# Patient Record
Sex: MALE | Race: WHITE | NOT HISPANIC OR LATINO | Employment: OTHER | URBAN - NONMETROPOLITAN AREA
[De-identification: names, ages, dates, MRNs, and addresses within clinical notes are randomized per-mention and may not be internally consistent; named-entity substitution may affect disease eponyms.]

---

## 2017-11-09 ENCOUNTER — OFFICE VISIT (OUTPATIENT)
Dept: FAMILY MEDICINE | Facility: OTHER | Age: 62
End: 2017-11-09
Attending: FAMILY MEDICINE
Payer: COMMERCIAL

## 2017-11-09 VITALS
TEMPERATURE: 98 F | OXYGEN SATURATION: 98 % | WEIGHT: 196 LBS | HEIGHT: 66 IN | DIASTOLIC BLOOD PRESSURE: 70 MMHG | SYSTOLIC BLOOD PRESSURE: 128 MMHG | HEART RATE: 75 BPM | BODY MASS INDEX: 31.5 KG/M2

## 2017-11-09 DIAGNOSIS — I10 ESSENTIAL HYPERTENSION: ICD-10-CM

## 2017-11-09 DIAGNOSIS — E11.9 CONTROLLED TYPE 2 DIABETES MELLITUS WITHOUT COMPLICATION, WITHOUT LONG-TERM CURRENT USE OF INSULIN (H): Primary | ICD-10-CM

## 2017-11-09 DIAGNOSIS — E78.00 PURE HYPERCHOLESTEROLEMIA: ICD-10-CM

## 2017-11-09 PROBLEM — I05.9 MITRAL VALVE DISORDER: Status: ACTIVE | Noted: 2017-11-09

## 2017-11-09 LAB
ALBUMIN SERPL-MCNC: 3.9 G/DL (ref 3.4–5)
ALP SERPL-CCNC: 55 U/L (ref 40–150)
ALT SERPL W P-5'-P-CCNC: 34 U/L (ref 0–70)
ANION GAP SERPL CALCULATED.3IONS-SCNC: 8 MMOL/L (ref 3–14)
AST SERPL W P-5'-P-CCNC: 22 U/L (ref 0–45)
BASOPHILS # BLD AUTO: 0 10E9/L (ref 0–0.2)
BASOPHILS NFR BLD AUTO: 0.4 %
BILIRUB SERPL-MCNC: 0.8 MG/DL (ref 0.2–1.3)
BUN SERPL-MCNC: 14 MG/DL (ref 7–30)
CALCIUM SERPL-MCNC: 9.2 MG/DL (ref 8.5–10.1)
CHLORIDE SERPL-SCNC: 101 MMOL/L (ref 94–109)
CHOLEST SERPL-MCNC: 179 MG/DL
CO2 SERPL-SCNC: 28 MMOL/L (ref 20–32)
CREAT SERPL-MCNC: 0.96 MG/DL (ref 0.66–1.25)
CREAT UR-MCNC: 157 MG/DL
DIFFERENTIAL METHOD BLD: NORMAL
EOSINOPHIL # BLD AUTO: 0.1 10E9/L (ref 0–0.7)
EOSINOPHIL NFR BLD AUTO: 0.9 %
ERYTHROCYTE [DISTWIDTH] IN BLOOD BY AUTOMATED COUNT: 12.3 % (ref 10–15)
EST. AVERAGE GLUCOSE BLD GHB EST-MCNC: 157 MG/DL
GFR SERPL CREATININE-BSD FRML MDRD: 79 ML/MIN/1.7M2
GLUCOSE SERPL-MCNC: 138 MG/DL (ref 70–99)
HBA1C MFR BLD: 7.1 % (ref 4.3–6)
HCT VFR BLD AUTO: 41.3 % (ref 40–53)
HDLC SERPL-MCNC: 53 MG/DL
HGB BLD-MCNC: 14.5 G/DL (ref 13.3–17.7)
IMM GRANULOCYTES # BLD: 0 10E9/L (ref 0–0.4)
IMM GRANULOCYTES NFR BLD: 0.2 %
LDLC SERPL CALC-MCNC: 85 MG/DL
LYMPHOCYTES # BLD AUTO: 1.4 10E9/L (ref 0.8–5.3)
LYMPHOCYTES NFR BLD AUTO: 16.7 %
MCH RBC QN AUTO: 32.1 PG (ref 26.5–33)
MCHC RBC AUTO-ENTMCNC: 35.1 G/DL (ref 31.5–36.5)
MCV RBC AUTO: 91 FL (ref 78–100)
MICROALBUMIN UR-MCNC: 15 MG/L
MICROALBUMIN/CREAT UR: 9.75 MG/G CR (ref 0–17)
MONOCYTES # BLD AUTO: 0.7 10E9/L (ref 0–1.3)
MONOCYTES NFR BLD AUTO: 8.9 %
NEUTROPHILS # BLD AUTO: 5.9 10E9/L (ref 1.6–8.3)
NEUTROPHILS NFR BLD AUTO: 72.9 %
NONHDLC SERPL-MCNC: 126 MG/DL
NRBC # BLD AUTO: 0 10*3/UL
NRBC BLD AUTO-RTO: 0 /100
PLATELET # BLD AUTO: 153 10E9/L (ref 150–450)
POTASSIUM SERPL-SCNC: 4.2 MMOL/L (ref 3.4–5.3)
PROT SERPL-MCNC: 7.3 G/DL (ref 6.8–8.8)
RBC # BLD AUTO: 4.52 10E12/L (ref 4.4–5.9)
SODIUM SERPL-SCNC: 137 MMOL/L (ref 133–144)
TRIGL SERPL-MCNC: 203 MG/DL
WBC # BLD AUTO: 8.1 10E9/L (ref 4–11)

## 2017-11-09 PROCEDURE — 99203 OFFICE O/P NEW LOW 30 MIN: CPT | Performed by: FAMILY MEDICINE

## 2017-11-09 PROCEDURE — 83036 HEMOGLOBIN GLYCOSYLATED A1C: CPT | Performed by: FAMILY MEDICINE

## 2017-11-09 PROCEDURE — 80053 COMPREHEN METABOLIC PANEL: CPT | Performed by: FAMILY MEDICINE

## 2017-11-09 PROCEDURE — 40000788 ZZHCL STATISTIC ESTIMATED AVERAGE GLUCOSE: Performed by: FAMILY MEDICINE

## 2017-11-09 PROCEDURE — 36415 COLL VENOUS BLD VENIPUNCTURE: CPT | Performed by: FAMILY MEDICINE

## 2017-11-09 PROCEDURE — 82043 UR ALBUMIN QUANTITATIVE: CPT | Performed by: FAMILY MEDICINE

## 2017-11-09 PROCEDURE — 85025 COMPLETE CBC W/AUTO DIFF WBC: CPT | Performed by: FAMILY MEDICINE

## 2017-11-09 PROCEDURE — 80061 LIPID PANEL: CPT | Performed by: FAMILY MEDICINE

## 2017-11-09 RX ORDER — SILDENAFIL 100 MG/1
100 TABLET, FILM COATED ORAL
COMMUNITY
Start: 2016-10-12 | End: 2021-06-01

## 2017-11-09 RX ORDER — LOVASTATIN 10 MG
20 TABLET ORAL AT BEDTIME
Qty: 90 TABLET | Refills: 3 | Status: SHIPPED | OUTPATIENT
Start: 2017-11-09 | End: 2019-09-12

## 2017-11-09 RX ORDER — LISINOPRIL 10 MG/1
10 TABLET ORAL DAILY
Qty: 90 TABLET | Refills: 3 | Status: SHIPPED | OUTPATIENT
Start: 2017-11-09 | End: 2018-12-02

## 2017-11-09 RX ORDER — GLUCOSAMINE HCL/CHONDROITIN SU 500-400 MG
CAPSULE ORAL
COMMUNITY
Start: 2013-05-06 | End: 2018-07-03

## 2017-11-09 ASSESSMENT — ANXIETY QUESTIONNAIRES
4. TROUBLE RELAXING: NOT AT ALL
7. FEELING AFRAID AS IF SOMETHING AWFUL MIGHT HAPPEN: NOT AT ALL
IF YOU CHECKED OFF ANY PROBLEMS ON THIS QUESTIONNAIRE, HOW DIFFICULT HAVE THESE PROBLEMS MADE IT FOR YOU TO DO YOUR WORK, TAKE CARE OF THINGS AT HOME, OR GET ALONG WITH OTHER PEOPLE: NOT DIFFICULT AT ALL
1. FEELING NERVOUS, ANXIOUS, OR ON EDGE: NOT AT ALL
GAD7 TOTAL SCORE: 0
6. BECOMING EASILY ANNOYED OR IRRITABLE: NOT AT ALL
2. NOT BEING ABLE TO STOP OR CONTROL WORRYING: NOT AT ALL
3. WORRYING TOO MUCH ABOUT DIFFERENT THINGS: NOT AT ALL
5. BEING SO RESTLESS THAT IT IS HARD TO SIT STILL: NOT AT ALL

## 2017-11-09 ASSESSMENT — PAIN SCALES - GENERAL: PAINLEVEL: NO PAIN (0)

## 2017-11-09 ASSESSMENT — PATIENT HEALTH QUESTIONNAIRE - PHQ9: SUM OF ALL RESPONSES TO PHQ QUESTIONS 1-9: 2

## 2017-11-09 NOTE — PROGRESS NOTES
[facility]    Dirk Terrell, 62 year old, male presents with   Chief Complaint   Patient presents with     Establish Care     Was over in [place] before-Here today to establish care.  Get a colonoscopy in 2016 had a polyp.  Was told to repeat in 5 years.  February of this year had an A1c of 7.2.  Needs his meds refilled today.  He declines a flu shot.  Denies any chest pain or shortness of breath.       PAST MEDICAL HISTORY:  Past Medical History:   Diagnosis Date     Controlled type 2 diabetes mellitus without complication, without long-term current use of insulin (H) 11/9/2017     Erectile dysfunction, unspecified erectile dysfunction type 10/12/2016     Essential hypertension 11/9/2017     Pure hypercholesterolemia 11/9/2017       PAST SURGICAL HISTORY:  Past Surgical History:   Procedure Laterality Date     COLONOSCOPY N/A 2016    one polyp was told to repeat in 5 years       MEDICATIONS:  Prior to Admission medications    Medication Sig Start Date End Date Taking? Authorizing Provider   SERTRALINE HCL PO Take 100 mg by mouth daily   Yes Reported, Patient   Glucose Blood (BLOOD GLUCOSE TEST STRIPS) STRP Check sugar 1-2 times daily 5/6/13  Yes Reported, Patient   blood glucose monitoring (SHAI MICROLET) lancets Check sugar 1-2 times daily 5/6/13  Yes Reported, Patient   sildenafil (VIAGRA) 100 MG tablet Take 100 mg by mouth 10/12/16  Yes Reported, Patient   lovastatin (MEVACOR) 10 MG tablet Take 2 tablets (20 mg) by mouth At Bedtime 11/9/17  Yes LANDY Garrett MD   lisinopril (PRINIVIL/ZESTRIL) 10 MG tablet Take 1 tablet (10 mg) by mouth daily 11/9/17  Yes LANDY Garrett MD   metFORMIN (GLUCOPHAGE) 1000 MG tablet 1000 mg twice a day 11/9/17  Yes LANDY Garrett MD       ALLERGIES:   No Known Allergies    ROS:  Constitutional, neuro, ENT, endocrine, pulmonary, cardiac, gastrointestinal, genitourinary, musculoskeletal, integument and psychiatric systems are negative, except as otherwise  "noted.    C: NEGATIVE for fever, chills, change in weight  I: NEGATIVE for worrisome rashes, moles or lesions  E: NEGATIVE for vision changes or irritation  E/M: NEGATIVE for ear, mouth and throat problems  R: NEGATIVE for significant cough or SOB  B: NEGATIVE for masses, tenderness or discharge  CV: NEGATIVE for chest pain, palpitations or peripheral edema  GI: NEGATIVE for nausea, abdominal pain, heartburn, or change in bowel habits  : NEGATIVE for frequency, dysuria, or hematuria  M: NEGATIVE for significant arthralgias or myalgia  N: NEGATIVE for weakness, dizziness or paresthesias  E: NEGATIVE for temperature intolerance, skin/hair changes  H: NEGATIVE for bleeding problems  P: NEGATIVE for changes in mood or affect    EXAM:/70 (BP Location: Left arm, Patient Position: Chair, Cuff Size: Adult Regular)  Pulse 75  Temp 98  F (36.7  C) (Tympanic)  Ht 5' 6\" (1.676 m)  Wt 196 lb (88.9 kg)  SpO2 98%  BMI 31.64 kg/m2 Body mass index is 31.64 kg/(m^2).   GENERAL APPEARANCE: healthy, alert and no distress  EYES: Eyes grossly normal to inspection, PERRL and conjunctivae and sclerae normal  HENT:and nose and mouth without ulcers or lesions  NECK: no adenopathy, no asymmetry, masses, or scars and thyroid normal to palpation  RESP: lungs clear to auscultation - no rales, rhonchi or wheezes  CV: regular rates and rhythm, normal S1 S2, no S3 or S4 and no murmur, click or rub  ABDOMEN: soft, nontender, without hepatosplenomegaly or masses and bowel sounds normal  MS: extremities normal- no gross deformities noted  SKIN: no suspicious lesions or rashes  DIABETIC FOOT EXAM: normal DP and PT pulses, no trophic changes or ulcerative lesions and normal sensory exam  PSYCH: mentation appears normal and affect normal/bright  Lab/ X-ray  Results for orders placed or performed in visit on 11/09/17 (from the past 24 hour(s))   CBC with platelets differential   Result Value Ref Range    WBC 8.1 4.0 - 11.0 10e9/L    RBC " Count 4.52 4.4 - 5.9 10e12/L    Hemoglobin 14.5 13.3 - 17.7 g/dL    Hematocrit 41.3 40.0 - 53.0 %    MCV 91 78 - 100 fl    MCH 32.1 26.5 - 33.0 pg    MCHC 35.1 31.5 - 36.5 g/dL    RDW 12.3 10.0 - 15.0 %    Platelet Count 153 150 - 450 10e9/L    Diff Method Automated Method     % Neutrophils 72.9 %    % Lymphocytes 16.7 %    % Monocytes 8.9 %    % Eosinophils 0.9 %    % Basophils 0.4 %    % Immature Granulocytes 0.2 %    Nucleated RBCs 0 0 /100    Absolute Neutrophil 5.9 1.6 - 8.3 10e9/L    Absolute Lymphocytes 1.4 0.8 - 5.3 10e9/L    Absolute Monocytes 0.7 0.0 - 1.3 10e9/L    Absolute Eosinophils 0.1 0.0 - 0.7 10e9/L    Absolute Basophils 0.0 0.0 - 0.2 10e9/L    Abs Immature Granulocytes 0.0 0 - 0.4 10e9/L    Absolute Nucleated RBC 0.0        ASSESSMENT/PLAN:    ICD-10-CM    1. Controlled type 2 diabetes mellitus without complication, without long-term current use of insulin (H) E11.9 CBC with platelets differential. Will call him with labs.  Is on a statin.  Is on an ACE inhibitor.  Is taking aspirin.  Does not smoke.  We'll work with diet and exercise.  Will call him with results. He also doesn't feel he needs a sertraline so will drop it down from 100 mg daily to 50 mg daily for a week and then 25 mg daily for a week and then discontinue.     Hemoglobin A1c     Albumin Random Urine Quantitative with Creat Ratio     Comprehensive metabolic panel     metFORMIN (GLUCOPHAGE) 1000 MG tablet   2. Pure hypercholesterolemia E78.00 Lipid Profile     lovastatin (MEVACOR) 10 MG tablet   3. Essential hypertension I10 lisinopril (PRINIVIL/ZESTRIL) 10 MG tablet         JOSEPH Garrett MD  November 9, 2017

## 2017-11-09 NOTE — NURSING NOTE
"Chief Complaint   Patient presents with     Establish Care     Was over in [place] before-Here today to establish care.         Initial /70 (BP Location: Left arm, Patient Position: Chair, Cuff Size: Adult Regular)  Pulse 75  Temp 98  F (36.7  C) (Tympanic)  Ht 5' 6\" (1.676 m)  Wt 196 lb (88.9 kg)  SpO2 98%  BMI 31.64 kg/m2 Estimated body mass index is 31.64 kg/(m^2) as calculated from the following:    Height as of this encounter: 5' 6\" (1.676 m).    Weight as of this encounter: 196 lb (88.9 kg).  Medication Reconciliation: complete   GALEN DWYER      "

## 2017-11-09 NOTE — MR AVS SNAPSHOT
"              After Visit Summary   11/9/2017    Dirk Terrell    MRN: [id number]           Patient Information     Date Of Birth          1955        Visit Information        Provider Department      11/9/2017 10:45 AM LANDY Garrett MD [facility]        Today's Diagnoses     Controlled type 2 diabetes mellitus without complication, without long-term current use of insulin (H)    -  1    Pure hypercholesterolemia        Essential hypertension          Care Instructions    We will call with the labs.wean off the sertraline.            Follow-ups after your visit        Who to contact     If you have questions or need follow up information about today's clinic visit or your schedule please contact [facility] directly at [phone].  Normal or non-critical lab and imaging results will be communicated to you by MyChart, letter or phone within 4 business days after the clinic has received the results. If you do not hear from us within 7 days, please contact the clinic through MyChart or phone. If you have a critical or abnormal lab result, we will notify you by phone as soon as possible.  Submit refill requests through [employer] or call your pharmacy and they will forward the refill request to us. Please allow 3 business days for your refill to be completed.          Additional Information About Your Visit        MyChart Information     [employer] lets you send messages to your doctor, view your test results, renew your prescriptions, schedule appointments and more. To sign up, go to [email or web address] . Click on \"Log in\" on the left side of the screen, which will take you to the Welcome page. Then click on \"Sign up Now\" on the right side of the page.     You will be asked to enter the access code listed below, as well as some personal information. Please follow the directions to create your username and password.     Your access code is: [email or web address]  Expires: 2/7/2018 11:08 AM   " "  Your access code will  in 90 days. If you need help or a new code, please call your [facility] clinic or [phone].        Care EveryWhere ID     This is your Care EveryWhere ID. This could be used by other organizations to access your [facility] medical records  [id number]        Your Vitals Were     Pulse Temperature Height Pulse Oximetry BMI (Body Mass Index)       75 98  F (36.7  C) (Tympanic) 5' 6\" (1.676 m) 98% 31.64 kg/m2        Blood Pressure from Last 3 Encounters:   17 128/70    Weight from Last 3 Encounters:   17 196 lb (88.9 kg)              We Performed the Following     Albumin Random Urine Quantitative with Creat Ratio     CBC with platelets differential     Comprehensive metabolic panel     Hemoglobin A1c     Lipid Profile          Today's Medication Changes          These changes are accurate as of: 17 11:08 AM.  If you have any questions, ask your nurse or doctor.               These medicines have changed or have updated prescriptions.        Dose/Directions    lisinopril 10 MG tablet   Commonly known as:  PRINIVIL/ZESTRIL   This may have changed:    - medication strength  - when to take this   Used for:  Essential hypertension        Dose:  10 mg   Take 1 tablet (10 mg) by mouth daily   Quantity:  90 tablet   Refills:  3       lovastatin 10 MG tablet   Commonly known as:  MEVACOR   This may have changed:  when to take this   Used for:  Pure hypercholesterolemia        Dose:  20 mg   Take 2 tablets (20 mg) by mouth At Bedtime   Quantity:  90 tablet   Refills:  3       metFORMIN 1000 MG tablet   Commonly known as:  GLUCOPHAGE   This may have changed:    - medication strength  - how much to take  - how to take this  - when to take this  - additional instructions   Used for:  Controlled type 2 diabetes mellitus without complication, without long-term current use of insulin (H)        1000 mg twice a day   Quantity:  180 tablet   Refills:  3            Where to get your " medicines      These medications were sent to [id number]  [place], [place] - [address]  [address], [place] [address]     Phone:  [phone]     lisinopril 10 MG tablet    lovastatin 10 MG tablet    metFORMIN 1000 MG tablet                Primary Care Provider Office Phone # Fax #    Cristi Garcia [phone]       [place] [address] [address] [facility] [address]        Equal Access to Services     [facility] : Hadii aad ku hadasho Soomaali, waaxda luqadaha, qaybta kaalmada adeegyada, waxay idiin hayaan adeeg kharash lamary . So [facility] [phone].    ATENCIÓN: Si habla español, tiene a haskins disposición servicios gratuitos de asistencia lingüística. [facility] [phone].    We comply with applicable federal civil rights laws and [place] laws. We do not discriminate on the basis of race, color, national origin, age, disability, sex, sexual orientation, or gender identity.            Thank you!     Thank you for choosing [facility]  for your care. Our goal is always to provide you with excellent care. Hearing back from our patients is one way we can continue to improve our services. Please take a few minutes to complete the written survey that you may receive in the mail after your visit with us. Thank you!             Your Updated Medication List - Protect others around you: Learn how to safely use, store and throw away your medicines at www.disposemymeds.org.          This list is accurate as of: 11/9/17 11:08 AM.  Always use your most recent med list.                   Brand Name Dispense Instructions for use Diagnosis    blood glucose monitoring lancets      Check sugar 1-2 times daily        BLOOD GLUCOSE TEST STRIPS Strp      Check sugar 1-2 times daily        lisinopril 10 MG tablet    PRINIVIL/ZESTRIL    90 tablet    Take 1 tablet (10 mg) by mouth daily    Essential hypertension       lovastatin 10 MG tablet    MEVACOR    90 tablet    Take 2 tablets  (20 mg) by mouth At Bedtime    Pure hypercholesterolemia       metFORMIN 1000 MG tablet    GLUCOPHAGE    180 tablet    1000 mg twice a day    Controlled type 2 diabetes mellitus without complication, without long-term current use of insulin (H)       SERTRALINE HCL PO      Take 100 mg by mouth daily        sildenafil 100 MG tablet    VIAGRA     Take 100 mg by mouth

## 2017-11-10 ASSESSMENT — ANXIETY QUESTIONNAIRES: GAD7 TOTAL SCORE: 0

## 2018-06-26 NOTE — PROGRESS NOTES
SUBJECTIVE:   Dirk Terrell is a 62 year old male who presents to clinic today for the following health issues:      Diabetes Follow-up    Patient is checking blood sugars: once daily.  Results are as follows:         Just does it randomly throughout the day. Has been running good. Does not check after eating    Diabetic concerns: None     Symptoms of hypoglycemia (low blood sugar): none     Paresthesias (numbness or burning in feet) or sores: No     Date of last diabetic eye exam: believes he has had a diabetic eye exam but its been over 2 years    Diabetes Management Resources    Hyperlipidemia Follow-Up      Rate your low fat/cholesterol diet?: not monitoring fat    Taking statin?  Yes, no muscle aches from statin    Other lipid medications/supplements?:  none    Hypertension Follow-up      Outpatient blood pressures are not being checked.    Low Salt Diet: low salt    BP Readings from Last 2 Encounters:   11/09/17 128/70     Hemoglobin A1C (%)   Date Value   11/09/2017 7.1 (H)     LDL Cholesterol Calculated (mg/dL)   Date Value   11/09/2017 85       Amount of exercise or physical activity: 4-5 days/week for an average of 45-60 minutes    Problems taking medications regularly: No    Medication side effects: none    Diet: regular (no restrictions)        Sleep Problem       Duration: many years    Description (location/character/radiation):  none    Intensity:  severe    Accompanying signs and symptoms: wakes up many times throughout the night. Stops breathing many times.     History (similar episodes/previous evaluation): has had for years, no sleep studdy ever done    Precipitating or alleviating factors: None    Therapies tried and outcome: None     Musculoskeletal problem/pain      Duration: about a month    Description  Location: right wrist    Intensity:  moderate    Accompanying signs and symptoms: only hurts when he moves it.     History  Previous similar problem: no   Previous evaluation:   none    Precipitating or alleviating factors:  Trauma or overuse: no   Aggravating factors include: hurts to move it.    Therapies tried and outcome: ice and limits the use     Depression and Anxiety Follow-Up    Status since last visit: Worsened not taking zoloft any more. Would like to go back on it.    Other associated symptoms:irritable    Complicating factors:     Significant life event: No     Current substance abuse: alcohol use has gone up    PHQ-9 11/9/2017   Total Score 2   Q9: Suicide Ideation Not at all     MARTY-7 SCORE 11/9/2017   Total Score 0       PHQ-9  English  PHQ-9   Any Language  MARTY-7  Suicide Assessment Five-step Evaluation and Treatment (SAFE-T)    Amount of exercise or physical activity: 4-5 days/week for an average of 45-60 minutes    Problems taking medications regularly: No    Medication side effects: none    Diet: regular (no restrictions)       [facility]    Dirk Terrell, 62 year old, male presents with   Chief Complaint   Patient presents with     Hypertension     Diabetes     Lipids     Sleep Problem has daytime sleepiness.     Musculoskeletal Problem here to his right extensor tendon of his right thumb while fishing has some discomfort when it stretch but at this point is not interested in seeing Orth O.     Depression is felt anxiety when off the Zoloft would like to get back on it.       PAST MEDICAL HISTORY:  Past Medical History:   Diagnosis Date     Controlled type 2 diabetes mellitus without complication, without long-term current use of insulin (H) 11/9/2017     Erectile dysfunction, unspecified erectile dysfunction type 10/12/2016     Essential hypertension 11/9/2017     Pure hypercholesterolemia 11/9/2017       PAST SURGICAL HISTORY:  Past Surgical History:   Procedure Laterality Date     COLONOSCOPY N/A 2016    one polyp was told to repeat in 5 years       MEDICATIONS:  Prior to Admission medications    Medication Sig Start Date End Date Taking?  Authorizing Provider   blood glucose monitoring ([employer] MICROLET) lancets Use to test blood sugar one time daily or as directed. 7/3/18  Yes LANDY Garrett MD   Glucose Blood (BLOOD GLUCOSE TEST STRIPS) STRP Test once daily 7/3/18  Yes LANDY Garrett MD   lisinopril (PRINIVIL/ZESTRIL) 10 MG tablet Take 1 tablet (10 mg) by mouth daily 11/9/17  Yes LANDY Garrett MD   lovastatin (MEVACOR) 10 MG tablet Take 2 tablets (20 mg) by mouth At Bedtime 11/9/17  Yes LANDY Garrett MD   metFORMIN (GLUCOPHAGE) 1000 MG tablet 1000 mg twice a day 11/9/17  Yes LANDY Garrett MD   sertraline (ZOLOFT) 50 MG tablet Take 2 tablets (100 mg) by mouth daily 7/3/18  Yes LANDY Garrett MD   sildenafil (VIAGRA) 100 MG tablet Take 100 mg by mouth 10/12/16  Yes Reported, Patient       ALLERGIES:   No Known Allergies    ROS:  Constitutional, neuro, ENT, endocrine, pulmonary, cardiac, gastrointestinal, genitourinary, musculoskeletal, integument and psychiatric systems are negative, except as otherwise noted.      EXAM:  /80  Pulse 70  Temp 97.6  F (36.4  C) (Tympanic)  Wt 202 lb (91.6 kg)  SpO2 98%  BMI 32.6 kg/m2 Body mass index is 32.6 kg/(m^2).   GENERAL APPEARANCE: healthy, alert and no distress  EYES: Eyes grossly normal to inspection, PERRL and conjunctivae and sclerae normal  HENT:  nose and mouth without ulcers or lesions  NECK: no adenopathy, no asymmetry, masses, or scars and thyroid normal to palpation  RESP: lungs clear to auscultation - no rales, rhonchi or wheezes  CV: regular rates and rhythm, normal S1 S2, no S3 or S4 and no murmur, click or rub  ABDOMEN: soft, nontender, without hepatosplenomegaly or masses and bowel sounds normal  FEET: Good pulses no edema no sores normal monofilament  NEURO: Normal strength and tone, mentation intact and speech normal  PSYCH: mentation appears normal and affect normal  Lab/ X-ray  No results found for this or any previous visit (from the past 24 hour(s)).  MARTY-7 SCORE 11/9/2017  7/3/2018   Total Score 0 12     PHQ-9 SCORE 11/9/2017 7/3/2018   Total Score 2 15       ASSESSMENT/PLAN:    ICD-10-CM    1. Controlled type 2 diabetes mellitus without complication, without long-term current use of insulin (H) E11.9 Basic metabolic panel     Hemoglobin A1c     TSH with free T4 reflex     blood glucose monitoring (SHAI MICROLET) lancets     Glucose Blood (BLOOD GLUCOSE TEST STRIPS) STRP   2. Essential hypertension I10 Basic metabolic panel   3. Pure hypercholesterolemia E78.00 AST     Lipid Profile (Chol, Trig, HDL, LDL calc)   4. Need for hepatitis C screening test Z11.59 Hepatitis C antibody   5. Anxiety F41.9 sertraline (ZOLOFT) 50 MG tablet   6. Daytime sleepiness R40.0 SLEEP EVALUATION & MANAGEMENT REFERRAL - [facility] [place] [phone] (Age 5 and up)   Diabetes check a BMP A1c TSH refilled his lancets and test strips.  For hypertension check a BMP hypercholesterolemia check AST lipid.  Check hepatitis C screening.  For the anxiety he went off the Zoloft and did not like how he felt will restart it.  He also has had daytime sleepiness and snoring will set up a sleep study.  I will see him in 3 months sooner if there are problems.      JOSEPH Garrett MD  July 3, 2018

## 2018-07-03 ENCOUNTER — OFFICE VISIT (OUTPATIENT)
Dept: FAMILY MEDICINE | Facility: OTHER | Age: 63
End: 2018-07-03
Attending: FAMILY MEDICINE
Payer: COMMERCIAL

## 2018-07-03 VITALS
SYSTOLIC BLOOD PRESSURE: 132 MMHG | DIASTOLIC BLOOD PRESSURE: 80 MMHG | BODY MASS INDEX: 32.6 KG/M2 | TEMPERATURE: 97.6 F | WEIGHT: 202 LBS | OXYGEN SATURATION: 98 % | HEART RATE: 70 BPM

## 2018-07-03 DIAGNOSIS — R40.0 DAYTIME SLEEPINESS: ICD-10-CM

## 2018-07-03 DIAGNOSIS — F41.9 ANXIETY: ICD-10-CM

## 2018-07-03 DIAGNOSIS — E78.00 PURE HYPERCHOLESTEROLEMIA: ICD-10-CM

## 2018-07-03 DIAGNOSIS — Z11.59 NEED FOR HEPATITIS C SCREENING TEST: ICD-10-CM

## 2018-07-03 DIAGNOSIS — E11.9 CONTROLLED TYPE 2 DIABETES MELLITUS WITHOUT COMPLICATION, WITHOUT LONG-TERM CURRENT USE OF INSULIN (H): Primary | ICD-10-CM

## 2018-07-03 DIAGNOSIS — I10 ESSENTIAL HYPERTENSION: ICD-10-CM

## 2018-07-03 LAB
ANION GAP SERPL CALCULATED.3IONS-SCNC: 10 MMOL/L (ref 3–14)
AST SERPL W P-5'-P-CCNC: 33 U/L (ref 0–45)
BUN SERPL-MCNC: 20 MG/DL (ref 7–30)
CALCIUM SERPL-MCNC: 9.4 MG/DL (ref 8.5–10.1)
CHLORIDE SERPL-SCNC: 101 MMOL/L (ref 94–109)
CHOLEST SERPL-MCNC: 196 MG/DL
CO2 SERPL-SCNC: 28 MMOL/L (ref 20–32)
CREAT SERPL-MCNC: 0.86 MG/DL (ref 0.66–1.25)
EST. AVERAGE GLUCOSE BLD GHB EST-MCNC: 160 MG/DL
GFR SERPL CREATININE-BSD FRML MDRD: >90 ML/MIN/1.7M2
GLUCOSE SERPL-MCNC: 178 MG/DL (ref 70–99)
HBA1C MFR BLD: 7.2 % (ref 0–5.6)
HDLC SERPL-MCNC: 38 MG/DL
LDLC SERPL CALC-MCNC: ABNORMAL MG/DL
NONHDLC SERPL-MCNC: 158 MG/DL
POTASSIUM SERPL-SCNC: 4.5 MMOL/L (ref 3.4–5.3)
SODIUM SERPL-SCNC: 139 MMOL/L (ref 133–144)
TRIGL SERPL-MCNC: 479 MG/DL
TSH SERPL DL<=0.005 MIU/L-ACNC: 1.88 MU/L (ref 0.4–4)

## 2018-07-03 PROCEDURE — 83036 HEMOGLOBIN GLYCOSYLATED A1C: CPT | Mod: ZL | Performed by: FAMILY MEDICINE

## 2018-07-03 PROCEDURE — 80048 BASIC METABOLIC PNL TOTAL CA: CPT | Mod: ZL | Performed by: FAMILY MEDICINE

## 2018-07-03 PROCEDURE — 86803 HEPATITIS C AB TEST: CPT | Mod: ZL | Performed by: FAMILY MEDICINE

## 2018-07-03 PROCEDURE — G0463 HOSPITAL OUTPT CLINIC VISIT: HCPCS

## 2018-07-03 PROCEDURE — 80061 LIPID PANEL: CPT | Mod: ZL | Performed by: FAMILY MEDICINE

## 2018-07-03 PROCEDURE — 84443 ASSAY THYROID STIM HORMONE: CPT | Mod: ZL | Performed by: FAMILY MEDICINE

## 2018-07-03 PROCEDURE — 99214 OFFICE O/P EST MOD 30 MIN: CPT | Performed by: FAMILY MEDICINE

## 2018-07-03 PROCEDURE — 40000788 ZZHCL STATISTIC ESTIMATED AVERAGE GLUCOSE: Mod: ZL | Performed by: FAMILY MEDICINE

## 2018-07-03 PROCEDURE — 36415 COLL VENOUS BLD VENIPUNCTURE: CPT | Mod: ZL | Performed by: FAMILY MEDICINE

## 2018-07-03 PROCEDURE — 99000 SPECIMEN HANDLING OFFICE-LAB: CPT | Performed by: FAMILY MEDICINE

## 2018-07-03 PROCEDURE — 84450 TRANSFERASE (AST) (SGOT): CPT | Mod: ZL | Performed by: FAMILY MEDICINE

## 2018-07-03 RX ORDER — GLUCOSAMINE HCL/CHONDROITIN SU 500-400 MG
CAPSULE ORAL
Qty: 100 EACH | Refills: 3 | Status: SHIPPED | OUTPATIENT
Start: 2018-07-03 | End: 2022-06-07

## 2018-07-03 ASSESSMENT — ANXIETY QUESTIONNAIRES
1. FEELING NERVOUS, ANXIOUS, OR ON EDGE: MORE THAN HALF THE DAYS
3. WORRYING TOO MUCH ABOUT DIFFERENT THINGS: MORE THAN HALF THE DAYS
2. NOT BEING ABLE TO STOP OR CONTROL WORRYING: SEVERAL DAYS
GAD7 TOTAL SCORE: 12
6. BECOMING EASILY ANNOYED OR IRRITABLE: NEARLY EVERY DAY
7. FEELING AFRAID AS IF SOMETHING AWFUL MIGHT HAPPEN: SEVERAL DAYS
4. TROUBLE RELAXING: MORE THAN HALF THE DAYS
5. BEING SO RESTLESS THAT IT IS HARD TO SIT STILL: SEVERAL DAYS

## 2018-07-03 ASSESSMENT — PAIN SCALES - GENERAL: PAINLEVEL: MODERATE PAIN (5)

## 2018-07-03 NOTE — MR AVS SNAPSHOT
After Visit Summary   7/3/2018    Dirk Terrell    MRN: [id number]           Patient Information     Date Of Birth          1955        Visit Information        Provider Department      7/3/2018 9:15 AM LANDY Garrett MD [facility]        Today's Diagnoses     Controlled type 2 diabetes mellitus without complication, without long-term current use of insulin (H)    -  1    Essential hypertension        Pure hypercholesterolemia        Need for hepatitis C screening test        Anxiety        Daytime sleepiness           Follow-ups after your visit        Additional Services     SLEEP [facility]AL[facility]NAG[facility] RE[facility]ERR[facility] - [facility] [phone] (Age 5 and up)       Please be aware that coverage of these services is subject to the terms and limitations of your health insurance plan.  Call member services at your health plan with any benefit or coverage questions.      Please bring the following to your appointment:    >>   List of current medications   >>   This referral request   >>   Any documents/labs given to you for this referral                      Follow-up notes from your care team     Return in about 3 months (around 10/3/2018).      Your next 10 appointments already scheduled     Sep 27, 2018  9:15 AM CDT   (Arrive by 9:00 AM)   SHORT with LANDY Garrett MD   [facility] ([facility] )    [address]  [place] [address]   [phone]              Future tests that were ordered for you today     Open Future Orders        Priority Expected Expires Ordered    SLEEP EVALUATION & MANAGEMENT REFERRAL - [facility] [phone] (Age 5 and up) Routine 7/3/2018 7/3/2019 7/3/2018            Who to contact     If you have questions or need follow up information about today's clinic visit or your schedule please contact [facility] directly at [phone].  Normal or non-critical  lab and imaging results will be communicated to you by [employer], letter or phone within 4 business days after the clinic has received the results. If you do not hear from us within 7 days, please contact the clinic through [employer] or phone. If you have a critical or abnormal lab result, we will notify you by phone as soon as possible.  Submit refill requests through [employer] or call your pharmacy and they will forward the refill request to us. Please allow 3 business days for your refill to be completed.          Additional Information About Your Visit        [employer] Information     [employer] gives you secure access to your electronic health record. If you see a primary care provider, you can also send messages to your care team and make appointments. If you have questions, please call your primary care clinic.  If you do not have a primary care provider, please call [phone] and they will assist you.        Care EveryWhere ID     This is your Care EveryWhere ID. This could be used by other organizations to access your [facility] medical records  [id number]        Your Vitals Were     Pulse Temperature Pulse Oximetry BMI (Body Mass Index)          70 97.6  F (36.4  C) (Tympanic) 98% 32.6 kg/m2         Blood Pressure from Last 3 Encounters:   07/03/18 132/80   11/09/17 128/70    Weight from Last 3 Encounters:   07/03/18 202 lb (91.6 kg)   11/09/17 196 lb (88.9 kg)              We Performed the Following     AST     Basic metabolic panel     Hemoglobin A1c     Hepatitis C antibody     Lipid Profile (Chol, Trig, HDL, LDL calc)     TSH with free T4 reflex          Today's Medication Changes          These changes are accurate as of 7/3/18  9:34 AM.  If you have any questions, ask your nurse or doctor.               These medicines have changed or have updated prescriptions.        Dose/Directions    blood glucose monitoring lancets   This may have changed:  See the new instructions.   Used for:  Controlled type 2  diabetes mellitus without complication, without long-term current use of insulin (H)   Changed by:  LANDY Garrett MD        Use to test blood sugar one time daily or as directed.   Quantity:  100 each   Refills:  3       BLOOD GLUCOSE TEST STRIPS Strp   This may have changed:  See the new instructions.   Used for:  Controlled type 2 diabetes mellitus without complication, without long-term current use of insulin (H)   Changed by:  LANDY Garrett MD        Test once daily   Quantity:  100 each   Refills:  3       sertraline 50 MG tablet   Commonly known as:  ZOLOFT   This may have changed:  medication strength   Used for:  Anxiety   Changed by:  LANDY Garrett MD        Dose:  100 mg   Take 2 tablets (100 mg) by mouth daily   Quantity:  60 tablet   Refills:  5            Where to get your medicines      These medications [id number] [place] [facility] [address]   [address], [facility] [address]     Phone:  [phone]     blood glucose monitoring lancets    BLOOD GLUCOSE TEST STRIPS Strp    sertraline 50 MG tablet                Primary Care Provider Office Phone # Fax #    rCisti Garcia [phone]       [address] [address]        Equal Access to Services     [place] [facility] AH: Hadii aad ku hadasho Soomaali, waaxda luqadaha, qaybta kaalmada adeegyada, waxay eduardoin haylorenan adetracey harris. So [facility] [phone].    ATENCIÓN: Si habla español, tiene a haskins disposición servicios gratuitos de asistencia lingüística. Llame al [phone].    We comply with applicable federal civil rights laws and [place] laws. We do not discriminate on the basis of race, color, national origin, age, disability, sex, sexual orientation, or gender identity.            Thank you!     Thank you for choosing [facility]  for your care. Our goal is always to provide you with excellent care. Hearing back from our patients is one way we can  continue to improve our services. Please take a few minutes to complete the written survey that you may receive in the mail after your visit with us. Thank you!             Your Updated Medication List - Protect others around you: Learn how to safely use, store and throw away your medicines at www.disposemymeds.org.          This list is accurate as of 7/3/18  9:34 AM.  Always use your most recent med list.                   Brand Name Dispense Instructions for use Diagnosis    blood glucose monitoring lancets     100 each    Use to test blood sugar one time daily or as directed.    Controlled type 2 diabetes mellitus without complication, without long-term current use of insulin (H)       BLOOD GLUCOSE TEST STRIPS Strp     100 each    Test once daily    Controlled type 2 diabetes mellitus without complication, without long-term current use of insulin (H)       lisinopril 10 MG tablet    PRINIVIL/ZESTRIL    90 tablet    Take 1 tablet (10 mg) by mouth daily    Essential hypertension       lovastatin 10 MG tablet    MEVACOR    90 tablet    Take 2 tablets (20 mg) by mouth At Bedtime    Pure hypercholesterolemia       metFORMIN 1000 MG tablet    GLUCOPHAGE    180 tablet    1000 mg twice a day    Controlled type 2 diabetes mellitus without complication, without long-term current use of insulin (H)       sertraline 50 MG tablet    ZOLOFT    60 tablet    Take 2 tablets (100 mg) by mouth daily    Anxiety       sildenafil 100 MG tablet    VIAGRA     Take 100 mg by mouth

## 2018-07-03 NOTE — NURSING NOTE
"Chief Complaint   Patient presents with     Hypertension     Diabetes     Lipids     Sleep Problem     Musculoskeletal Problem     Depression       Initial /80  Pulse 70  Temp 97.6  F (36.4  C) (Tympanic)  Wt 202 lb (91.6 kg)  SpO2 98%  BMI 32.6 kg/m2 Estimated body mass index is 32.6 kg/(m^2) as calculated from the following:    Height as of 11/9/17: 5' 6\" (1.676 m).    Weight as of this encounter: 202 lb (91.6 kg).  Medication Reconciliation: complete    Jessica Forbes MA    "

## 2018-07-04 ASSESSMENT — ANXIETY QUESTIONNAIRES: GAD7 TOTAL SCORE: 12

## 2018-07-04 ASSESSMENT — PATIENT HEALTH QUESTIONNAIRE - PHQ9: SUM OF ALL RESPONSES TO PHQ QUESTIONS 1-9: 15

## 2018-07-05 ENCOUNTER — TELEPHONE (OUTPATIENT)
Dept: FAMILY MEDICINE | Facility: OTHER | Age: 63
End: 2018-07-05

## 2018-07-05 DIAGNOSIS — E78.5 ELEVATED LIPIDS: Primary | ICD-10-CM

## 2018-07-05 DIAGNOSIS — R73.09 ELEVATED HEMOGLOBIN A1C: ICD-10-CM

## 2018-07-05 DIAGNOSIS — R73.09 ELEVATED GLUCOSE: ICD-10-CM

## 2018-07-05 LAB — HCV AB SERPL QL IA: NONREACTIVE

## 2018-07-11 ENCOUNTER — DOCUMENTATION ONLY (OUTPATIENT)
Dept: SLEEP MEDICINE | Facility: HOSPITAL | Age: 63
End: 2018-07-11

## 2018-07-11 DIAGNOSIS — E11.8 TYPE 2 DIABETES MELLITUS WITH COMPLICATION, UNSPECIFIED LONG TERM INSULIN USE STATUS: ICD-10-CM

## 2018-07-11 DIAGNOSIS — I10 ESSENTIAL HYPERTENSION: ICD-10-CM

## 2018-07-11 DIAGNOSIS — R06.81 APNEA: ICD-10-CM

## 2018-07-11 DIAGNOSIS — R06.83 SNORING: Primary | ICD-10-CM

## 2018-07-11 NOTE — PROGRESS NOTES
"SLEEP HISTORY QUESTIONNAIRE    Please describe the main reason for your sleep appointment? Witnessed apnea, snore    How long has this been a problem? Years    Have you been diagnosed with a sleep problem in the past? {No\"}    If so, what?     What treatment was recommended?     Have you had a sleep study in the past? {No}    If yes, where and when?     Alcohol and substance:     On two or more nights per week, do you drink alcohol to help you fall asleep?{YES}    On two or more nights per week, do you take melatonin to help you fall asleep? {NO}    On two or more nights per week, do you take over the counter medicine to fall asleep?  {No\"}    Caffeine History:     Do you take drinks with caffeine (coffee, tea, soda, energy drinks)? {YES}    Do you have 3 or more caffeine drinks in a day? {YES}    Do you have caffeine drinks within 6 hours of bedtime? {No\"}    Do you smoke or use tobacco? {No}    Do you exercise? {YES\"}    EPWORTH Sleepiness scale: The following questions ask how likely you are to fall asleep or doze off, when you should be awake. Do not include times when you are just feeling tired. Think about how you've felt in recent days. If you have not done some of these things recently, try to recall what happened.     Use the following numbers to show your answer:   0 = no change of dozing   1= slight chance of dozing   2 = moderate chance of dozing   3 = high chance of dozing     Situation  Chance of dozing   Sitting and reading  1   Watching TV 2   Sitting in a public place (for example, at a theater or in a meeting) 0   Riding in a car for an hour without a break (as a passenger) 1   Lying down to rest in the afternoon 0   Sitting and talking to someone 0   Sitting quietly after a lunch without alcohol 1   In a car, stopping for a few minutes in traffic 0     Sleep Hygiene:   Using a 24 Hour Clock    What time do you usually get into bed on workdays?NA    Weekend/non work days? 10    What time do you get " "out of bed on workdays? NA     Weekend/non work days?6    Do you work the evening or night shift or do your shifts rotate? {NO}    How long does it usually take to fall to sleep? 15 min    How many times do you wake during the night? 5 +    How much time do you feel that you are awake during the entire night? 1 hour    How long does it take for you to fall back to sleep after you wake up? 10 min    Why do you think you wake up? Un sure    What do you do when you wake up? Lay there till sleep    How much sleep do you think you get on work nights? NA    How much sleep do you think you get on weekends/non work days? 6 hours    How much sleep do you think you need to feel your best? 7 hours    How many days during a week do you take a nap on average? 1    What is the average length of your naps? 30 min    Do you feel better after taking a nap? {Not usually}    If you could chose the best sleep schedule for you, what time would you go to bed? 10  What time would you get up? 6    Do you read in bed? {NO}    Do you eat in bed? {NO\"}    Do you watch TV in bed? {YES not often}    Do you do work in bed? {No}    Do you use a computer or phone in bed? {NO\"}    Sleep Disruptions?   Leg movements:  Do you ever have restless, crawling, aching or other unusual feelings in your legs? {NO}    Do you ever wake yourself by kicking your legs during the night? NO}    Are the sheets and blankets messed up or tossed about when you get up? {Occationally\"}    Night-time behaviors:   Do you have nightmares or night terrors? {NO}   How often?     Have you had times when you were sleep walking? {NO\"}    Have you been seen doing anything unusual while you sleep at nights? {NO\"}  What?   How often?    Have you ever hurt yourself or someone else while you were sleeping? {NO}  Please describe:     Do you clench or grind your teeth during the night? YES    Sleep Apnea (pauses in breathing during sleep):  Do you wake with a headache in the morning? " "{NO\"}  How often?     Does your bed partner, family or friends ever say that you snore? {YES\"}  How many nights per week do you snore? all  Can snoring be heard outside the bedroom? YES    Do you ever wake yourself up from snoring, gasping or choking? {YES\"}    Have you ever been told that you stop breathing or have pauses in your breathing? {YES\"}    Do you wake in the morning with a dry throat or mouth? {YES    Do you have trouble breathing through your nose? {YES at times    Do you have problems with heartburn, reflux or a hiatal hernia? {NO}    Which positions do you usually sleep in? (stomach, back, sides, all) side    Do you use oxygen or any other medical equipment when you sleep? {NO}    Do members of your family (related by blood) snore? {YES}    Have any members of your family been diagnosed with with sleep apnea? {YES}    Do other members of your family have restless leg? {NO\"}    Do other members of your family have sleep walking? {No\"}    Have you ever had an accident, or near accident due to sleepiness while driving? {NO\"}    Does your sleepiness affect your work on the job or at school? {NA\"}    Do you ever fall asleep by accident while doing a task? {No\"}    Have you had sudden muscle weakness when laughing, angry or surprised? {NO}    Have you ever been unable to move your body when falling asleep or waking up? {NO \"}    Do you ever have trouble  your dreams from real life events? NO}  Please describe:     Social History:   Marital status: single    Who lives in your home with you? Dog    Mother (alive or dead)?  If has , from what? COPD  Father (alive or dead)?   If has , from what? Heart    Siblings: {Yes 2}  Have any ? {No\"}  If so, from what?     Currently working?  NO}  If yes, work:   Former jobs: [employer]    HRQL:     Would you say your general health is (1 best to 5 worst): 2    Physical Health (includes illness and injury) During the past 30 days, on how " "many days was your physical health not good? 0 of 30 days     Mental Health (includes stress, depression, and problems with emotion):  During the past 30 days, on how many days was your mental health not good? 0 of 30 days.     Insomnia Outcome:   During the past 30 days, on how many days did poor physical or mental health keep you from doing usual activities? This might be self care, work or play? 0 of 30 days.     During the past month, estimate how many nights per week that you obtained enough sleep? 30 of 30 days.     During the past [place], estimate how many nights per week you obtained good quality sleep? 15 of 30 days    During the past month, how many days per week were you happy with how you felt? 25 of 30 days     During the past month, how many days per week were you troubled by tiredness or fatigue? 10 of 30 days    Is your sleep disturbed by:   Bed partner: NO}  Children: NO\"}  Noise: {NO\"}  Pets: {NO}  Other:     Surgical History: {PAST SURGICAL HISTORY:192627178}    Medical Conditions: {PAST MEDICAL HISTORY:348137705}    Medications: { :[id number]}    Are you currently having any of the following symptoms?   General:   Obvious weight gain or loss {NO\"}  Fever, chills or sweats {YES at night  Drug allergies: NO    Eyes:   Changes in vision {No\"}  Blind spots {No}  Double vision {NO\"}  Other     Ear, Nose and Throat:   Ear pain {NO\"}  Sore throat {No}  Sinus pain {NO\"}  Post-nasal drip {NO}  Runny nose {NO}  Bloody nose {NO\"}    Heart:   Rapid or irregular heart beat {NO\"}  Chest pain or pressure {NO}  Out of breath when lying down {NO}  Swelling in feet or legs NO\"}  High blood pressure {YES\"}  Heart disease {NO}    Nervous system   Headaches {NO\"}  Weakness in arms or legs {NO\"}  Numbness in arms of legs {NO}  Other:     Skin  Rashes {NO\"}  New moles or skin changes {NO}  Other     Lungs  Shortness of breath at rest {NO  Shortness of breath with activity {NO\"}  Dry cough {NO  Coughing up mucous or phlegm " "{NO}  Coughing up blood {No}  Wheezing when breathing NO}    Lymph System  Swollen lymph nodes {NO\"}  New lumps or bumps {NO}  Changes in breasts or discharge {NO\"}    Digestive System   Nausea or vomiting {NO\"}  Loose or watery stools {NO}  Hard, dry stools (constipation) {NO}  Fat or grease in stools NO\"}  Blood in stools {NO}  Stools are black or bloody {NO\"}  Abdominal (belly) pain NO}    Urinary Tract   Pain when you urinate (pee) {No}  Blood in your urine [place]\"}  Urinate (pee) more than normal {No}  Irregular periods NA}    Muscles and bones   Muscle pain {NO\"}  Joint or bone pain {NO\"}  Swollen joints {No}  Other     Glands  Increased thirst or urination {NO\"}  Diabetes  YES\"}  Morning glucose: 140  Afternoon glucose: 120    Mental Health  Depression {YES }  Anxiety {NO  Other mental health issues:        "

## 2018-07-11 NOTE — PROGRESS NOTES
LETY MIKE       Name: Dirk Terrell MRN# [id number]   Age: 62 year old YOB: 1955     Stop Bang questionnaire completed with a score of >3 to allow for HST     Have you been told you snore loudly (louder than talking or loud enough to be heard through doors)? {YES    Do you often feel tired, fatigued, or sleepy during the daytime? {YES/    Has anyone observed you stop breathing during your sleep? {YES/}    Do you have or are you being treated for high blood pressure? {YES//    Is your BMI greater than 35? {NO    Is your neck size circumference 16 inches or greater? {YES    Are you over 50 years old? {YES    Stop Bang Score (# of yes): 6

## 2018-07-11 NOTE — Clinical Note
Chart is ready for review questionnaire is complete  Patient snores very loud and witnessed apneas High BP and Diabetes

## 2018-07-12 ENCOUNTER — DOCUMENTATION ONLY (OUTPATIENT)
Dept: SLEEP MEDICINE | Facility: HOSPITAL | Age: 63
End: 2018-07-12

## 2018-07-12 DIAGNOSIS — Q05.4 SPINA BIFIDA WITH HYDROCEPHALUS, UNSPECIFIED SPINAL REGION (H): Primary | ICD-10-CM

## 2018-07-12 DIAGNOSIS — R06.83 SNORING: ICD-10-CM

## 2018-07-12 DIAGNOSIS — R06.81 APNEA: ICD-10-CM

## 2018-07-12 DIAGNOSIS — E11.9 CONTROLLED TYPE 2 DIABETES MELLITUS WITHOUT COMPLICATION, WITHOUT LONG-TERM CURRENT USE OF INSULIN (H): ICD-10-CM

## 2018-07-12 DIAGNOSIS — I10 ESSENTIAL HYPERTENSION: ICD-10-CM

## 2018-07-12 NOTE — PATIENT INSTRUCTIONS
In order to help your stay at the Sleep Center to be as comfortable as possible and to obtain the best sleep study possible, the Sleep Center Staff has established the following guidelines:    1.  Please attempt to be here 15 minutes prior to your scheduled appointment time.  If you anticipate being late or you cannot make your overnight appointment, please call us at [phone] or call [phone] and ask for the Sleep Center.  PLEASE MAKE EVERY ATTEMPT TO MAKE YOUR APPOINTMENT.  A SLEEP TECHNICIAN AND A SLEEP ROOM HAS BEEN RESERVED JUST FOR YOU.    2. When you arrive at [facility], please park in the upper lot, by [address].  Enter the hospital at the Emergency Room Entrance.  Follow the signs to the Emergency Room Admitting Desk and tell them you are here for a sleep study in the Sleep Disorder Center.    3. Do not stop any medications, unless specifically requested.  Be sure to bring all medications that you need with you.    4. Do not use any hair creams or gels, moisturizers, rinses or sprays the day of your study.  FOR MALES:  if you are usually clean shaven, please shave before you come in; FOR FEMALES:  do not wear make-up or be prepared to remove it.  This will improve the quality of the study.    5. Please DO NOT USE CAFFEINE OR ALCOHOL after 2:00 PM the day of your test unless advised otherwise.    6. Do not take any naps the day of your test.    7. Attachment of monitoring equipment will take approximately one hour, including an explanation of the test.    8. Bring comfortable night clothes to sleep in, two-piece, cotton pajamas or shorts are best.    9. If you have a pillow that you prefer using, please bring it with you; but do not forget to take it home with you in the morning.    10. Most of our studies are complete by approximately 7:00 AM.  In most instances we may wake you during your normal wake time or the time you request to be awakened.    If you have any special needs or  questions related to this information or your test, please feel free to call the Sleep Disorder Center at [phone] or [phone] and ask for the Sleep Disorder Center.  Please make every effort to keep your appointment.  A sleep technician and a sleep room have been reserved just for you.  In the event that you are unable to make your appointment, please call as soon as possible to notify us of your cancellation.

## 2018-07-12 NOTE — PROGRESS NOTES
Chart review for sleep testing.    63 yo M with history of depression, DM II, HTN, spina bifida with hydrocephalus, obesity, hypercholesterolemia with high clinical suspicion for sleep disordered breathing.    Multiple year history of reported snoring, observed apnea, frequent night-time awakenings.    STOP-BANG score of 6.  ESS of 5.    Yes to using alcohol 2+ night per week to fall asleep.  Insomnia concerns of no difficulty falling asleep, will have 5+ brief awakenings per night.  Denies parasomnias.    Yes for snoring, observed apnea, family history of RUBIN.    History of hydrocephalus:  Unclear to me, unsure if shunt in place.  Unsure if history of shunt malfunctions.    A/P:  63 yo M with history of depression, DM II, HTN, spina bifida with hydrocephalus, obesity, hypercholesterolemia with high clinical suspicion for sleep disordered breathing.    1.)  High likelihood of sleep disordered breathing  2.)  Less concern for central sleep apnea related to hydrocephalus.   - STOP-BANG score of 6.   - Would appear to be candidate for either home sleep testing or in-lab PSG.  Based on mobility or if need for night-time assistance, he likely would be a good candidate for HST.  I will place order for HST.  I also don't see strong evidence for concern for persistent increased ICP from hydrocephalus that would increase risk for central apnea.

## 2018-07-12 NOTE — Clinical Note
Hi     I had origanally sent you a note to say Dirk chose to do a HST    His insurance denied that they require an In Lab So I will be changing that

## 2018-07-16 ENCOUNTER — THERAPY VISIT (OUTPATIENT)
Dept: SLEEP MEDICINE | Facility: HOSPITAL | Age: 63
End: 2018-07-16
Attending: FAMILY MEDICINE
Payer: COMMERCIAL

## 2018-07-16 DIAGNOSIS — E11.9 CONTROLLED TYPE 2 DIABETES MELLITUS WITHOUT COMPLICATION, WITHOUT LONG-TERM CURRENT USE OF INSULIN (H): ICD-10-CM

## 2018-07-16 DIAGNOSIS — I10 ESSENTIAL HYPERTENSION: ICD-10-CM

## 2018-07-16 DIAGNOSIS — R06.83 SNORING: ICD-10-CM

## 2018-07-16 DIAGNOSIS — R40.0 DAYTIME SLEEPINESS: ICD-10-CM

## 2018-07-16 DIAGNOSIS — R06.81 APNEA: ICD-10-CM

## 2018-07-16 DIAGNOSIS — Q05.4 SPINA BIFIDA WITH HYDROCEPHALUS, UNSPECIFIED SPINAL REGION (H): ICD-10-CM

## 2018-07-16 PROCEDURE — 95811 POLYSOM 6/>YRS CPAP 4/> PARM: CPT

## 2018-07-16 PROCEDURE — 95811 POLYSOM 6/>YRS CPAP 4/> PARM: CPT | Mod: 26 | Performed by: FAMILY MEDICINE

## 2018-07-16 NOTE — MR AVS SNAPSHOT
After Visit Summary   7/16/2018    Dirk Terrell    MRN: [id number]           Patient Information     Date Of Birth          1955        Visit Information        Provider Department      7/16/2018 8:30 PM [facility]        Care Instructions    In order to help your stay at the Sleep Center to be as comfortable as possible and to obtain the best sleep study possible, the Sleep Center Staff has established the following guidelines:    1.  Please attempt to be here 15 minutes prior to your scheduled appointment time.  If you anticipate being late or you cannot make your overnight appointment, please call us at [phone] or call [phone] and ask for the Sleep Center.  PLEASE MAKE EVERY ATTEMPT TO MAKE YOUR APPOINTMENT.  A SLEEP TECHNICIAN AND A SLEEP ROOM HAS BEEN RESERVED JUST FOR YOU.    2. When you arrive at [facility], please park in the upper lot, by [address].  Enter the hospital at the Emergency Room Entrance.  Follow the signs to the Emergency Room Admitting Desk and tell them you are here for a sleep study in the Sleep Disorder Center.    3. Do not stop any medications, unless specifically requested.  Be sure to bring all medications that you need with you.    4. Do not use any hair creams or gels, moisturizers, rinses or sprays the day of your study.  FOR MALES:  if you are usually clean shaven, please shave before you come in; FOR FEMALES:  do not wear make-up or be prepared to remove it.  This will improve the quality of the study.    5. Please DO NOT USE CAFFEINE OR ALCOHOL after 2:00 PM the day of your test unless advised otherwise.    6. Do not take any naps the day of your test.    7. Attachment of monitoring equipment will take approximately one hour, including an explanation of the test.    8. Bring comfortable night clothes to sleep in, two-piece, cotton pajamas or shorts are best.    9. If you have a pillow that you prefer using, please  bring it with you; but do not forget to take it home with you in the morning.    10. Most of our studies are complete by approximately 7:00 AM.  In most instances we may wake you during your normal wake time or the time you request to be awakened.    If you have any special needs or questions related to this information or your test, please feel free to call the Sleep Disorder Center at [phone] or [phone] and ask for the Sleep Disorder Center.  Please make every effort to keep your appointment.  A sleep technician and a sleep room have been reserved just for you.  In the event that you are unable to make your appointment, please call as soon as possible to notify us of your cancellation.            Follow-ups after your visit        Your next 10 appointments already scheduled     Jul 16, 2018  8:30 PM CDT   PSG Split with [facility]   [facility] ([facility] )    [address] [place] [address] [id number] [phone]            Sep 27, 2018  9:15 AM CDT   (Arrive by 9:00 AM)   SHORT with LANDY Garrett MD   [facility] ([facility] )    [address] [place] [address] [id number] [phone]              Future tests that were ordered for you today     Open Future Orders        Priority Expected Expires Ordered    HST-Home Sleep Apnea Test Routine  1/11/2019 7/12/2018            Who to contact     If you have questions or need follow up information about today's clinic visit or your schedule please contact [facility] directly at [phone].  Normal or non-critical lab and imaging results will be communicated to you by MyChart, letter or phone within 4 business days after the clinic has received the results. If you do not hear from us within 7 days, please contact the clinic through MyChart or phone. If you have a critical or abnormal lab result, we will notify you by phone as soon as possible.  Submit refill requests through [employer] or call your pharmacy and  they will forward the refill request to us. Please allow 3 business days for your refill to be completed.          Additional Information About Your Visit        [employer] Information     [employer] gives you secure access to your electronic health record. If you see a primary care provider, you can also send messages to your care team and make appointments. If you have questions, please call your primary care clinic.  If you do not have a primary care provider, please call [phone] and they will assist you.        Care EveryWhere ID     This is your Care EveryWhere ID. This could be used by other organizations to access your [place] medical records  [id number]         Blood Pressure from Last 3 Encounters:   07/03/18 132/80   11/09/17 128/70    Weight from Last 3 Encounters:   07/03/18 202 lb (91.6 kg)   11/09/17 196 lb (88.9 kg)              Today, you had the following     No orders found for display       Primary Care Provider Office Phone # Fax #    Cristi Garcia [phone]       [place] [address]        Equal Access to Services     [facility]: Hadii aad ku hadasho Soomaali, waaxda luqadaha, qaybta kaalmada adeegyada, waxkhoa awad . So [facility] [phone].    ATENCIÓN: Si habla español, tiene a haskins disposición servicios gratuitos de asistencia lingüística. [facility] [phone].    We comply with applicable federal civil rights laws and [place] laws. We do not discriminate on the basis of race, color, national origin, age, disability, sex, sexual orientation, or gender identity.            Thank you!     Thank you for choosing [facility]  for your care. Our goal is always to provide you with excellent care. Hearing back from our patients is one way we can continue to improve our services. Please take a few minutes to complete the written survey that you may receive in the mail after your visit with us. Thank you!              Your Updated Medication List - Protect others around you: Learn how to safely use, store and throw away your medicines at www.disposemymeds.org.          This list is accurate as of 7/12/18  4:15 PM.  Always use your most recent med list.                   Brand Name Dispense Instructions for use Diagnosis    blood glucose monitoring lancets     100 each    Use to test blood sugar one time daily or as directed.    Controlled type 2 diabetes mellitus without complication, without long-term current use of insulin (H)       BLOOD GLUCOSE TEST STRIPS Strp     100 each    Test once daily    Controlled type 2 diabetes mellitus without complication, without long-term current use of insulin (H)       lisinopril 10 MG tablet    PRINIVIL/ZESTRIL    90 tablet    Take 1 tablet (10 mg) by mouth daily    Essential hypertension       lovastatin 10 MG tablet    MEVACOR    90 tablet    Take 2 tablets (20 mg) by mouth At Bedtime    Pure hypercholesterolemia       metFORMIN 1000 MG tablet    GLUCOPHAGE    180 tablet    1000 mg twice a day    Controlled type 2 diabetes mellitus without complication, without long-term current use of insulin (H)       sertraline 50 MG tablet    ZOLOFT    60 tablet    Take 2 tablets (100 mg) by mouth daily    Anxiety       sildenafil 100 MG tablet    VIAGRA     Take 100 mg by mouth

## 2018-07-17 NOTE — NURSING NOTE
Completed a split night PSG per provider order.    Preliminary AHI 30.  A final therapeutic PAP pressure was achieved.    Supine REM was seen on therapeutic pressure.    Patient reports feeling refreshed in AM.

## 2018-07-20 ENCOUNTER — TRANSFERRED RECORDS (OUTPATIENT)
Dept: HEALTH INFORMATION MANAGEMENT | Facility: CLINIC | Age: 63
End: 2018-07-20

## 2018-07-25 NOTE — PROCEDURES
Patient summary:  63 yo M with history of depression, DM II, HTN, spina bifida with hydrocephalus, obesity, hypercholesterolemia with high clinical suspicion for sleep disordered breathing.  Multiple year history of reported snoring, observed apnea, frequent night-time awakenings.  Low clinical concern for current hydrocephalus or increased intra-cranial pressure.  STOP-BANG score of 6.  ESS of 5.    Interp for PSG dated 7/16/2018.    AHI 36.1, events appearing primarily obstructive in nature.  Mean Spo2 91%, pernell SpO2 82%, 23.6% of recording was <= 89%.  EKG appeared NSR.  No PLM's observed.  CPAP titrated to zenith of 11 cm H2O with both 9 cm H2O and 11 cm H2O appearing effective, with supine REM observed on 11 cm H2O.    A/P:  - Severe RUBIN with sleep-associated hypoxemia    Consider start of treatment with 11 cm H2O, or auto-titrate 5-11 cm H2O.  Recommend weight management.

## 2018-08-15 PROBLEM — E66.9 OBESITY: Chronic | Status: ACTIVE | Noted: 2017-11-09

## 2018-08-15 PROBLEM — M51.379 DEGENERATION OF LUMBAR OR LUMBOSACRAL INTERVERTEBRAL DISC: Status: ACTIVE | Noted: 2017-11-09

## 2018-08-15 PROBLEM — Q05.4 SPINA BIFIDA WITH HYDROCEPHALUS (H): Status: ACTIVE | Noted: 2017-11-09

## 2018-08-15 PROBLEM — F41.1 GAD (GENERALIZED ANXIETY DISORDER): Status: ACTIVE | Noted: 2018-08-15

## 2018-08-15 PROBLEM — G47.33 OSA (OBSTRUCTIVE SLEEP APNEA): Chronic | Status: ACTIVE | Noted: 2018-08-15

## 2018-08-15 PROBLEM — I10 ESSENTIAL HYPERTENSION: Chronic | Status: ACTIVE | Noted: 2017-11-09

## 2018-08-15 PROBLEM — M10.9 GOUT: Status: ACTIVE | Noted: 2017-11-09

## 2018-08-15 PROBLEM — E11.9 CONTROLLED TYPE 2 DIABETES MELLITUS WITHOUT COMPLICATION, WITHOUT LONG-TERM CURRENT USE OF INSULIN (H): Chronic | Status: ACTIVE | Noted: 2017-11-09

## 2018-08-15 PROBLEM — E66.9 OBESITY: Status: ACTIVE | Noted: 2017-11-09

## 2018-08-15 PROBLEM — E78.00 PURE HYPERCHOLESTEROLEMIA: Chronic | Status: ACTIVE | Noted: 2017-11-09

## 2018-08-21 ENCOUNTER — OFFICE VISIT (OUTPATIENT)
Dept: SLEEP MEDICINE | Facility: HOSPITAL | Age: 63
End: 2018-08-21
Attending: INTERNAL MEDICINE
Payer: COMMERCIAL

## 2018-08-21 VITALS
HEIGHT: 66 IN | SYSTOLIC BLOOD PRESSURE: 130 MMHG | BODY MASS INDEX: 30.53 KG/M2 | DIASTOLIC BLOOD PRESSURE: 78 MMHG | HEART RATE: 70 BPM | OXYGEN SATURATION: 97 % | WEIGHT: 190 LBS | RESPIRATION RATE: 16 BRPM

## 2018-08-21 DIAGNOSIS — G47.33 OSA (OBSTRUCTIVE SLEEP APNEA): Primary | Chronic | ICD-10-CM

## 2018-08-21 DIAGNOSIS — R06.81 APNEA: ICD-10-CM

## 2018-08-21 DIAGNOSIS — E66.09 OBESITY DUE TO EXCESS CALORIES WITH SERIOUS COMORBIDITY, UNSPECIFIED CLASSIFICATION: Chronic | ICD-10-CM

## 2018-08-21 DIAGNOSIS — R06.83 SNORING: ICD-10-CM

## 2018-08-21 DIAGNOSIS — I10 ESSENTIAL HYPERTENSION: ICD-10-CM

## 2018-08-21 DIAGNOSIS — E11.8 TYPE 2 DIABETES MELLITUS WITH COMPLICATION, UNSPECIFIED LONG TERM INSULIN USE STATUS: ICD-10-CM

## 2018-08-21 PROBLEM — M10.9 GOUT: Status: RESOLVED | Noted: 2017-11-09 | Resolved: 2018-08-21

## 2018-08-21 PROBLEM — Q07.00 ARNOLD-CHIARI MALFORMATION (H): Chronic | Status: ACTIVE | Noted: 2017-11-09

## 2018-08-21 PROBLEM — M51.379 DEGENERATION OF LUMBAR OR LUMBOSACRAL INTERVERTEBRAL DISC: Chronic | Status: ACTIVE | Noted: 2017-11-09

## 2018-08-21 PROCEDURE — 99212 OFFICE O/P EST SF 10 MIN: CPT | Performed by: INTERNAL MEDICINE

## 2018-08-21 PROCEDURE — G0463 HOSPITAL OUTPT CLINIC VISIT: HCPCS

## 2018-08-21 NOTE — PROGRESS NOTES
Sleep Consultation:    Date on this visit: 8/21/2018    Dirk Terrell is sent by LANDY Garrett for a sleep consultation regarding snoring, apneas, diabetes mellitus, hypertension.    Primary Physician: LANDY Garrett     Chief Complaint   Patient presents with     Study Results     He presented with multiple year history of reported snoring, observed apnea, frequent night-time awakenings, occasional sleep maintenance difficulties, occasional night sweats. Comorbid hypertension, diabetes mellitus     Dirk goes to bed at 10-11 PM during the week. He gets up at 6:30 AM without an alarm. He falls asleep in 5-10 minutes.  Dirk denies difficulty falling asleep.  He wakes up 5-10 times a night. He has difficulty falling back to sleep 1-2 times a week. Dirk wakes up to uncertain reasons.  On weekends, schedule is similar.  Patient gets an average of 7 hours of sleep per night.     Patient does not use electronics in bed and watch TV in bed.     Patient does not have a regular bed partner.Patient sleeps on his back << side. He denies no morning headaches and restless legs.     Dirk denies reflux at night.      Dirk naps rarely. He takes no inadvertant naps.  He denies dozing while driving. He uses 2-3 cups/day of coffee. Last caffeine intake is usually before noon.    Interp for PSG dated 7/16/2018 (190#).     AHI 36.1, events appearing primarily obstructive in nature.  Mean Spo2 91%, pernell SpO2 82%, 23.6% of recording was <= 89%.  EKG appeared NSR.  No PLM's observed.  CPAP titrated to zenith of 11 cm H2O with both 9 cm H2O and 11 cm H2O appearing effective, with supine REM observed on 11 cm H2O.       Allergies:    No Known Allergies    Medications:    Current Outpatient Prescriptions   Medication Sig Dispense Refill     blood glucose monitoring (SHAI MICROLET) lancets Use to test blood sugar one time daily or as directed. 100 each 3     Glucose Blood (BLOOD GLUCOSE TEST STRIPS) STRP Test once daily 100 each 3      lisinopril (PRINIVIL/ZESTRIL) 10 MG tablet Take 1 tablet (10 mg) by mouth daily 90 tablet 3     lovastatin (MEVACOR) 10 MG tablet Take 2 tablets (20 mg) by mouth At Bedtime 90 tablet 3     metFORMIN (GLUCOPHAGE) 1000 MG tablet 1000 mg twice a day 180 tablet 3     sertraline (ZOLOFT) 50 MG tablet Take 2 tablets (100 mg) by mouth daily 60 tablet 5     sildenafil (VIAGRA) 100 MG tablet Take 100 mg by mouth         Problem List:  Patient Active Problem List    Diagnosis Date Noted     RUBIN (obstructive sleep apnea)- severe (AHI 36) 08/15/2018     Priority: Medium     PSG 7/16/2018. AHI 36.1, events appearing primarily obstructive in nature.  Mean Spo2 91%, pernell SpO2 82%, 23.6% of recording was <= 89%.  No PLM's observed.  CPAP titrated to zenith of 11 cm H2O with both 9 cm H2O and 11 cm H2O appearing effective, with supine REM observed on 11 cm H2O       Arnold-Chiari malformation (H) 11/09/2017     Priority: Medium     Overview:   with cervical and thoracic syrinx       Controlled type 2 diabetes mellitus without complication, without long-term current use of insulin (H) 11/09/2017     Priority: Medium     Essential hypertension 11/09/2017     Priority: Medium     Pure hypercholesterolemia 11/09/2017     Priority: Medium     Depression, major 11/06/2013     Priority: Medium     Degeneration of lumbar or lumbosacral intervertebral disc 11/09/2017     Priority: Low     Obesity 11/09/2017     Priority: Low     BMI - 34       Erectile dysfunction, unspecified erectile dysfunction type 10/12/2016     Priority: Low     Microalbuminuria 03/27/2014     Priority: Low        Past Medical/Surgical History:  Past Medical History:   Diagnosis Date     Gout 2014    history of gout      Past Surgical History:   Procedure Laterality Date     COLONOSCOPY N/A 2016    one polyp was told to repeat in 5 years     CYSTOSCOPY,+URETEROSCOPY  2002    stent     ORTHOPEDIC SURGERY  2013    Left rotator cuff surgery     ORTHOPEDIC SURGERY  2011  "   left rotator cuff surgery     TONSILLECTOMY  1963       Social History:  Social History     Social History     Marital status: Single     Spouse name: N/A     Number of children: N/A     Years of education: N/A     Occupational History     Retired      [employer]     Social History Main Topics     Smoking status: Former Smoker     Packs/day: 1.50     Years: 15.00     Quit date: 1988     Smokeless tobacco: Never Used     Alcohol use Yes      Comment: 12/week     Drug use: No     Sexual activity: Not on file     Other Topics Concern     Not on file     Social History Narrative       Family History:  No family history on file.    Review of Systems:  A complete review of systems reviewed by me is negative with the exeption of what has been mentioned in the history of present illness.  General:   Obvious weight gain or loss n  Fever, chills or sweats YES at night  Drug allergies: NO     Eyes:   Changes in vision n  Blind spots n  Double vision n  Other      Ear, Nose and Throat:   Ear pain n  Sore throat n  Sinus pain n  Post-nasal drip n  Runny nose n  Bloody nose n     Heart:   Rapid or irregular heart beat n  Chest pain or pressure n  Out of breath when lying down n  Swelling in feet or legs NO\"}  High blood pressure y  Heart disease n     Nervous system   Headaches n  Weakness in arms or legs n  Numbness in arms of legs n  Other:      Skin  Rashes n  New moles or skin changes n  Other      Lungs  Shortness of breath at rest NO  Shortness of breath with activity n  Dry cough NO  Coughing up mucous or phlegm n  Coughing up blood n  Wheezing when breathing NO     Lymph System  Swollen lymph nodes n  New lumps or bumps n  Changes in breasts or discharge n     Digestive System   Nausea or vomiting n  Loose or watery stools n  Hard, dry stools (constipation) n  Fat or grease in stools NO  Blood in stools n  Stools are black or bloody n  Abdominal (belly) pain NO     Urinary Tract   Pain " "when you urinate (pee) n  Blood in your urine No  Urinate (pee) more than normal n  Irregular periods NA     Muscles and bones   Muscle pain n  Joint or bone pain n  Swollen joints n  Other      Glands  Increased thirst or urination n  Diabetes  YES\"}  Morning glucose: 140  Afternoon glucose: 120     Mental Health  Depression 7  Anxiety NO       Physical Examination:  Vitals: /78  Pulse 70  Resp 16  Ht 5' 6\" (1.676 m)  Wt 190 lb (86.2 kg)  SpO2 97%  BMI 30.67 kg/m2  BMI= Body mass index is 30.67 kg/(m^2).    Neck Cir (cm): 45 cm    Magnolia Total Score 8/21/2018   Total score - [place] 1       GENERAL APPEARANCE: healthy, alert and no distress  EYES: Eyes grossly normal to inspection and conjunctivae and sclerae normal  HENT: ear canals and TM's normal, nose and mouth without ulcers or lesions and oropharynx crowded  NECK: no adenopathy, no asymmetry, masses, or scars and thyroid normal to palpation  RESP: lungs clear to auscultation - no rales, rhonchi or wheezes  CV: regular rates and rhythm, normal S1 S2, no S3 or S4 and no murmur, click or rub  ABDOMEN: soft, nontender, without hepatosplenomegaly or masses  MS: extremities normal- no gross deformities noted  NEURO: Normal strength and tone, mentation intact, speech normal and cranial nerves 2-12 intact  PSYCH: mentation appears normal and affect normal/bright  Mallampati Class: IV.  Tonsillar Stage: not visualized.    Impression/Plan:    Severe RUBIN with sleep-associated hypoxemia   Treatment options reviewed.   Elect auto CPAP 9-15 cmh20    Obstructive sleep apnea reviewed.  Complications of untreated sleep apnea were reviewed.    Ahmet Godfrey     CC: LANDY Garrett        "

## 2018-08-21 NOTE — NURSING NOTE
"Chief Complaint   Patient presents with     Study Results       Initial /78  Pulse 70  Resp 16  Ht 5' 6\" (1.676 m)  Wt 190 lb (86.2 kg)  SpO2 97%  BMI 30.67 kg/m2 Estimated body mass index is 30.67 kg/(m^2) as calculated from the following:    Height as of this encounter: 5' 6\" (1.676 m).    Weight as of this encounter: 190 lb (86.2 kg).    Medication Reconciliation: complete    Neck circumference: 17.75 inches / 45 centimeters.    DME: [facility]  "

## 2018-08-21 NOTE — MR AVS SNAPSHOT
After Visit Summary   8/21/2018    Dirk Terrell    MRN: [id number]           Patient Information     Date Of Birth          1955        Visit Information        Provider Department      8/21/2018 10:30 AM Ahmet Godfrey MD [facility]        Today's Diagnoses     RUBIN (obstructive sleep apnea)- severe (AHI 36)    -  1    Type 2 diabetes mellitus with complication, unspecified long term insulin use status (H)        Essential hypertension        Apnea        Snoring        Obesity due to excess calories with serious comorbidity, unspecified classification           Follow-ups after your visit        Follow-up notes from your care team     Return in about 3 months (around 11/21/2018), or if symptoms worsen or fail to improve, for Routine Visit.      Your next 10 appointments already scheduled     Sep 27, 2018  9:15 AM CDT   (Arrive by 9:00 AM)   SHORT with LANDY Garrett MD   [facility] ([facility] )    [address]  [facility] [address]   [phone]              Who to contact     If you have questions or need follow up information about today's clinic visit or your schedule please contact [facility] directly at [phone].  Normal or non-critical lab and imaging results will be communicated to you by MyChart, letter or phone within 4 business days after the clinic has received the results. If you do not hear from us within 7 days, please contact the clinic through [employer] or phone. If you have a critical or abnormal lab result, we will notify you by phone as soon as possible.  Submit refill requests through [employer] or call your pharmacy and they will forward the refill request to us. Please allow 3 business days for your refill to be completed.          Additional Information About Your Visit        MyChart Information     [employer] gives you secure access to your electronic health record. If you see a primary care provider, you can also send messages to  "your care team and make appointments. If you have questions, please call your primary care clinic.  If you do not have a primary care provider, please call [phone] and they will assist you.        Care EveryWhere ID     This is your Care EveryWhere ID. This could be used by other organizations to access your [place] medical records  [id number]        Your Vitals Were     Pulse Respirations Height Pulse Oximetry BMI (Body Mass Index)       70 16 5' 6\" (1.676 m) 97% 30.67 kg/m2        Blood Pressure from Last 3 Encounters:   08/21/18 130/78   07/03/18 132/80   11/09/17 128/70    Weight from Last 3 Encounters:   08/21/18 190 lb (86.2 kg)   07/03/18 202 lb (91.6 kg)   11/09/17 196 lb (88.9 kg)              We Performed the Following     Comprehensive DME     HST-Home Sleep Apnea Test          Today's Medication Changes          These changes are accurate as of 8/21/18 11:11 AM.  If you have any questions, ask your nurse or doctor.               Start taking these medicines.        Dose/Directions    order for DME   Used for:  RUBIN (obstructive sleep apnea)   Started by:  Ahmet Godfrey MD        autoCPAP 9-15 cmh20   Quantity:  1 Device   Refills:  0            Where to get your medicines      Information about where to get these medications is not yet available     ! Ask your nurse or doctor about these medications     order for DME                Primary Care Provider Office Phone # Fax #    R Pelon Garrett [phone]       [address] [place] [address]        Equal Access to Services     [facility]: Hadii michael ku hadasho Soomaali, waaxda luqadaha, qaybta kaalmada terranceegyada, lore harris. So [facility] [phone].    ATENCIÓN: Si habla español, tiene a haskins disposición servicios gratuitos de asistencia lingüística. Llame al [phone].    We comply with applicable federal civil rights laws and [place] laws. We do not discriminate on the basis of race, " color, national origin, age, disability, sex, sexual orientation, or gender identity.            Thank you!     Thank you for choosing [facility]  for your care. Our goal is always to provide you with excellent care. Hearing back from our patients is one way we can continue to improve our services. Please take a few minutes to complete the written survey that you may receive in the mail after your visit with us. Thank you!             Your Updated Medication List - Protect others around you: Learn how to safely use, store and throw away your medicines at www.disposemymeds.org.          This list is accurate as of 8/21/18 11:11 AM.  Always use your most recent med list.                   Brand Name Dispense Instructions for use Diagnosis    blood glucose monitoring lancets     100 each    Use to test blood sugar one time daily or as directed.    Controlled type 2 diabetes mellitus without complication, without long-term current use of insulin (H)       BLOOD GLUCOSE TEST STRIPS Strp     100 each    Test once daily    Controlled type 2 diabetes mellitus without complication, without long-term current use of insulin (H)       lisinopril 10 MG tablet    PRINIVIL/ZESTRIL    90 tablet    Take 1 tablet (10 mg) by mouth daily    Essential hypertension       lovastatin 10 MG tablet    MEVACOR    90 tablet    Take 2 tablets (20 mg) by mouth At Bedtime    Pure hypercholesterolemia       metFORMIN 1000 MG tablet    GLUCOPHAGE    180 tablet    1000 mg twice a day    Controlled type 2 diabetes mellitus without complication, without long-term current use of insulin (H)       order for DME     1 Device    autoCPAP 9-15 cmh20    RUBIN (obstructive sleep apnea)       sertraline 50 MG tablet    ZOLOFT    60 tablet    Take 2 tablets (100 mg) by mouth daily    Anxiety       sildenafil 100 MG tablet    VIAGRA     Take 100 mg by mouth

## 2018-08-25 ENCOUNTER — DOCUMENTATION ONLY (OUTPATIENT)
Dept: SLEEP MEDICINE | Facility: HOSPITAL | Age: 63
End: 2018-08-25

## 2018-08-29 NOTE — PROGRESS NOTES
Patient was offered choice of vendor and chose [facility].  Patient Dirk Terrell was set up at [facility] on August 29, 2018. Patient received a Resmed AirSense 10 CPAP. Pressures were set at 9/15 cm H2O.   Patient s ramp is 5 cm H2O for Auto and FLEX/EPR is 1.  Patient received a Resmed Mask name: Airfit N20  Full Face mask Size Medium, heated tubing and heated humidifier.  Patient is not enrolled in the STM Program and does need to meet compliance. Patient has a follow up on to be determined with Dr. Godfrey.    Sheri Preciado

## 2018-09-27 ENCOUNTER — OFFICE VISIT (OUTPATIENT)
Dept: FAMILY MEDICINE | Facility: OTHER | Age: 63
End: 2018-09-27
Attending: FAMILY MEDICINE
Payer: COMMERCIAL

## 2018-09-27 VITALS
DIASTOLIC BLOOD PRESSURE: 70 MMHG | WEIGHT: 201 LBS | HEART RATE: 63 BPM | TEMPERATURE: 96.3 F | BODY MASS INDEX: 32.44 KG/M2 | SYSTOLIC BLOOD PRESSURE: 120 MMHG | OXYGEN SATURATION: 97 %

## 2018-09-27 DIAGNOSIS — M10.9 ACUTE GOUTY ARTHRITIS: ICD-10-CM

## 2018-09-27 DIAGNOSIS — E78.5 ELEVATED LIPIDS: ICD-10-CM

## 2018-09-27 DIAGNOSIS — Z23 NEED FOR PROPHYLACTIC VACCINATION AND INOCULATION AGAINST INFLUENZA: Primary | ICD-10-CM

## 2018-09-27 DIAGNOSIS — I10 ESSENTIAL HYPERTENSION: Chronic | ICD-10-CM

## 2018-09-27 DIAGNOSIS — E11.9 CONTROLLED TYPE 2 DIABETES MELLITUS WITHOUT COMPLICATION, WITHOUT LONG-TERM CURRENT USE OF INSULIN (H): Chronic | ICD-10-CM

## 2018-09-27 DIAGNOSIS — Z23 NEED FOR VACCINATION: ICD-10-CM

## 2018-09-27 LAB
ANION GAP SERPL CALCULATED.3IONS-SCNC: 7 MMOL/L (ref 3–14)
AST SERPL W P-5'-P-CCNC: 37 U/L (ref 0–45)
BUN SERPL-MCNC: 17 MG/DL (ref 7–30)
CALCIUM SERPL-MCNC: 9.2 MG/DL (ref 8.5–10.1)
CHLORIDE SERPL-SCNC: 102 MMOL/L (ref 94–109)
CHOLEST SERPL-MCNC: 138 MG/DL
CO2 SERPL-SCNC: 27 MMOL/L (ref 20–32)
CREAT SERPL-MCNC: 0.85 MG/DL (ref 0.66–1.25)
EST. AVERAGE GLUCOSE BLD GHB EST-MCNC: 154 MG/DL
GFR SERPL CREATININE-BSD FRML MDRD: >90 ML/MIN/1.7M2
GLUCOSE SERPL-MCNC: 137 MG/DL (ref 70–99)
HBA1C MFR BLD: 7 % (ref 0–5.6)
HDLC SERPL-MCNC: 46 MG/DL
LDLC SERPL CALC-MCNC: 67 MG/DL
NONHDLC SERPL-MCNC: 92 MG/DL
POTASSIUM SERPL-SCNC: 4.2 MMOL/L (ref 3.4–5.3)
SODIUM SERPL-SCNC: 136 MMOL/L (ref 133–144)
TRIGL SERPL-MCNC: 123 MG/DL
URATE SERPL-MCNC: 5 MG/DL (ref 3.5–7.2)

## 2018-09-27 PROCEDURE — 40000788 ZZHCL STATISTIC ESTIMATED AVERAGE GLUCOSE: Mod: ZL | Performed by: FAMILY MEDICINE

## 2018-09-27 PROCEDURE — G0463 HOSPITAL OUTPT CLINIC VISIT: HCPCS | Mod: 25

## 2018-09-27 PROCEDURE — 90715 TDAP VACCINE 7 YRS/> IM: CPT | Performed by: FAMILY MEDICINE

## 2018-09-27 PROCEDURE — 36415 COLL VENOUS BLD VENIPUNCTURE: CPT | Mod: ZL | Performed by: FAMILY MEDICINE

## 2018-09-27 PROCEDURE — 90472 IMMUNIZATION ADMIN EACH ADD: CPT | Performed by: FAMILY MEDICINE

## 2018-09-27 PROCEDURE — 80061 LIPID PANEL: CPT | Mod: ZL | Performed by: FAMILY MEDICINE

## 2018-09-27 PROCEDURE — 84550 ASSAY OF BLOOD/URIC ACID: CPT | Mod: ZL | Performed by: FAMILY MEDICINE

## 2018-09-27 PROCEDURE — 90471 IMMUNIZATION ADMIN: CPT | Performed by: FAMILY MEDICINE

## 2018-09-27 PROCEDURE — 83036 HEMOGLOBIN GLYCOSYLATED A1C: CPT | Mod: ZL | Performed by: FAMILY MEDICINE

## 2018-09-27 PROCEDURE — 90682 RIV4 VACC RECOMBINANT DNA IM: CPT | Performed by: FAMILY MEDICINE

## 2018-09-27 PROCEDURE — 80048 BASIC METABOLIC PNL TOTAL CA: CPT | Mod: ZL | Performed by: FAMILY MEDICINE

## 2018-09-27 PROCEDURE — 84450 TRANSFERASE (AST) (SGOT): CPT | Mod: ZL | Performed by: FAMILY MEDICINE

## 2018-09-27 PROCEDURE — 99214 OFFICE O/P EST MOD 30 MIN: CPT | Performed by: FAMILY MEDICINE

## 2018-09-27 RX ORDER — INDOMETHACIN 50 MG/1
50 CAPSULE ORAL 3 TIMES DAILY PRN
Qty: 30 CAPSULE | Refills: 3 | Status: SHIPPED | OUTPATIENT
Start: 2018-09-27 | End: 2022-09-24

## 2018-09-27 ASSESSMENT — PAIN SCALES - GENERAL: PAINLEVEL: MODERATE PAIN (4)

## 2018-09-27 NOTE — NURSING NOTE
"Chief Complaint   Patient presents with     Sleep Apnea     Diabetes     Lipids       Initial /70  Pulse 63  Temp 96.3  F (35.7  C) (Tympanic)  Wt 201 lb (91.2 kg)  SpO2 97%  BMI 32.44 kg/m2 Estimated body mass index is 32.44 kg/(m^2) as calculated from the following:    Height as of 8/21/18: 5' 6\" (1.676 m).    Weight as of this encounter: 201 lb (91.2 kg).  Medication Reconciliation: complete    Jessica Forbes MA    "

## 2018-09-27 NOTE — MR AVS SNAPSHOT
After Visit Summary   9/27/2018    Dirk Terrell    MRN: [id number]           Patient Information     Date Of Birth          1955        Visit Information        Provider Department      9/27/2018 9:15 AM LANDY Garrett MD [facility]        Today's Diagnoses     Need for prophylactic vaccination and inoculation against influenza    -  1    Need for vaccination        Elevated lipids        Elevated glucose        Elevated hemoglobin A1c        Acute gouty arthritis        Essential hypertension           Follow-ups after your visit        Your next 10 appointments already scheduled     Oct 16, 2018  8:30 AM CDT   Return Visit with Ahmet Godfrey MD   [facility] ([facility] )    [address] [place] [id number]   [phone]              Who to contact     If you have questions or need follow up information about today's clinic visit or your schedule please contact [facility] directly at [phone].  Normal or non-critical lab and imaging results will be communicated to you by MyChart, letter or phone within 4 business days after the clinic has received the results. If you do not hear from us within 7 days, please contact the clinic through Evolution Roboticshart or phone. If you have a critical or abnormal lab result, we will notify you by phone as soon as possible.  Submit refill requests through Nextance or call your pharmacy and they will forward the refill request to us. Please allow 3 business days for your refill to be completed.          Additional Information About Your Visit        MyChart Information     Nextance gives you secure access to your electronic health record. If you see a primary care provider, you can also send messages to your care team and make appointments. If you have questions, please call your primary care clinic.  If you do not have a primary care provider, please call [phone] and they will assist you.         Care EveryWhere ID     This is your Care EveryWhere ID. This could be used by other organizations to access your [facility] medical records  [id number]        Your Vitals Were     Pulse Temperature Pulse Oximetry BMI (Body Mass Index)          63 96.3  F (35.7  C) (Tympanic) 97% 32.44 kg/m2         Blood Pressure from Last 3 Encounters:   09/27/18 120/70   08/21/18 130/78   07/03/18 132/80    Weight from Last 3 Encounters:   09/27/18 201 lb (91.2 kg)   08/21/18 190 lb (86.2 kg)   07/03/18 202 lb (91.6 kg)              We Performed the Following     AST     Basic metabolic panel     Each additional admin.  (Right click and add QUANTITY)  [60085]     FLU VAC, QUADRIVALENT (RIV4) RECOMBINANT DNA, IM     Hemoglobin A1c     Lipid Profile     TDAP VACCINE (ADACEL) [29491.002]     Uric acid     Vaccine Administration, Initial [65416]          Today's Medication Changes          These changes are accurate as of 9/27/18  9:30 AM.  If you have any questions, ask your nurse or doctor.               Start taking these medicines.        Dose/Directions    indomethacin 50 MG capsule   Commonly known as:  INDOCIN   Used for:  Acute gouty arthritis   Started by:  LANDY Garrett MD        Dose:  50 mg   Take 1 capsule (50 mg) by mouth 3 times daily as needed for moderate pain   Quantity:  30 capsule   Refills:  3            Where to get your medicines      These medications were sent to [id number] - [place], [place] - [address]   [id number] , [place] [address]     Phone:  [phone]     indomethacin 50 MG capsule                Primary Care Provider Office Phone # Fax #    R Pelon Garrett [phone]       [facility]  [place] [address]        Equal Access to Services     [facility]: Hadii michael Barba, waaxda luqadaha, qaybta kaalmada adetraceyyada, lore harris. So [facility] [phone].    ATENCIÓN: Si habla español, tiene a haskins disposición servicios  [place] de asistencia lingüística. Anu mcrae [phone].    We comply with applicable federal civil rights laws and [place] laws. We do not discriminate on the basis of race, color, national origin, age, disability, sex, sexual orientation, or gender identity.            Thank you!     Thank you for choosing [facility]  for your care. Our goal is always to provide you with excellent care. Hearing back from our patients is one way we can continue to improve our services. Please take a few minutes to complete the written survey that you may receive in the mail after your visit with us. Thank you!             Your Updated Medication List - Protect others around you: Learn how to safely use, store and throw away your medicines at www.disposemymeds.org.          This list is accurate as of 9/27/18  9:30 AM.  Always use your most recent med list.                   Brand Name Dispense Instructions for use Diagnosis    blood glucose monitoring lancets     100 each    Use to test blood sugar one time daily or as directed.    Controlled type 2 diabetes mellitus without complication, without long-term current use of insulin (H)       BLOOD GLUCOSE TEST STRIPS Strp     100 each    Test once daily    Controlled type 2 diabetes mellitus without complication, without long-term current use of insulin (H)       indomethacin 50 MG capsule    INDOCIN    30 capsule    Take 1 capsule (50 mg) by mouth 3 times daily as needed for moderate pain    Acute gouty arthritis       lisinopril 10 MG tablet    PRINIVIL/ZESTRIL    90 tablet    Take 1 tablet (10 mg) by mouth daily    Essential hypertension       lovastatin 10 MG tablet    MEVACOR    90 tablet    Take 2 tablets (20 mg) by mouth At Bedtime    Pure hypercholesterolemia       metFORMIN 1000 MG tablet    GLUCOPHAGE    180 tablet    1000 mg twice a day    Controlled type 2 diabetes mellitus without complication, without long-term current use of insulin (H)        order for DME     1 Device    autoCPAP 9-15 cmh20    RUBIN (obstructive sleep apnea)       sertraline 50 MG tablet    ZOLOFT    60 tablet    Take 2 tablets (100 mg) by mouth daily    Anxiety       sildenafil 100 MG tablet    VIAGRA     Take 100 mg by mouth

## 2018-10-16 ENCOUNTER — OFFICE VISIT (OUTPATIENT)
Dept: SLEEP MEDICINE | Facility: HOSPITAL | Age: 63
End: 2018-10-16
Attending: INTERNAL MEDICINE
Payer: COMMERCIAL

## 2018-10-16 VITALS
RESPIRATION RATE: 12 BRPM | HEART RATE: 63 BPM | SYSTOLIC BLOOD PRESSURE: 132 MMHG | OXYGEN SATURATION: 98 % | WEIGHT: 200 LBS | BODY MASS INDEX: 32.14 KG/M2 | HEIGHT: 66 IN | DIASTOLIC BLOOD PRESSURE: 68 MMHG

## 2018-10-16 DIAGNOSIS — G47.33 OSA (OBSTRUCTIVE SLEEP APNEA): Primary | ICD-10-CM

## 2018-10-16 DIAGNOSIS — F51.04 PSYCHOPHYSIOLOGIC INSOMNIA: ICD-10-CM

## 2018-10-16 PROCEDURE — G0463 HOSPITAL OUTPT CLINIC VISIT: HCPCS

## 2018-10-16 PROCEDURE — 99212 OFFICE O/P EST SF 10 MIN: CPT | Performed by: INTERNAL MEDICINE

## 2018-10-16 NOTE — PROGRESS NOTES
Obstructive Sleep Apnea - PAP Follow-Up Visit:    Chief Complaint   Patient presents with     Sleep Problem     follow for compliance       Dirk Terrell comes in today for follow-up of their severe sleep apnea, managed with CPAP.     He presented with multiple year history of reported snoring, observed apnea, frequent night-time awakenings, occasional sleep maintenance difficulties, occasional night sweats. Comorbid hypertension, diabetes mellitus.    PSG 7/16/2018 AHI 36.1, events appearing primarily obstructive in nature. Mean Spo2 91%, pernell SpO2 82%, 23.6% of recording was <= 89%. EKG appeared NSR. No PLM's observed. CPAP titrated to zenith of 11 cm H2O with both 9 cm H2O and 11 cm H2O appearing effective, with supine REM observed on 11 cm H2O.    He started autopap 9-15 cmH20 8/21/2018     Overall, he rates the experience with PAP as 7 (0 poor, 10 great). The mask is comfortable.    The mask is not leaking .  He is not snoring with the mask on. He is not having gasp arousals.  He is having significant oral/nasal dryness. The pressure is comfortable.     His PAP interface is Full Face Mask.    Bedtime is typically 2300. He has trouble falling asleep a few times a week. This has been a problem for 'quite a while. He says his 'mind is going places'. He watches TV in bed at times.  Wake time is typically 0630. The patient is usually getting 7.5 hours of sleep per night.    He does feel rested in the morning. He is still awakening 3-4 times a night    Total score - Leckrone: 1 (10/16/2018  8:28 AM)        ResMed   Auto-PAP 9 - 15 cmH2O 30 day usage data:    97% of days with > 4 hours of use. 30/30 days with no use.   Average use 7' 18 minutes per day.   95%ile Leak 2.4 L/min.   CPAP 95% pressure 14.1 cm.   AHI 2.4 events per hour.       Past medical/surgical history, family history, social history, medications and allergies were reviewed.      Problem List:  Patient Active Problem List    Diagnosis Date Noted      "RUBIN (obstructive sleep apnea)- severe (AHI 36) 08/15/2018     Priority: Medium     PSG 7/16/2018 (190#)  AHI 36.1, events appearing primarily obstructive in nature.  Mean Spo2 91%, pernell SpO2 82%, 23.6% of recording was <= 89%.  No PLM's observed.  CPAP titrated to zenith of 11 cm H2O with both 9 cm H2O and 11 cm H2O appearing effective, with supine REM observed on 11 cm H2O       Arnold-Chiari malformation (H) 11/09/2017     Priority: Medium     Overview:   with cervical and thoracic syrinx       Controlled type 2 diabetes mellitus without complication, without long-term current use of insulin (H) 11/09/2017     Priority: Medium     Essential hypertension 11/09/2017     Priority: Medium     Pure hypercholesterolemia 11/09/2017     Priority: Medium     Depression, major 11/06/2013     Priority: Medium     Degeneration of lumbar or lumbosacral intervertebral disc 11/09/2017     Priority: Low     Obesity 11/09/2017     Priority: Low     BMI - 34       Erectile dysfunction, unspecified erectile dysfunction type 10/12/2016     Priority: Low     Microalbuminuria 03/27/2014     Priority: Low        /68  Pulse 63  Resp 12  Ht 5' 6\" (1.676 m)  Wt 200 lb (90.7 kg)  SpO2 98%  BMI 32.28 kg/m2    Impression/Plan:     Severe Sleep apnea.  Tolerating PAP well.   Could consider increasing pressures to 11-16 cmH20     Psychophysiologic insomnia  Read the book Say Good Night To Insomnia    We discussed stimulus control    Dirk Terrell will follow up in about 2 year(s).     Twenty-five minutes spent with patient, all of which were spent face-to-face counseling, consulting, coordinating plan of care.      CC:  ALNDY Grarett    "

## 2018-10-16 NOTE — MR AVS SNAPSHOT
After Visit Summary   10/16/2018    Dirk Terrell    MRN: [id number]           Patient Information     Date Of Birth          1955        Visit Information        Provider Department      10/16/2018 8:30 AM Ahmet Godfrey MD [facility]        Today's Diagnoses     RUBIN (obstructive sleep apnea)    -  1    Psychophysiologic insomnia          Care Instructions    Read the book Say Good Night To Insomnia             Follow-ups after your visit        Follow-up notes from your care team     Return in about 2 years (around 10/16/2020), or if symptoms worsen or fail to improve, for Routine Visit.      Who to contact     If you have questions or need follow up information about today's clinic visit or your schedule please contact [facility] directly at [phone].  Normal or non-critical lab and imaging results will be communicated to you by Trunk Archivehart, letter or phone within 4 business days after the clinic has received the results. If you do not hear from us within 7 days, please contact the clinic through Trunk Archivehart or phone. If you have a critical or abnormal lab result, we will notify you by phone as soon as possible.  Submit refill requests through Kicksend or call your pharmacy and they will forward the refill request to us. Please allow 3 business days for your refill to be completed.          Additional Information About Your Visit        Trunk ArchiveharAmulyte Information     Kicksend gives you secure access to your electronic health record. If you see a primary care provider, you can also send messages to your care team and make appointments. If you have questions, please call your primary care clinic.  If you do not have a primary care provider, please call [phone] and they will assist you.        Care EveryWhere ID     This is your Care EveryWhere ID. This could be used by other organizations to access your [facility] medical records  [id number]        Your Vitals Were     Pulse Respirations Height Pulse  "Oximetry BMI (Body Mass Index)       63 12 5' 6\" (1.676 m) 98% 32.28 kg/m2        Blood Pressure from Last 3 Encounters:   10/16/18 132/68   09/27/18 120/70   08/21/18 130/78    Weight from Last 3 Encounters:   10/16/18 200 lb (90.7 kg)   09/27/18 201 lb (91.2 kg)   08/21/18 190 lb (86.2 kg)              Today, you had the following     No orders found for display       Primary Care Provider Office Phone # Fax #    R Pelon Garrett [phone]       [address] [place] [address]        Equal Access to Services     EMERALD LEVINE : Ariana Barba, bridgette fragoso, liss ortizalmaesther lee, lore harris. So [facility] [phone].    ATENCIÓN: Si habla español, tiene a haskins disposición servicios gratuitos de asistencia lingüística. Llame al [phone].    We comply with applicable federal civil rights laws and [place] laws. We do not discriminate on the basis of race, color, national origin, age, disability, sex, sexual orientation, or gender identity.            Thank you!     Thank you for choosing [facility]  for your care. Our goal is always to provide you with excellent care. Hearing back from our patients is one way we can continue to improve our services. Please take a few minutes to complete the written survey that you may receive in the mail after your visit with us. Thank you!             Your Updated Medication List - Protect others around you: Learn how to safely use, store and throw away your medicines at www.disposemymeds.org.          This list is accurate as of 10/16/18  8:50 AM.  Always use your most recent med list.                   Brand Name Dispense Instructions for use Diagnosis    blood glucose monitoring lancets     100 each    Use to test blood sugar one time daily or as directed.    Controlled type 2 diabetes mellitus without complication, without long-term current use of insulin (H)       BLOOD GLUCOSE TEST STRIPS Strp     " 100 each    Test once daily    Controlled type 2 diabetes mellitus without complication, without long-term current use of insulin (H)       indomethacin 50 MG capsule    INDOCIN    30 capsule    Take 1 capsule (50 mg) by mouth 3 times daily as needed for moderate pain    Acute gouty arthritis       lisinopril 10 MG tablet    PRINIVIL/ZESTRIL    90 tablet    Take 1 tablet (10 mg) by mouth daily    Essential hypertension       lovastatin 10 MG tablet    MEVACOR    90 tablet    Take 2 tablets (20 mg) by mouth At Bedtime    Pure hypercholesterolemia       metFORMIN 1000 MG tablet    GLUCOPHAGE    180 tablet    1000 mg twice a day    Controlled type 2 diabetes mellitus without complication, without long-term current use of insulin (H)       order for DME     1 Device    autoCPAP 9-15 cmh20    RUBIN (obstructive sleep apnea)       sertraline 50 MG tablet    ZOLOFT    60 tablet    Take 2 tablets (100 mg) by mouth daily    Anxiety       sildenafil 100 MG tablet    VIAGRA     Take 100 mg by mouth

## 2019-03-11 DIAGNOSIS — F41.9 ANXIETY: Primary | ICD-10-CM

## 2019-03-11 DIAGNOSIS — E11.9 CONTROLLED TYPE 2 DIABETES MELLITUS WITHOUT COMPLICATION, WITHOUT LONG-TERM CURRENT USE OF INSULIN (H): ICD-10-CM

## 2019-03-12 RX ORDER — SERTRALINE HYDROCHLORIDE 100 MG/1
TABLET, FILM COATED ORAL
Qty: 90 TABLET | Refills: 0 | Status: SHIPPED | OUTPATIENT
Start: 2019-03-12 | End: 2019-08-19

## 2019-04-17 DIAGNOSIS — E78.5 ELEVATED LIPIDS: Primary | ICD-10-CM

## 2019-04-18 RX ORDER — LOVASTATIN 20 MG
TABLET ORAL
Qty: 90 TABLET | Refills: 0 | Status: SHIPPED | OUTPATIENT
Start: 2019-04-18 | End: 2019-09-17

## 2019-04-18 NOTE — TELEPHONE ENCOUNTER
lovastatin (MEVACOR) 20 MG tablet     Last Written Prescription Date:  11/09/2017  Last Fill Quantity: 90,   # refills: 3  Last Office Visit: 09/27/2018  Future Office visit:       Routing refill request to provider for review/approval because:

## 2019-07-01 DIAGNOSIS — E11.9 CONTROLLED TYPE 2 DIABETES MELLITUS WITHOUT COMPLICATION, WITHOUT LONG-TERM CURRENT USE OF INSULIN (H): ICD-10-CM

## 2019-07-01 NOTE — LETTER
July 2, 2019      Dirk Terrell  [address]  [place] [address]        Dear Dirk,       APPOINTMENT REMINDER:   Our records indicates that it is time for you to be seen for office visit, medication review, and lab to be determined.     Your current medication request  will be approved for one refill but you will need to be seen before any additional refills can be approved.  Taking care of your health is important to us, and ongoing visits with your provider are vital to your care.    We look forward to seeing you in the near future.  You may call our office at [phone] to schedule a visit.     Please disregard this notice if you have already made an appointment.    Sincerely,  LANDY Garrett MD

## 2019-09-10 NOTE — PROGRESS NOTES
Subjective     Dirk Terrell is a 64 year old male who presents to clinic today for the following health issues:    HPI   Bumps on the face         Duration: summer    Description (location/character/radiation): left and right side of face    Intensity:  moderate    Accompanying signs and symptoms: no- healing and itches    History (similar episodes/previous evaluation): None    Precipitating or alleviating factors: None    Therapies tried and outcome: None     Diabetes Follow-up      How often are you checking your blood sugar? A few times a month    What time of day are you checking your blood sugars (select all that apply)?  Before meals    Have you had any blood sugars above 200?  No    Have you had any blood sugars below 70?  No    What symptoms do you notice when your blood sugar is low?  None    What concerns do you have today about your diabetes? None     Do you have any of these symptoms? (Select all that apply)  No numbness or tingling in feet.  No redness, sores or blisters on feet.  No complaints of excessive thirst.  No reports of blurry vision.  No significant changes to weight.     Have you had a diabetic eye exam in the last 12 months? No    BP Readings from Last 2 Encounters:   09/12/19 124/64   10/16/18 132/68     Hemoglobin A1C (%)   Date Value   09/27/2018 7.0 (H)   07/03/2018 7.2 (H)     LDL Cholesterol Calculated (mg/dL)   Date Value   09/27/2018 67   07/03/2018     Cannot estimate LDL when triglyceride exceeds 400 mg/dL       Diabetes Management Resources  ABDOMINAL and FLANK   PAIN     Onset: couple months    Description:   Character: Dull ache and Gnawing  Location: left lower quadrant left flank  Radiation: Back    Intensity: moderate    Progression of Symptoms:  same and intermittent    Accompanying Signs & Symptoms:  Fever/Chills?: no   Gas/Bloating: no   Nausea: no   Vomitting: no   Diarrhea?: YES  Constipation:no   Dysuria or Hematuria: no    History:   Trauma: no   Previous similar  pain: no    Previous tests done: none    Precipitating factors:   Does the pain change with:     Food: no      BM: no     Urination: no     Alleviating factors:  none    Therapies Tried and outcome: none    LMP:  not applicable      Sugars have been controlled.  He does note over the last several months some intermittent pain in the left abdomen in the axillary line there has been no rash, he has no change in his stool pattern, is up-to-date with colonoscopy( had a negative one like 2 years ago).  Denies any hematuria, fever, nausea, vomiting.  Pain is actually feeling pretty good today.      PAST MEDICAL HISTORY:  Past Medical History:   Diagnosis Date     Gout 2014    history of gout        PAST SURGICAL HISTORY:  Past Surgical History:   Procedure Laterality Date     COLONOSCOPY N/A 11/01/2016    one polyp was told to repeat in 5 years     CYSTOSCOPY,+URETEROSCOPY  2002    stent     ORTHOPEDIC SURGERY  2013    Left rotator cuff surgery     ORTHOPEDIC SURGERY  2011    left rotator cuff surgery     TONSILLECTOMY  1963       MEDICATIONS:  Prior to Admission medications    Medication Sig Start Date End Date Taking? Authorizing Provider   blood glucose monitoring (SHAI MICROLET) lancets Use to test blood sugar one time daily or as directed. 7/3/18  Yes LANDY Garrett MD   Glucose Blood (BLOOD GLUCOSE TEST STRIPS) STRP Test once daily 7/3/18  Yes LANDY Garrett MD   indomethacin (INDOCIN) 50 MG capsule Take 1 capsule (50 mg) by mouth 3 times daily as needed for moderate pain 9/27/18  Yes LANDY Garrett MD   lisinopril (PRINIVIL/ZESTRIL) 10 MG tablet TAKE ONE TABLET BY MOUTH ONCE DAILY 12/4/18  Yes LANDY Garrett MD   lovastatin (MEVACOR) 20 MG tablet TAKE 1 TABLET BY MOUTH AT BEDTIME 4/18/19  Yes LANDY Garrett MD   metFORMIN (GLUCOPHAGE) 1000 MG tablet TAKE 1 TABLET BY MOUTH TWICE DAILY WITH MEALS 7/2/19  Yes LANDY Garrett MD   order for DME autoCPAP 9-15 cmh20 8/21/18  Yes Ahmet Godfrey MD   sertraline  "(ZOLOFT) 100 MG tablet TAKE 1 TABLET BY MOUTH ONCE DAILY 8/21/19  Yes LANDY Garrett MD   sildenafil (VIAGRA) 100 MG tablet Take 100 mg by mouth 10/12/16  Yes Reported, Patient       ALLERGIES:   No Known Allergies    ROS:  Constitutional, HEENT, cardiovascular, pulmonary, gi and gu systems are negative, except as otherwise noted.      EXAM:  /64   Pulse 63   Temp 97.6  F (36.4  C)   Ht 1.676 m (5' 6\")   Wt 89.4 kg (197 lb)   SpO2 98%   BMI 31.80 kg/m   Body mass index is 31.8 kg/m .   GENERAL APPEARANCE: healthy, alert and no distress, he has some ecchymosis inferior to the left eye and some ecchymosis left anterior scalp from a fall a week ago  NECK: no adenopathy, no asymmetry, masses, or scars and thyroid normal to palpation  RESP: lungs clear to auscultation - no rales, rhonchi or wheezes  CV: regular rates and rhythm, normal S1 S2, no S3 or S4 and no murmur, click or rub  ABDOMEN: soft, nontender, without hepatosplenomegaly or masses and bowel sounds normal  SKIN: He has an actinic keratosis right temple and also one on the right jawline.  On the left side has a sore that is not healed over the last 3 to 4 months despite treating with topical antibiotics and dressings.  Lab/ X-ray  No results found for this or any previous visit (from the past 24 hour(s)).    ASSESSMENT/PLAN:    ICD-10-CM    1. Controlled type 2 diabetes mellitus without complication, without long-term current use of insulin (H) E11.9 Hemoglobin A1c     Lipid Profile     Albumin Random Urine Quantitative with Creat Ratio     Comprehensive metabolic panel   2. Essential hypertension I10 Albumin Random Urine Quantitative with Creat Ratio     Comprehensive metabolic panel   3. Elevated lipids E78.5 Lipid Profile     Comprehensive metabolic panel   4. Face lesion L98.9 GENERAL SURG ADULT REFERRAL   5. S/P cryotherapy of skin lesion Z98.890      For the 2 actinic keratosis right side of his face treated with cryotherapy x2 may need a " follow-up treatment in 6 weeks.  On the left side of his face has a non-healing sore we will have him see general surgery for excision.  History of elevated lipids we will get a lipid profile and for the hypertension check a CMP.  For his diabetes get an A1c lipid urine albumin and CMP he had noted intermittently some discomfort in the left side of his abdomen is up-to-date with colonoscopy is exam was benign today offered we could do a CT of his abdomen he wants to wait if he develops vomiting fever persistent pain or other acute changes such as hematuria he will let us know but it is actually feeling pretty good today is probably a abdominal muscle strain    JOSEPH Garrett MD  September 12, 2019

## 2019-09-12 ENCOUNTER — OFFICE VISIT (OUTPATIENT)
Dept: FAMILY MEDICINE | Facility: OTHER | Age: 64
End: 2019-09-12
Attending: FAMILY MEDICINE
Payer: COMMERCIAL

## 2019-09-12 VITALS
SYSTOLIC BLOOD PRESSURE: 124 MMHG | WEIGHT: 197 LBS | DIASTOLIC BLOOD PRESSURE: 64 MMHG | HEIGHT: 66 IN | HEART RATE: 63 BPM | TEMPERATURE: 97.6 F | OXYGEN SATURATION: 98 % | BODY MASS INDEX: 31.66 KG/M2

## 2019-09-12 DIAGNOSIS — E78.5 ELEVATED LIPIDS: ICD-10-CM

## 2019-09-12 DIAGNOSIS — L98.9 FACE LESION: ICD-10-CM

## 2019-09-12 DIAGNOSIS — E11.9 CONTROLLED TYPE 2 DIABETES MELLITUS WITHOUT COMPLICATION, WITHOUT LONG-TERM CURRENT USE OF INSULIN (H): Primary | Chronic | ICD-10-CM

## 2019-09-12 DIAGNOSIS — Z98.890 S/P CRYOTHERAPY OF SKIN LESION: ICD-10-CM

## 2019-09-12 DIAGNOSIS — I10 ESSENTIAL HYPERTENSION: ICD-10-CM

## 2019-09-12 LAB
ALBUMIN SERPL-MCNC: 4.4 G/DL (ref 3.4–5)
ALP SERPL-CCNC: 54 U/L (ref 40–150)
ALT SERPL W P-5'-P-CCNC: 42 U/L (ref 0–70)
ANION GAP SERPL CALCULATED.3IONS-SCNC: 8 MMOL/L (ref 3–14)
AST SERPL W P-5'-P-CCNC: 36 U/L (ref 0–45)
BILIRUB SERPL-MCNC: 1.1 MG/DL (ref 0.2–1.3)
BUN SERPL-MCNC: 21 MG/DL (ref 7–30)
CALCIUM SERPL-MCNC: 9.4 MG/DL (ref 8.5–10.1)
CHLORIDE SERPL-SCNC: 101 MMOL/L (ref 94–109)
CHOLEST SERPL-MCNC: 181 MG/DL
CO2 SERPL-SCNC: 26 MMOL/L (ref 20–32)
CREAT SERPL-MCNC: 0.9 MG/DL (ref 0.66–1.25)
CREAT UR-MCNC: 56 MG/DL
EST. AVERAGE GLUCOSE BLD GHB EST-MCNC: 146 MG/DL
GFR SERPL CREATININE-BSD FRML MDRD: >90 ML/MIN/{1.73_M2}
GLUCOSE SERPL-MCNC: 127 MG/DL (ref 70–99)
HBA1C MFR BLD: 6.7 % (ref 0–5.6)
HDLC SERPL-MCNC: 52 MG/DL
LDLC SERPL CALC-MCNC: 87 MG/DL
MICROALBUMIN UR-MCNC: 7 MG/L
MICROALBUMIN/CREAT UR: 13.09 MG/G CR (ref 0–17)
NONHDLC SERPL-MCNC: 129 MG/DL
POTASSIUM SERPL-SCNC: 4.2 MMOL/L (ref 3.4–5.3)
PROT SERPL-MCNC: 8.1 G/DL (ref 6.8–8.8)
SODIUM SERPL-SCNC: 135 MMOL/L (ref 133–144)
TRIGL SERPL-MCNC: 210 MG/DL

## 2019-09-12 PROCEDURE — 80061 LIPID PANEL: CPT | Performed by: FAMILY MEDICINE

## 2019-09-12 PROCEDURE — 36415 COLL VENOUS BLD VENIPUNCTURE: CPT | Performed by: FAMILY MEDICINE

## 2019-09-12 PROCEDURE — 80053 COMPREHEN METABOLIC PANEL: CPT | Performed by: FAMILY MEDICINE

## 2019-09-12 PROCEDURE — 40000788 ZZHCL STATISTIC ESTIMATED AVERAGE GLUCOSE: Performed by: FAMILY MEDICINE

## 2019-09-12 PROCEDURE — 99214 OFFICE O/P EST MOD 30 MIN: CPT | Performed by: FAMILY MEDICINE

## 2019-09-12 PROCEDURE — 83036 HEMOGLOBIN GLYCOSYLATED A1C: CPT | Performed by: FAMILY MEDICINE

## 2019-09-12 PROCEDURE — 82043 UR ALBUMIN QUANTITATIVE: CPT | Performed by: FAMILY MEDICINE

## 2019-09-12 ASSESSMENT — PAIN SCALES - GENERAL: PAINLEVEL: NO PAIN (0)

## 2019-09-12 ASSESSMENT — MIFFLIN-ST. JEOR: SCORE: 1631.34

## 2019-09-12 NOTE — NURSING NOTE
"Chief Complaint   Patient presents with     Diabetes       Initial /64   Pulse 63   Temp 97.6  F (36.4  C)   Ht 1.676 m (5' 6\")   Wt 89.4 kg (197 lb)   SpO2 98%   BMI 31.80 kg/m   Estimated body mass index is 31.8 kg/m  as calculated from the following:    Height as of this encounter: 1.676 m (5' 6\").    Weight as of this encounter: 89.4 kg (197 lb).  Medication Reconciliation: complete  "

## 2019-09-17 DIAGNOSIS — E78.5 ELEVATED LIPIDS: ICD-10-CM

## 2019-09-17 DIAGNOSIS — F41.9 ANXIETY: ICD-10-CM

## 2019-09-17 RX ORDER — LOVASTATIN 20 MG
TABLET ORAL
Qty: 90 TABLET | Refills: 0 | Status: SHIPPED | OUTPATIENT
Start: 2019-09-17 | End: 2020-01-13

## 2019-09-17 RX ORDER — SERTRALINE HYDROCHLORIDE 100 MG/1
TABLET, FILM COATED ORAL
Qty: 30 TABLET | Refills: 0 | Status: SHIPPED | OUTPATIENT
Start: 2019-09-17 | End: 2019-10-14

## 2019-10-14 DIAGNOSIS — F41.9 ANXIETY: ICD-10-CM

## 2019-10-14 DIAGNOSIS — E11.9 CONTROLLED TYPE 2 DIABETES MELLITUS WITHOUT COMPLICATION, WITHOUT LONG-TERM CURRENT USE OF INSULIN (H): ICD-10-CM

## 2019-10-16 RX ORDER — SERTRALINE HYDROCHLORIDE 100 MG/1
TABLET, FILM COATED ORAL
Qty: 30 TABLET | Refills: 1 | Status: SHIPPED | OUTPATIENT
Start: 2019-10-16 | End: 2020-01-13

## 2019-11-26 DIAGNOSIS — I10 ESSENTIAL HYPERTENSION: ICD-10-CM

## 2019-11-29 RX ORDER — LISINOPRIL 10 MG/1
TABLET ORAL
Qty: 90 TABLET | Refills: 0 | Status: SHIPPED | OUTPATIENT
Start: 2019-11-29 | End: 2020-03-19

## 2020-01-10 DIAGNOSIS — F41.9 ANXIETY: ICD-10-CM

## 2020-01-10 DIAGNOSIS — E78.5 ELEVATED LIPIDS: ICD-10-CM

## 2020-01-13 RX ORDER — SERTRALINE HYDROCHLORIDE 100 MG/1
TABLET, FILM COATED ORAL
Qty: 30 TABLET | Refills: 0 | Status: SHIPPED | OUTPATIENT
Start: 2020-01-13 | End: 2020-02-24

## 2020-01-13 RX ORDER — LOVASTATIN 20 MG
TABLET ORAL
Qty: 90 TABLET | Refills: 0 | Status: SHIPPED | OUTPATIENT
Start: 2020-01-13 | End: 2020-06-23

## 2020-01-13 NOTE — TELEPHONE ENCOUNTER
sertraline (ZOLOFT) 100 MG tablet    Last Written Prescription Date:  10/16/2019  Last Fill Quantity: 30,   # refills: 1      lovastatin (MEVACOR) 20 MG tablet    Last Written Prescription Date:  09/17/2019  Last Fill Quantity: 90,   # refills: 0  Last Office Visit: 09/12/2019  Future Office visit:       Routing refill request to provider for review/approval because:

## 2020-02-21 DIAGNOSIS — F41.9 ANXIETY: ICD-10-CM

## 2020-02-21 NOTE — TELEPHONE ENCOUNTER
Sertraline 100 MG       Last Written Prescription Date:  1-  Last Fill Quantity: 30,   # refills: 0  Last Office Visit: 9-12-19  Future Office visit:       Routing refill request to provider for review/approval because:

## 2020-02-24 RX ORDER — SERTRALINE HYDROCHLORIDE 100 MG/1
TABLET, FILM COATED ORAL
Qty: 30 TABLET | Refills: 0 | Status: SHIPPED | OUTPATIENT
Start: 2020-02-24 | End: 2020-03-30

## 2020-03-02 ENCOUNTER — HEALTH MAINTENANCE LETTER (OUTPATIENT)
Age: 65
End: 2020-03-02

## 2020-03-11 ENCOUNTER — TRANSFERRED RECORDS (OUTPATIENT)
Dept: HEALTH INFORMATION MANAGEMENT | Facility: CLINIC | Age: 65
End: 2020-03-11

## 2020-03-11 LAB — RETINOPATHY: NEGATIVE

## 2020-03-12 ENCOUNTER — TELEPHONE (OUTPATIENT)
Dept: FAMILY MEDICINE | Facility: OTHER | Age: 65
End: 2020-03-12

## 2020-03-17 DIAGNOSIS — I10 ESSENTIAL HYPERTENSION: ICD-10-CM

## 2020-03-17 DIAGNOSIS — E11.9 CONTROLLED TYPE 2 DIABETES MELLITUS WITHOUT COMPLICATION, WITHOUT LONG-TERM CURRENT USE OF INSULIN (H): ICD-10-CM

## 2020-03-19 RX ORDER — LISINOPRIL 10 MG/1
TABLET ORAL
Qty: 90 TABLET | Refills: 0 | Status: SHIPPED | OUTPATIENT
Start: 2020-03-19 | End: 2020-06-23

## 2020-05-14 DIAGNOSIS — F41.9 ANXIETY: ICD-10-CM

## 2020-05-14 RX ORDER — SERTRALINE HYDROCHLORIDE 100 MG/1
TABLET, FILM COATED ORAL
Qty: 30 TABLET | Refills: 0 | Status: SHIPPED | OUTPATIENT
Start: 2020-05-14 | End: 2020-06-23

## 2020-06-22 DIAGNOSIS — F41.9 ANXIETY: ICD-10-CM

## 2020-06-22 DIAGNOSIS — I10 ESSENTIAL HYPERTENSION: ICD-10-CM

## 2020-06-22 DIAGNOSIS — E78.5 ELEVATED LIPIDS: ICD-10-CM

## 2020-06-22 NOTE — TELEPHONE ENCOUNTER
lisinopril      Last Written Prescription Date:  3.19.2020  Last Fill Quantity: 90,   # refills: 0  Last Office Visit: 9.12.19    mevacor      Last Written Prescription Date:  1.13.2020  Last Fill Quantity: 90,   # refills: 0  Last Office Visit: 9.12.19    zoloft      Last Written Prescription Date:  5.14.2020  Last Fill Quantity: 30,   # refills: 0  Last Office Visit: 9.12.19

## 2020-06-23 RX ORDER — LOVASTATIN 20 MG
TABLET ORAL
Qty: 90 TABLET | Refills: 0 | Status: SHIPPED | OUTPATIENT
Start: 2020-06-23 | End: 2020-11-12

## 2020-06-23 RX ORDER — LISINOPRIL 10 MG/1
TABLET ORAL
Qty: 90 TABLET | Refills: 0 | Status: SHIPPED | OUTPATIENT
Start: 2020-06-23 | End: 2020-11-12

## 2020-06-23 RX ORDER — SERTRALINE HYDROCHLORIDE 100 MG/1
TABLET, FILM COATED ORAL
Qty: 30 TABLET | Refills: 0 | Status: SHIPPED | OUTPATIENT
Start: 2020-06-23 | End: 2020-08-04

## 2020-07-31 DIAGNOSIS — F41.9 ANXIETY: ICD-10-CM

## 2020-07-31 DIAGNOSIS — E11.9 CONTROLLED TYPE 2 DIABETES MELLITUS WITHOUT COMPLICATION, WITHOUT LONG-TERM CURRENT USE OF INSULIN (H): ICD-10-CM

## 2020-08-03 NOTE — TELEPHONE ENCOUNTER
sertraline (ZOLOFT) 100 MG tablet      Last Written Prescription Date:  6/23/2020  Last Fill Quantity: 30,   # refills: 0  Last Office Visit: 9/12/2020  Future Office visit:       metFORMIN (GLUCOPHAGE) 1000 MG tablet      Last Written Prescription Date:  3/19/2020  Last Fill Quantity: 180,   # refills: 0  Last Office Visit: 9/12/2020  Future Office visit:

## 2020-08-04 RX ORDER — SERTRALINE HYDROCHLORIDE 100 MG/1
TABLET, FILM COATED ORAL
Qty: 30 TABLET | Refills: 0 | Status: SHIPPED | OUTPATIENT
Start: 2020-08-04 | End: 2020-09-17

## 2020-09-17 DIAGNOSIS — F41.9 ANXIETY: ICD-10-CM

## 2020-09-17 RX ORDER — SERTRALINE HYDROCHLORIDE 100 MG/1
TABLET, FILM COATED ORAL
Qty: 30 TABLET | Refills: 0 | Status: SHIPPED | OUTPATIENT
Start: 2020-09-17 | End: 2020-11-12

## 2020-09-17 NOTE — TELEPHONE ENCOUNTER
ZOLOFT      Last Written Prescription Date:  8-4-2020  Last Fill Quantity: 30,   # refills: 0  Last Office Visit: 9-  Future Office visit:       Routing refill request to provider for review/approval because:  Medication is reported/historical

## 2020-11-11 DIAGNOSIS — E78.5 ELEVATED LIPIDS: ICD-10-CM

## 2020-11-11 DIAGNOSIS — I10 ESSENTIAL HYPERTENSION: ICD-10-CM

## 2020-11-11 DIAGNOSIS — F41.9 ANXIETY: ICD-10-CM

## 2020-11-12 RX ORDER — SERTRALINE HYDROCHLORIDE 100 MG/1
TABLET, FILM COATED ORAL
Qty: 30 TABLET | Refills: 0 | Status: SHIPPED | OUTPATIENT
Start: 2020-11-12 | End: 2021-01-05

## 2020-11-12 RX ORDER — LISINOPRIL 10 MG/1
TABLET ORAL
Qty: 90 TABLET | Refills: 0 | Status: SHIPPED | OUTPATIENT
Start: 2020-11-12 | End: 2021-03-23

## 2020-11-12 RX ORDER — LOVASTATIN 20 MG
TABLET ORAL
Qty: 90 TABLET | Refills: 0 | Status: SHIPPED | OUTPATIENT
Start: 2020-11-12 | End: 2021-06-03

## 2020-11-12 NOTE — TELEPHONE ENCOUNTER
lisnopril      Last Written Prescription Date: 6/23/20   Last Fill Quantity: 90,   # refills: 0  Last Office Visit: 9/12/19  Future Office visit:       Lovastatin 6/23/20 #90  Sertraline 9/17/20 #30

## 2020-12-20 ENCOUNTER — HEALTH MAINTENANCE LETTER (OUTPATIENT)
Age: 65
End: 2020-12-20

## 2021-01-05 DIAGNOSIS — F41.9 ANXIETY: ICD-10-CM

## 2021-01-05 RX ORDER — SERTRALINE HYDROCHLORIDE 100 MG/1
TABLET, FILM COATED ORAL
Qty: 30 TABLET | Refills: 0 | Status: SHIPPED | OUTPATIENT
Start: 2021-01-05 | End: 2021-03-23

## 2021-01-05 NOTE — TELEPHONE ENCOUNTER
sertraline (ZOLOFT) 100 MG tablet      Last Written Prescription Date:  11/12/20  Last Fill Quantity: 30,   # refills: 0  Last Office Visit: 9/12/19  Future Office visit:       Routing refill request to provider for review/approval

## 2021-02-11 DIAGNOSIS — E11.9 CONTROLLED TYPE 2 DIABETES MELLITUS WITHOUT COMPLICATION, WITHOUT LONG-TERM CURRENT USE OF INSULIN (H): ICD-10-CM

## 2021-02-11 NOTE — TELEPHONE ENCOUNTER
Metformin 1000mg      Last Written Prescription Date:  8/4/2020  Last Fill Quantity: 180,   # refills: 0  Last Office Visit: 9/12/2019  Future Office visit:       Routing refill request to provider for review/approval because:

## 2021-03-10 ENCOUNTER — IMMUNIZATION (OUTPATIENT)
Dept: FAMILY MEDICINE | Facility: OTHER | Age: 66
End: 2021-03-10
Attending: FAMILY MEDICINE
Payer: COMMERCIAL

## 2021-03-10 PROCEDURE — 91300 PR COVID VAC PFIZER DIL RECON 30 MCG/0.3 ML IM: CPT

## 2021-03-10 PROCEDURE — 0001A PR COVID VAC PFIZER DIL RECON 30 MCG/0.3 ML IM: CPT

## 2021-03-22 DIAGNOSIS — F41.9 ANXIETY: ICD-10-CM

## 2021-03-22 DIAGNOSIS — I10 ESSENTIAL HYPERTENSION: ICD-10-CM

## 2021-03-22 NOTE — TELEPHONE ENCOUNTER
Lisinopril  Last Written Prescription Date: 11/12/20  Last Fill Quantity: 90 # of Refills: 0  Last Office Visit: 9/12/19    Zoloft  Last Written Prescription Date: 1/5/21  Last Fill Quantity: 30 # of Refills: 0  Last Office Visit: 9/12/19

## 2021-03-23 RX ORDER — LISINOPRIL 10 MG/1
TABLET ORAL
Qty: 90 TABLET | Refills: 0 | Status: SHIPPED | OUTPATIENT
Start: 2021-03-23 | End: 2021-06-03

## 2021-03-23 RX ORDER — SERTRALINE HYDROCHLORIDE 100 MG/1
TABLET, FILM COATED ORAL
Qty: 30 TABLET | Refills: 0 | Status: SHIPPED | OUTPATIENT
Start: 2021-03-23 | End: 2021-06-03

## 2021-03-29 ENCOUNTER — IMMUNIZATION (OUTPATIENT)
Dept: FAMILY MEDICINE | Facility: OTHER | Age: 66
End: 2021-03-29
Attending: FAMILY MEDICINE
Payer: COMMERCIAL

## 2021-03-29 PROCEDURE — 0002A PR COVID VAC PFIZER DIL RECON 30 MCG/0.3 ML IM: CPT

## 2021-03-29 PROCEDURE — 91300 PR COVID VAC PFIZER DIL RECON 30 MCG/0.3 ML IM: CPT

## 2021-04-18 ENCOUNTER — HEALTH MAINTENANCE LETTER (OUTPATIENT)
Age: 66
End: 2021-04-18

## 2021-05-18 NOTE — PROGRESS NOTES
Assessment & Plan     Controlled type 2 diabetes mellitus without complication, without long-term current use of insulin (H)    - Hemoglobin A1c  - Comprehensive metabolic panel (BMP + Alb, Alk Phos, ALT, AST, Total. Bili, TP)  Sugars have been in the 100s we will check an A1c and a CMP today.  He states in the past had an issue with Arnold-Chiari malformation and some syndrome where he had pockets of extra fluid in his spinal column and it has affected his sensation on the left side of his body.  He does have an abrasion on his left arm near the left elbow but there is no sign of infection with that.  Facial skin lesion  Has 2 sores on his face that are not healing we will have him see dermatology actually Dr. Cheung is here today so he will see him.    Essential hypertension  Blood pressures controlled    Elevated lipids    - Lipid Profile  We will check lipids today  Screening for prostate cancer    - PSA, screen.  Check PSA since he is getting other blood test today                     LANDY Garrett MD  [facility] - [place] Cavazos is a 65 year old who presents for the following health issues     HPI     Diabetes Follow-up    How often are you checking your blood sugar? A few times a week  What time of day are you checking your blood sugars (select all that apply)?  Before and after meals  Have you had any blood sugars above 200?  No  Have you had any blood sugars below 70?  No    What symptoms do you notice when your blood sugar is low?  None and Not applicable    What concerns do you have today about your diabetes? None     Do you have any of these symptoms? (Select all that apply)  No numbness or tingling in feet.  No redness, sores or blisters on feet.  No complaints of excessive thirst.  No reports of blurry vision.  No significant changes to weight.    Have you had a diabetic eye exam in the last 12 months? No    1. foot deformity   No  2. Current or previous foot ulceration      No  3. Current or previous pre-ulcerative calluses     No  4. previous partial amputation of one or both feet or complete amputation of one foot     No  5. peripheral neuropathy with evidence of callus formation     No  6. poor circulation     No          Hyperlipidemia Follow-Up      Are you regularly taking any medication or supplement to lower your cholesterol?   Yes- lovastatin    Are you having muscle aches or other side effects that you think could be caused by your cholesterol lowering medication?  No    Hypertension Follow-up      Do you check your blood pressure regularly outside of the clinic? No     Are you following a low salt diet? Yes    Are your blood pressures ever more than 140 on the top number (systolic) OR more   than 90 on the bottom number (diastolic), for example 140/90? n/a    BP Readings from Last 2 Encounters:   06/01/21 124/70   09/12/19 124/64     Hemoglobin A1C (%)   Date Value   09/12/2019 6.7 (H)   09/27/2018 7.0 (H)     LDL Cholesterol Calculated (mg/dL)   Date Value   09/12/2019 87   09/27/2018 67         How many servings of fruits and vegetables do you eat daily?  0-1    On average, how many sweetened beverages do you drink each day (Examples: soda, juice, sweet tea, etc.  Do NOT count diet or artificially sweetened beverages)?   0    How many days per week do you exercise enough to make your heart beat faster? 7    How many minutes a day do you exercise enough to make your heart beat faster? 30 - 60    How many days per week do you miss taking your medication? 0        Review of Systems   Constitutional, HEENT, cardiovascular, pulmonary, gi and gu systems are negative, except as otherwise noted.      Objective    /70   Pulse 60   Temp 97.4  F (36.3  C)   Resp 18   Wt 91.3 kg (201 lb 3.2 oz)   SpO2 98%   BMI 32.47 kg/m    Body mass index is 32.47 kg/m .  Physical Exam   GENERAL: healthy, alert and no distress  NECK: no adenopathy, no asymmetry, masses, or scars and  thyroid normal to palpation  RESP: lungs clear to auscultation - no rales, rhonchi or wheezes  CV: regular rate and rhythm, normal S1 S2, no S3 or S4, no murmur, click or rub, no peripheral edema and peripheral pulses strong  ABDOMEN: soft, nontender, no hepatosplenomegaly, no masses and bowel sounds normal  MS: no gross musculoskeletal defects noted, no edema  SKIN: He has a small very shallow ulcer millimeter to in diameter states that it healed up earlier with some antibiotic ointment but now it has come back.  On the right face has a couple millimeters in diameter raised lesion.  Diabetic foot exam: normal DP and PT pulses, no trophic changes or ulcerative lesions and normal sensory exam

## 2021-06-01 ENCOUNTER — OFFICE VISIT (OUTPATIENT)
Dept: DERMATOLOGY | Facility: OTHER | Age: 66
End: 2021-06-01
Attending: FAMILY MEDICINE
Payer: COMMERCIAL

## 2021-06-01 ENCOUNTER — OFFICE VISIT (OUTPATIENT)
Dept: FAMILY MEDICINE | Facility: OTHER | Age: 66
End: 2021-06-01
Attending: FAMILY MEDICINE
Payer: COMMERCIAL

## 2021-06-01 VITALS
DIASTOLIC BLOOD PRESSURE: 70 MMHG | SYSTOLIC BLOOD PRESSURE: 120 MMHG | RESPIRATION RATE: 18 BRPM | TEMPERATURE: 97.4 F | OXYGEN SATURATION: 98 % | HEART RATE: 60 BPM

## 2021-06-01 VITALS
HEART RATE: 60 BPM | SYSTOLIC BLOOD PRESSURE: 124 MMHG | TEMPERATURE: 97.4 F | DIASTOLIC BLOOD PRESSURE: 70 MMHG | RESPIRATION RATE: 18 BRPM | BODY MASS INDEX: 32.47 KG/M2 | OXYGEN SATURATION: 98 % | WEIGHT: 201.2 LBS

## 2021-06-01 DIAGNOSIS — E11.9 CONTROLLED TYPE 2 DIABETES MELLITUS WITHOUT COMPLICATION, WITHOUT LONG-TERM CURRENT USE OF INSULIN (H): Primary | ICD-10-CM

## 2021-06-01 DIAGNOSIS — I10 ESSENTIAL HYPERTENSION: ICD-10-CM

## 2021-06-01 DIAGNOSIS — D48.5 NEOPLASM OF UNCERTAIN BEHAVIOR OF SKIN: ICD-10-CM

## 2021-06-01 DIAGNOSIS — L98.9 SKIN LESION: Primary | ICD-10-CM

## 2021-06-01 DIAGNOSIS — L98.9 FACIAL SKIN LESION: ICD-10-CM

## 2021-06-01 DIAGNOSIS — E78.5 ELEVATED LIPIDS: ICD-10-CM

## 2021-06-01 DIAGNOSIS — Z12.5 SCREENING FOR PROSTATE CANCER: ICD-10-CM

## 2021-06-01 LAB
ALBUMIN SERPL-MCNC: 4.1 G/DL (ref 3.4–5)
ALP SERPL-CCNC: 54 U/L (ref 40–150)
ALT SERPL W P-5'-P-CCNC: 32 U/L (ref 0–70)
ANION GAP SERPL CALCULATED.3IONS-SCNC: 4 MMOL/L (ref 3–14)
AST SERPL W P-5'-P-CCNC: 28 U/L (ref 0–45)
BILIRUB SERPL-MCNC: 1 MG/DL (ref 0.2–1.3)
BUN SERPL-MCNC: 16 MG/DL (ref 7–30)
CALCIUM SERPL-MCNC: 9.4 MG/DL (ref 8.5–10.1)
CHLORIDE SERPL-SCNC: 104 MMOL/L (ref 94–109)
CHOLEST SERPL-MCNC: 157 MG/DL
CO2 SERPL-SCNC: 30 MMOL/L (ref 20–32)
CREAT SERPL-MCNC: 0.97 MG/DL (ref 0.66–1.25)
EST. AVERAGE GLUCOSE BLD GHB EST-MCNC: 146 MG/DL
GFR SERPL CREATININE-BSD FRML MDRD: 81 ML/MIN/{1.73_M2}
GLUCOSE SERPL-MCNC: 136 MG/DL (ref 70–99)
HBA1C MFR BLD: 6.7 % (ref 0–5.6)
HDLC SERPL-MCNC: 52 MG/DL
LDLC SERPL CALC-MCNC: 78 MG/DL
NONHDLC SERPL-MCNC: 105 MG/DL
POTASSIUM SERPL-SCNC: 4 MMOL/L (ref 3.4–5.3)
PROT SERPL-MCNC: 7.2 G/DL (ref 6.8–8.8)
PSA SERPL-ACNC: 0.87 UG/L (ref 0–4)
SODIUM SERPL-SCNC: 138 MMOL/L (ref 133–144)
TRIGL SERPL-MCNC: 135 MG/DL

## 2021-06-01 PROCEDURE — 88305 TISSUE EXAM BY PATHOLOGIST: CPT | Mod: TC | Performed by: DERMATOLOGY

## 2021-06-01 PROCEDURE — 99214 OFFICE O/P EST MOD 30 MIN: CPT | Performed by: FAMILY MEDICINE

## 2021-06-01 PROCEDURE — 99202 OFFICE O/P NEW SF 15 MIN: CPT | Mod: 25 | Performed by: DERMATOLOGY

## 2021-06-01 PROCEDURE — 11102 TANGNTL BX SKIN SINGLE LES: CPT | Performed by: DERMATOLOGY

## 2021-06-01 PROCEDURE — 36415 COLL VENOUS BLD VENIPUNCTURE: CPT | Performed by: FAMILY MEDICINE

## 2021-06-01 PROCEDURE — 80053 COMPREHEN METABOLIC PANEL: CPT | Performed by: FAMILY MEDICINE

## 2021-06-01 PROCEDURE — G0103 PSA SCREENING: HCPCS | Performed by: FAMILY MEDICINE

## 2021-06-01 PROCEDURE — 80061 LIPID PANEL: CPT | Performed by: FAMILY MEDICINE

## 2021-06-01 PROCEDURE — 99N1182 PR STATISTIC ESTIMATED AVERAGE GLUCOSE: Performed by: FAMILY MEDICINE

## 2021-06-01 PROCEDURE — 83036 HEMOGLOBIN GLYCOSYLATED A1C: CPT | Performed by: FAMILY MEDICINE

## 2021-06-01 ASSESSMENT — PATIENT HEALTH QUESTIONNAIRE - PHQ9: SUM OF ALL RESPONSES TO PHQ QUESTIONS 1-9: 3

## 2021-06-01 ASSESSMENT — ANXIETY QUESTIONNAIRES
7. FEELING AFRAID AS IF SOMETHING AWFUL MIGHT HAPPEN: NOT AT ALL
6. BECOMING EASILY ANNOYED OR IRRITABLE: NOT AT ALL
5. BEING SO RESTLESS THAT IT IS HARD TO SIT STILL: NOT AT ALL
GAD7 TOTAL SCORE: 0
1. FEELING NERVOUS, ANXIOUS, OR ON EDGE: NOT AT ALL
3. WORRYING TOO MUCH ABOUT DIFFERENT THINGS: NOT AT ALL
4. TROUBLE RELAXING: NOT AT ALL
2. NOT BEING ABLE TO STOP OR CONTROL WORRYING: NOT AT ALL

## 2021-06-01 ASSESSMENT — PAIN SCALES - GENERAL
PAINLEVEL: MODERATE PAIN (4)
PAINLEVEL: MODERATE PAIN (4)

## 2021-06-01 NOTE — LETTER
My Depression Action Plan  Name: Dirk Terrell   Date of Birth 1955  Date: 5/18/2021    My doctor: LANDY Garrett   My clinic: [facility] [place]  [address] [place] [address]  [phone]          GREEN    ZONE   Good Control    What it looks like:     Things are going generally well. You have normal ups and downs. You may even feel depressed from time to time, but bad moods usually last less than a day.   What you need to do:  1. Continue to care for yourself (see self care plan)  2. Check your depression survival kit and update it as needed  3. Follow your physician s recommendations including any medication.  4. Do not stop taking medication unless you consult with your physician first.           YELLOW         ZONE Getting Worse    What it looks like:     Depression is starting to interfere with your life.     It may be hard to get out of bed; you may be starting to isolate yourself from others.    Symptoms of depression are starting to last most all day and this has happened for several days.     You may have suicidal thoughts but they are not constant.   What you need to do:     1. Call your care team. Your response to treatment will improve if you keep your care team informed of your progress. Yellow periods are signs an adjustment may need to be made.     2. Continue your self-care.  Just get dressed and ready for the day.  Don't give yourself time to talk yourself out of it.    3. Talk to someone in your support network.    4. Open up your Depression Self-Care Plan/Wellness Kit.           RED    ZONE Medical Alert - Get Help    What it looks like:     Depression is seriously interfering with your life.     You may experience these or other symptoms: You can t get out of bed most days, can t work or engage in other necessary activities, you have trouble taking care of basic hygiene, or basic responsibilities, thoughts of suicide or death that will not go away,  self-injurious behavior.     What you need to do:  1. Call your care team and request a same-day appointment. If they are not available (weekends or after hours) call your local crisis line, emergency room or 911.          Depression Self-Care Plan / Wellness Kit    Many people find that medication and therapy are helpful treatments for managing depression. In addition, making small changes to your everyday life can help to boost your mood and improve your wellbeing. Below are some tips for you to consider. Be sure to talk with your medical provider and/or behavioral health consultant if your symptoms are worsening or not improving.     Sleep   Sleep hygiene  means all of the habits that support good, restful sleep. It includes maintaining a consistent bedtime and wake time, using your bedroom only for sleeping or sex, and keeping the bedroom dark and free of distractions like a computer, smartphone, or television.     Develop a Healthy Routine  Maintain good hygiene. Get out of bed in the morning, make your bed, brush your teeth, take a shower, and get dressed. Don t spend too much time viewing media that makes you feel stressed. Find time to relax each day.    Exercise  Get some form of exercise every day. This will help reduce pain and release endorphins, the  feel good  chemicals in your brain. It can be as simple as just going for a walk or doing some gardening, anything that will get you moving.      Diet  Strive to eat healthy foods, including fruits and vegetables. Drink plenty of water. Avoid excessive sugar, caffeine, alcohol, and other mood-altering substances.     Stay Connected with Others  Stay in touch with friends and family members.    Manage Your Mood  Try deep breathing, massage therapy, biofeedback, or meditation. Take part in fun activities when you can. Try to find something to smile about each day.     Psychotherapy  Be open to working with a therapist if your provider recommends it.      Medication  Be sure to take your medication as prescribed. Most anti-depressants need to be taken every day. It usually takes several weeks for medications to work. Not all medicines work for all people. It is important to follow-up with your provider to make sure you have a treatment plan that is working for you. Do not stop your medication abruptly without first discussing it with your provider.    Crisis Resources   These hotlines are for both adults and children. They and are open 24 hours a day, 7 days a week unless noted otherwise.      National Suicide Prevention Lifeline   5-751-370-TALK (2225)      Crisis Text Line    www.crisistextline.org  Text HOME to 669415 from anywhere in the [place], anytime, about any type of crisis. A live, trained crisis counselor will receive the text and respond quickly.      [place] Lifeline for LGBTQ Youth  A national crisis intervention and suicide lifeline for LGBTQ youth under 25. Provides a safe place to talk without judgement. Call 1-898.802.5067; text START to 765209 or visit www.thetrevorproject.org to talk to a trained counselor.      For [facility] crisis numbers, visit the [facility] website at:  https://[place].gov/dhs/people-we-serve/adults/health-care/mental-health/resources/crisis-contacts.jsp

## 2021-06-01 NOTE — LETTER
2021       RE: Prieto Terrell  [address] [address]     Dear Colleague,    Thank you for referring your patient, Prieto Terrell, to the [facility]. Please see a copy of my visit note below.    Visit Date: 2021    SUBJECTIVE:  Prieto is a gentleman who saw Dr. Pelon Garrett recently,  and Dr. Garrett today was concerned about a lesion on his cheek.    OBJECTIVE:  Exam shows a healthy gentleman in no distress.  Present on his face are 2 actinic keratoses on the forehead and right left cheek.  On the left cheek, he has an eroded area that looks to me like a basal cell carcinoma.    ASSESSMENT:    1.  Probable basal cell carcinoma, left cheek.   2.  Actinic keratoses, other parts of face.    PLAN:  Recommended biopsy  so anesthetized after photographing the lesion and then did a shave removal of the bulk of the lesion and submitted this for microscopic evaluation.  Medications and drug allergies reviewed.  We will call him with the biopsy and set up further surgery if it is necessary.    RADHA Cheung MD        D: 2021   T: 2021   MT: MIGDALIA    Name:     PRIETO TERRELL  MRN:      [id number]        Account:    [id number]   :      1955           Visit Date: 2021     Document: [id number]    cc:  LANDY Garrett MD         Again, thank you for allowing me to participate in the care of your patient.      Sincerely,    RADHA Cheung MD

## 2021-06-01 NOTE — NURSING NOTE
"Chief Complaint   Patient presents with     Lipids     Hypertension     Diabetes     RECHECK       Initial /70   Pulse 60   Temp 97.4  F (36.3  C)   Resp 18   Wt 91.3 kg (201 lb 3.2 oz)   SpO2 98%   BMI 32.47 kg/m   Estimated body mass index is 32.47 kg/m  as calculated from the following:    Height as of 9/12/19: 1.676 m (5' 6\").    Weight as of this encounter: 91.3 kg (201 lb 3.2 oz).  Medication Reconciliation: renetta Dela Cruz  "

## 2021-06-01 NOTE — NURSING NOTE
"Chief Complaint   Patient presents with     Consult       Initial /70   Pulse 60   Temp 97.4  F (36.3  C)   Resp 18   SpO2 98%  Estimated body mass index is 32.47 kg/m  as calculated from the following:    Height as of 9/12/19: 1.676 m (5' 6\").    Weight as of an earlier encounter on 6/1/21: 91.3 kg (201 lb 3.2 oz).   All VS per concurrent appt with PCP.  Medication Reconciliation: complete  Gayathri Rivera LPN    "

## 2021-06-02 DIAGNOSIS — I10 ESSENTIAL HYPERTENSION: ICD-10-CM

## 2021-06-02 DIAGNOSIS — E11.9 CONTROLLED TYPE 2 DIABETES MELLITUS WITHOUT COMPLICATION, WITHOUT LONG-TERM CURRENT USE OF INSULIN (H): ICD-10-CM

## 2021-06-02 DIAGNOSIS — E78.5 ELEVATED LIPIDS: ICD-10-CM

## 2021-06-02 DIAGNOSIS — F41.9 ANXIETY: ICD-10-CM

## 2021-06-02 ASSESSMENT — ANXIETY QUESTIONNAIRES: GAD7 TOTAL SCORE: 0

## 2021-06-02 NOTE — PROGRESS NOTES
Visit Date: 2021    SUBJECTIVE:  Prieto is a gentleman who saw Dr. Pelon Garrett recently,  and Dr. Garrett today was concerned about a lesion on his cheek.    OBJECTIVE:  Exam shows a healthy gentleman in no distress.  Present on his face are 2 actinic keratoses on the forehead and right left cheek.  On the left cheek, he has an eroded area that looks to me like a basal cell carcinoma.    ASSESSMENT:    1.  Probable basal cell carcinoma, left cheek.   2.  Actinic keratoses, other parts of face.    PLAN:  Recommended biopsy  so anesthetized after photographing the lesion and then did a shave removal of the bulk of the lesion and submitted this for microscopic evaluation.  Medications and drug allergies reviewed.  We will call him with the biopsy and set up further surgery if it is necessary.    RADHA Cheung MD        D: 2021   T: 2021   MT: MIGDALIA    Name:     PRIETO MURRAY  MRN:      [id number]        Account:    [id number]   :      1955           Visit Date: 2021     Document: [id number]    cc:  LANDY Garrett MD

## 2021-06-03 RX ORDER — LOVASTATIN 20 MG
TABLET ORAL
Qty: 90 TABLET | Refills: 0 | Status: SHIPPED | OUTPATIENT
Start: 2021-06-03 | End: 2021-11-02

## 2021-06-03 RX ORDER — SERTRALINE HYDROCHLORIDE 100 MG/1
TABLET, FILM COATED ORAL
Qty: 30 TABLET | Refills: 0 | Status: SHIPPED | OUTPATIENT
Start: 2021-06-03 | End: 2021-07-07

## 2021-06-03 RX ORDER — LISINOPRIL 10 MG/1
TABLET ORAL
Qty: 90 TABLET | Refills: 0 | Status: SHIPPED | OUTPATIENT
Start: 2021-06-03 | End: 2021-11-02

## 2021-06-03 NOTE — TELEPHONE ENCOUNTER
metFORMIN (GLUCOPHAGE) 1000mg      Last Written Prescription Date:  2/11/2021  Last Fill Quantity: 180,   # refills: 0  Last Office Visit: 6/1/2021  Future Office visit:       Routing refill request to provider for review/approval because:    Lisinopril (ZESTRIL) 10mg      Last Written Prescription Date:  3/23/2021  Last Fill Quantity: 90,   # refills: 0  Last Office Visit: 6/1/2021  Future Office visit:       Routing refill request to provider for review/approval because:    Lovastatin (MEVACOR) 20mg      Last Written Prescription Date:  11/12/2020  Last Fill Quantity: 90,   # refills: 0  Last Office Visit: 6/1/2021  Future Office visit:       Routing refill request to provider for review/approval because:    Sertraline (ZOLOFT) 100mg      Last Written Prescription Date:  3/23/2021  Last Fill Quantity: 30,   # refills: 0  Last Office Visit: 6/1/2021  Future Office visit:       Routing refill request to provider for review/approval because:

## 2021-06-05 LAB — COPATH REPORT: NORMAL

## 2021-07-07 DIAGNOSIS — F41.9 ANXIETY: ICD-10-CM

## 2021-07-07 RX ORDER — SERTRALINE HYDROCHLORIDE 100 MG/1
TABLET, FILM COATED ORAL
Qty: 30 TABLET | Refills: 0 | Status: SHIPPED | OUTPATIENT
Start: 2021-07-07 | End: 2021-08-25

## 2021-07-07 NOTE — TELEPHONE ENCOUNTER
Zoloft 100mg      Last Written Prescription Date:  6/3/21  Last Fill Quantity: 30,   # refills: 0  Last Office Visit: 6/1/21  Future Office visit:       Routing refill request to provider for review/approval because:

## 2021-08-05 NOTE — PROGRESS NOTES
Otolaryngology Consultation    Patient: Dirk Terrell  : 1955    Patient presents with:  Consult: Left Cheek Basal Cell Carcinoma; Referred by Dr. Cheung      HPI:  Dirk Terrell is a 66 year old male seen today for a left cheek basal cell carcinoma.    He saw Dr. Cheung on  shave biopsy was performed and confirmed basal cell carcinoma extending to the deep margin      The lesion has been present about 12 months, changing and growing.  He states was scheduled to have it removed several months ago but the lesion seemed to disappear.    Significant hx of left lower lip SCCA, excised in OR about 20 years ago by Dr. Roldan.  Former pipe smoker quit 20 years ago.  No known additional history of carcinoma skin or melanoma.  Excess sun exposure with sun burns throughout life. No immunodeficiency.  There is no history of solid organ transplant.      FINAL DIAGNOSIS:   Skin, left facial cheek, shave:   - Basal cell carcinoma, infiltrating type, extending to the deep margin -   (see description)               Current Outpatient Rx   Medication Sig Dispense Refill     blood glucose monitoring (SHAI MICROLET) lancets Use to test blood sugar one time daily or as directed. 100 each 3     Glucose Blood (BLOOD GLUCOSE TEST STRIPS) STRP Test once daily 100 each 3     indomethacin (INDOCIN) 50 MG capsule Take 1 capsule (50 mg) by mouth 3 times daily as needed for moderate pain 30 capsule 3     lisinopril (ZESTRIL) 10 MG tablet Take 1 tablet by mouth once daily 90 tablet 0     lovastatin (MEVACOR) 20 MG tablet TAKE 1 TABLET BY MOUTH ONCE DAILY AT BEDTIME 90 tablet 0     metFORMIN (GLUCOPHAGE) 1000 MG tablet TAKE 1 TABLET BY MOUTH TWICE DAILY WITH MEALS 180 tablet 0     order for DME autoCPAP 9-15 cmh20 1 Device 0     sertraline (ZOLOFT) 100 MG tablet Take 1 tablet by mouth once daily 30 tablet 0       Allergies: Patient has no known allergies.     Past Medical History:   Diagnosis Date     Gout     history of gout  "       Past Surgical History:   Procedure Laterality Date     COLONOSCOPY N/A 2016    one polyp was told to repeat in 5 years     CYSTOSCOPY,+URETEROSCOPY  2002    stent     ORTHOPEDIC SURGERY  2013    Left rotator cuff surgery     ORTHOPEDIC SURGERY  2011    left rotator cuff surgery     TONSILLECTOMY  1963       ENT family history reviewed    Social History     Tobacco Use     Smoking status: Former Smoker     Packs/day: 1.50     Years: 15.00     Pack years: 22.50     Quit date:      Years since quittin.6     Smokeless tobacco: Never Used   Substance Use Topics     Alcohol use: Yes     Comment: 12/week     Drug use: No       Review of Systems  ROS: 10 point ROS neg other than the symptoms noted above in the HPI     Physical Exam  /60 (BP Location: Right arm, Patient Position: Chair, Cuff Size: Adult Regular)   Pulse 66   Temp 97.1  F (36.2  C) (Tympanic)   Ht 1.676 m (5' 6\")   Wt 90.7 kg (200 lb)   SpO2 97%   BMI 32.28 kg/m    General - The patient is well nourished and well developed, and appears to have good nutritional status.  Alert and oriented to person and place, answers questions and cooperates with examination appropriately.   Head and Face - Normocephalic and atraumatic, with no gross asymmetry noted. The facial nerve is intact, with strong symmetric movements.  Grade 1/6 bilaterally  There is a left lower cheek healing biopsy site at the left preauricular skin about 1.0 cm  Voice and Breathing - The patient was breathing comfortably without the use of accessory muscles. There was no wheezing, stridor, or stertor.  The patients voice was clear and strong, and had appropriate pitch and quality.  No demetria peripheral digital clubbing or cyanosis   Ears -The external auditory canals are patent, the tympanic membranes are intact without effusion, retraction or mass.  Bony landmarks are intact.  Eyes - Extraocular movements intact, and the pupils were reactive to light.  Sclera were " not icteric or injected, conjunctiva were pink and moist.  Mouth - Examination of the oral cavity showed pink, healthy oral mucosa. No lesions or ulcerations noted.  The tongue was mobile and midline, and the dentition were in good condition.   Well healed left lower lip excision site, no palpable or visible ulceration or mass oral cavity.  Throat - The walls of the oropharynx were smooth, pink, moist, symmetric, and had no lesions or ulcerations.  The tonsillar pillars and soft palate were symmetric.  The uvula was midline on elevation.  Tonsillar fossa clear  Neck - No palpable enlarged fixed cervical lymph nodes.  No neck cysts or unusual tenderness to palpation.   No palpable fixed thyroid nodules or concerning goiter.  The trachea is grossly midline. No palpable parotid or submandibular mass  Nose - Nasal mucosa is pink and moist with no abnormal mucus.        Office Procedure:   I discussed the risks and complications of skin lesion excision, including local anesthesia, bleeding, infection, injury to the facial nerve, scar formation, hypertrophic healing or keloid, numbness to area, recurrence of lesion, benign versus malignant pathology and possible need for further surgery. All questions were answered.    After informed consent was discussed and a time- out taken, I proceeded to cleanse the skin around the lesion with alcohol.  I then demarcated the lesion parallel to relaxed skin tension lines in a natural crease.  The area was prepped and draped in the normal sterile fashion.  I then infiltrated the skin with 1% lidocaine with 1:200,000 epinephrine.  I  used a 15-blade to create an elliptical incision around the lesion at the left lower cheek, with margins of 3-4 mm of grossly normal skin.  I then elevated the skin ellipse and a thin cuff of underlying subdermal fat with curved iris scissors.  I dissected down through normal subcutaneous tissue for complete removal of the lesion.  After the lesion was  completely removed, I then placed it in formalin for permanent section.  Hemostasis was achieved with direct pressure and hand held cautery. I then irrigated the wound and gently suctioned the area.  I advanced the adjacent skin for tension free closure using tenotomy scissors.  The total length of the incision was  3.1 cm.  I then proceeded to close the incision by using simple interrupted buried knot sutures, using 5-0 Vicryl.  The skin edges were then reapproximated using 4 and 5-0 nylon, simple interrupted sutures.  Antibiotic ointment was then applied and the wound was dressed.  The patient tolerated the procedure without any difficulty.          Impression and Plan- Dirktawanda Terrell is a 66 year old male with:    ICD-10-CM    1. BCC (basal cell carcinoma), face  C44.310 Surgical pathology exam     EXC MALIG SKIN LESION FACE/EARS 3.1-4.0 CM   2. History of oral squamous cell carcinoma  Z85.89      Reassured, no recurrence of oral SCC    Additional surgery may need to be scheduled based on final pathology of cheek BCC  This was discussed today.    Further procedures may include skin excision with frozens and flap repair.    The risks of surgery were discussed, if further surgery is necessary.  These include but are not limited to anesthesia, scar or keloid formation, infection, flap failure, change in appearance of surrounding facial structures,  numbness to the skin, injury to the facial nerve with permanent facial weakness which is exceedingly rare but possible.  Temporary weakness to the face may occur with the use of local anesthesia.    The patient expressed understanding.  All questions were answered.  Photos were taken.      I have instructed the patient on wound care and signs of infection.  Written instructions provided.  We will contact the patient with pathology results.    Sunscreen use and skin cancer preventive measures were reinforced    Radha Moeller D.O.  Otolaryngology/Head and Neck  Surgery  Allergy

## 2021-08-09 ENCOUNTER — OFFICE VISIT (OUTPATIENT)
Dept: OTOLARYNGOLOGY | Facility: OTHER | Age: 66
End: 2021-08-09
Attending: OTOLARYNGOLOGY
Payer: MEDICARE

## 2021-08-09 VITALS
WEIGHT: 200 LBS | HEART RATE: 66 BPM | BODY MASS INDEX: 32.14 KG/M2 | TEMPERATURE: 97.1 F | OXYGEN SATURATION: 97 % | DIASTOLIC BLOOD PRESSURE: 60 MMHG | SYSTOLIC BLOOD PRESSURE: 130 MMHG | HEIGHT: 66 IN

## 2021-08-09 DIAGNOSIS — Z85.89 HISTORY OF SQUAMOUS CELL CARCINOMA: ICD-10-CM

## 2021-08-09 DIAGNOSIS — C44.310 BCC (BASAL CELL CARCINOMA), FACE: Primary | ICD-10-CM

## 2021-08-09 PROCEDURE — G0463 HOSPITAL OUTPT CLINIC VISIT: HCPCS | Mod: 25

## 2021-08-09 PROCEDURE — 11644 EXC F/E/E/N/L MAL+MRG 3.1-4: CPT | Performed by: OTOLARYNGOLOGY

## 2021-08-09 PROCEDURE — 88305 TISSUE EXAM BY PATHOLOGIST: CPT | Mod: TC | Performed by: OTOLARYNGOLOGY

## 2021-08-09 PROCEDURE — 99203 OFFICE O/P NEW LOW 30 MIN: CPT | Mod: 25 | Performed by: OTOLARYNGOLOGY

## 2021-08-09 ASSESSMENT — PAIN SCALES - GENERAL: PAINLEVEL: NO PAIN (0)

## 2021-08-09 ASSESSMENT — MIFFLIN-ST. JEOR: SCORE: 1634.94

## 2021-08-09 NOTE — NURSING NOTE
"Chief Complaint   Patient presents with     Consult     Left Cheek Basal Cell Carcinoma; Referred by Dr. Cheung       Initial /60 (BP Location: Right arm, Patient Position: Chair, Cuff Size: Adult Regular)   Pulse 66   Temp 97.1  F (36.2  C) (Tympanic)   Ht 1.676 m (5' 6\")   Wt 90.7 kg (200 lb)   SpO2 97%   BMI 32.28 kg/m   Estimated body mass index is 32.28 kg/m  as calculated from the following:    Height as of this encounter: 1.676 m (5' 6\").    Weight as of this encounter: 90.7 kg (200 lb).  Medication Reconciliation: complete  FLORENCIA VALDEZ LPN    "

## 2021-08-09 NOTE — LETTER
2021         RE: Dirk Terrell  [address] [facility] [address]        Dear Colleague,    Thank you for referring your patient, Dirk Terrell, to the [facility]. Please see a copy of my visit note below.    Otolaryngology Consultation    Patient: Dirk Terrell  : 1955    Patient presents with:  Consult: Left Cheek Basal Cell Carcinoma; Referred by Dr. Cheung      HPI:  Dirk Terrell is a 66 year old male seen today for a left cheek basal cell carcinoma.    He saw Dr. Cheung on  shave biopsy was performed and confirmed basal cell carcinoma extending to the deep margin      The lesion has been present about 12 months, changing and growing.  He states was scheduled to have it removed several months ago but the lesion seemed to disappear.    Significant hx of left lower lip SCCA, excised in OR about 20 years ago by Dr. Roldan.  Former pipe smoker quit 20 years ago.  No known additional history of carcinoma skin or melanoma.  Excess sun exposure with sun burns throughout life. No immunodeficiency.  There is no history of solid organ transplant.      FINAL DIAGNOSIS:   Skin, left facial cheek, shave:   - Basal cell carcinoma, infiltrating type, extending to the deep margin -   (see description)               Current Outpatient Rx   Medication Sig Dispense Refill     blood glucose monitoring (SHAI MICROLET) lancets Use to test blood sugar one time daily or as directed. 100 each 3     Glucose Blood (BLOOD GLUCOSE TEST STRIPS) STRP Test once daily 100 each 3     indomethacin (INDOCIN) 50 MG capsule Take 1 capsule (50 mg) by mouth 3 times daily as needed for moderate pain 30 capsule 3     lisinopril (ZESTRIL) 10 MG tablet Take 1 tablet by mouth once daily 90 tablet 0     lovastatin (MEVACOR) 20 MG tablet TAKE 1 TABLET BY MOUTH ONCE DAILY AT BEDTIME 90 tablet 0     metFORMIN (GLUCOPHAGE) 1000 MG tablet TAKE 1 TABLET BY MOUTH TWICE DAILY WITH MEALS 180 tablet 0     order  "for DME autoCPAP 9-15 cmh20 1 Device 0     sertraline (ZOLOFT) 100 MG tablet Take 1 tablet by mouth once daily 30 tablet 0       Allergies: Patient has no known allergies.     Past Medical History:   Diagnosis Date     Gout     history of gout        Past Surgical History:   Procedure Laterality Date     COLONOSCOPY N/A 2016    one polyp was told to repeat in 5 years     CYSTOSCOPY,+URETEROSCOPY  2002    stent     ORTHOPEDIC SURGERY      Left rotator cuff surgery     ORTHOPEDIC SURGERY      left rotator cuff surgery     TONSILLECTOMY  1963       ENT family history reviewed    Social History     Tobacco Use     Smoking status: Former Smoker     Packs/day: 1.50     Years: 15.00     Pack years: 22.50     Quit date:      Years since quittin.6     Smokeless tobacco: Never Used   Substance Use Topics     Alcohol use: Yes     Comment: 12/week     Drug use: No       Review of Systems  ROS: 10 point ROS neg other than the symptoms noted above in the HPI     Physical Exam  /60 (BP Location: Right arm, Patient Position: Chair, Cuff Size: Adult Regular)   Pulse 66   Temp 97.1  F (36.2  C) (Tympanic)   Ht 1.676 m (5' 6\")   Wt 90.7 kg (200 lb)   SpO2 97%   BMI 32.28 kg/m    General - The patient is well nourished and well developed, and appears to have good nutritional status.  Alert and oriented to person and place, answers questions and cooperates with examination appropriately.   Head and Face - Normocephalic and atraumatic, with no gross asymmetry noted. The facial nerve is intact, with strong symmetric movements.  Grade 1/6 bilaterally  There is a left lower cheek healing biopsy site at the left preauricular skin about 1.0 cm  Voice and Breathing - The patient was breathing comfortably without the use of accessory muscles. There was no wheezing, stridor, or stertor.  The patients voice was clear and strong, and had appropriate pitch and quality.  No demetria peripheral digital clubbing or " cyanosis   Ears -The external auditory canals are patent, the tympanic membranes are intact without effusion, retraction or mass.  Bony landmarks are intact.  Eyes - Extraocular movements intact, and the pupils were reactive to light.  Sclera were not icteric or injected, conjunctiva were pink and moist.  Mouth - Examination of the oral cavity showed pink, healthy oral mucosa. No lesions or ulcerations noted.  The tongue was mobile and midline, and the dentition were in good condition.   Well healed left lower lip excision site, no palpable or visible ulceration or mass oral cavity.  Throat - The walls of the oropharynx were smooth, pink, moist, symmetric, and had no lesions or ulcerations.  The tonsillar pillars and soft palate were symmetric.  The uvula was midline on elevation.  Tonsillar fossa clear  Neck - No palpable enlarged fixed cervical lymph nodes.  No neck cysts or unusual tenderness to palpation.   No palpable fixed thyroid nodules or concerning goiter.  The trachea is grossly midline. No palpable parotid or submandibular mass  Nose - Nasal mucosa is pink and moist with no abnormal mucus.        Office Procedure:   I discussed the risks and complications of skin lesion excision, including local anesthesia, bleeding, infection, injury to the facial nerve, scar formation, hypertrophic healing or keloid, numbness to area, recurrence of lesion, benign versus malignant pathology and possible need for further surgery. All questions were answered.    After informed consent was discussed and a time- out taken, I proceeded to cleanse the skin around the lesion with alcohol.  I then demarcated the lesion parallel to relaxed skin tension lines in a natural crease.  The area was prepped and draped in the normal sterile fashion.  I then infiltrated the skin with 1% lidocaine with 1:200,000 epinephrine.  I  used a 15-blade to create an elliptical incision around the lesion at the left lower cheek, with margins of 3-4  mm of grossly normal skin.  I then elevated the skin ellipse and a thin cuff of underlying subdermal fat with curved iris scissors.  I dissected down through normal subcutaneous tissue for complete removal of the lesion.  After the lesion was completely removed, I then placed it in formalin for permanent section.  Hemostasis was achieved with direct pressure and hand held cautery. I then irrigated the wound and gently suctioned the area.  I advanced the adjacent skin for tension free closure using tenotomy scissors.  The total length of the incision was  3.1 cm.  I then proceeded to close the incision by using simple interrupted buried knot sutures, using 5-0 Vicryl.  The skin edges were then reapproximated using 4 and 5-0 nylon, simple interrupted sutures.  Antibiotic ointment was then applied and the wound was dressed.  The patient tolerated the procedure without any difficulty.          Impression and Plan- Dirk TONYA Tererll is a 66 year old male with:    ICD-10-CM    1. BCC (basal cell carcinoma), face  C44.310 Surgical pathology exam     EXC MALIG SKIN LESION FACE/EARS 3.1-4.0 CM   2. History of oral squamous cell carcinoma  Z85.89      Reassured, no recurrence of oral SCC    Additional surgery may need to be scheduled based on final pathology of cheek BCC  This was discussed today.    Further procedures may include skin excision with frozens and flap repair.    The risks of surgery were discussed, if further surgery is necessary.  These include but are not limited to anesthesia, scar or keloid formation, infection, flap failure, change in appearance of surrounding facial structures,  numbness to the skin, injury to the facial nerve with permanent facial weakness which is exceedingly rare but possible.  Temporary weakness to the face may occur with the use of local anesthesia.    The patient expressed understanding.  All questions were answered.  Photos were taken.      I have instructed the patient on wound care  and signs of infection.  Written instructions provided.  We will contact the patient with pathology results.    Sunscreen use and skin cancer preventive measures were reinforced    Radha Moeller D.O.  Otolaryngology/Head and Neck Surgery  Allergy        Again, thank you for allowing me to participate in the care of your patient.        Sincerely,        Radha Moeller MD

## 2021-08-09 NOTE — PATIENT INSTRUCTIONS
Thank you for allowing Dr. Moeller and our ENT team to participate in your care.  If your medications are too expensive, please give the nurse a call.  We can possibly change this medication.  If you have a scheduling or an appointment question please contact our Health Unit Coordinator at their direct line [phone].   ALL nursing questions or concerns can be directed to your ENT nurse at: [phone] - Megha      POST PROCEDURE INSTRUCTIONS      Remove your dressing in 24 hours (If you have one)    Wash incision with Gentle Cleanser Twice Daily (Cetaphil, Baby Shampoo, etc)    Apply Bacitracin Ointment Three Times Daily; After 1 week, use Aquaphor Ointment     Cover with a clean dressing if in a dirty jony environment or when wet/soiled    Keep incision clean and dry   Do NOT soak in water such as a tub bath or swimming   Do NOT put make-up, powders, hairspray, lotions, etc on the incision       You can apply ice to the surgical area to help reduce swelling. (no longer than 20 minutes at a time)      You can use acetaminophen(Tylenol) or the prescription you received for pain.       If you have any bleeding, cover the wound with clean gauze and hold pressure for 10 Minutes. If the bleeding does not stop or is heavy and profuse, call the clinic or go to the Urgent Care/Emergency Department.    SIGNS OF INFECTION ARE:    Redness, swelling, red streaks, pus, drainage, warmth, fever, increased pain, foul smell.     Contact your primary health care provider if you notice any of the warning signs.     FOLLOW - UP    Follow-up in clinic for a nurse only visit in 7 days for suture removal.     Pathology results will be called to you when they are back. Usually 7-10 days.      6 WEEKS POST PROCEDURE      Apply ANY type of lotion to the suture site(Example - Vaseline Intensive Care or Vitamin E)    Massage the surgical area 1-2 times daily in a circular motion for 5 minutes, for a period of 2 months. This  will help the scar heal better.

## 2021-08-12 LAB
PATH REPORT.COMMENTS IMP SPEC: NORMAL
PATH REPORT.COMMENTS IMP SPEC: NORMAL
PATH REPORT.FINAL DX SPEC: NORMAL
PATH REPORT.GROSS SPEC: NORMAL
PATH REPORT.MICROSCOPIC SPEC OTHER STN: NORMAL
PATH REPORT.RELEVANT HX SPEC: NORMAL
PHOTO IMAGE: NORMAL

## 2021-08-12 PROCEDURE — 88305 TISSUE EXAM BY PATHOLOGIST: CPT | Mod: 26 | Performed by: PATHOLOGY

## 2021-10-03 ENCOUNTER — HEALTH MAINTENANCE LETTER (OUTPATIENT)
Age: 66
End: 2021-10-03

## 2021-11-02 DIAGNOSIS — I10 ESSENTIAL HYPERTENSION: ICD-10-CM

## 2021-11-02 DIAGNOSIS — E11.9 CONTROLLED TYPE 2 DIABETES MELLITUS WITHOUT COMPLICATION, WITHOUT LONG-TERM CURRENT USE OF INSULIN (H): ICD-10-CM

## 2021-11-02 DIAGNOSIS — E78.5 ELEVATED LIPIDS: ICD-10-CM

## 2021-11-02 RX ORDER — LISINOPRIL 10 MG/1
TABLET ORAL
Qty: 90 TABLET | Refills: 0 | Status: SHIPPED | OUTPATIENT
Start: 2021-11-02 | End: 2022-03-11

## 2021-11-02 RX ORDER — LOVASTATIN 20 MG
TABLET ORAL
Qty: 90 TABLET | Refills: 0 | Status: SHIPPED | OUTPATIENT
Start: 2021-11-02 | End: 2022-03-11

## 2021-11-02 NOTE — TELEPHONE ENCOUNTER
Lisinopril      Last Written Prescription Date:  06/03/21  Last Fill Quantity: 90,   # refills: 0  Last Office Visit: 06/01/21  Future Office visit:         Mevacor      Last Written Prescription Date:  06/03/21  Last Fill Quantity: 90,   # refills: 0  Last Office Visit: 06/01/21  Future Office visit:         Metformin      Last Written Prescription Date:  06/03/21  Last Fill Quantity: 180,   # refills: 0  Last Office Visit: 06/01/21  Future Office visit:

## 2021-11-04 ENCOUNTER — IMMUNIZATION (OUTPATIENT)
Dept: FAMILY MEDICINE | Facility: OTHER | Age: 66
End: 2021-11-04
Attending: FAMILY MEDICINE
Payer: MEDICARE

## 2021-11-04 PROCEDURE — 90662 IIV NO PRSV INCREASED AG IM: CPT

## 2021-11-04 PROCEDURE — G0008 ADMIN INFLUENZA VIRUS VAC: HCPCS

## 2021-11-04 PROCEDURE — 91300 PR COVID VAC PFIZER DIL RECON 30 MCG/0.3 ML IM: CPT

## 2022-01-11 NOTE — PROGRESS NOTES
"  Assessment & Plan     Type 2 diabetes mellitus without complication, without long-term current use of insulin (H)  Well controlled currently with oral hypoglycemic alone  Most recent HA1c 6.7 06/21 and generally 7 or less  Will recheck  Will reorder test strips  Will obtain urine micro-albumin as most recent in 2019  Diabetic foot exam at next visit  Advised patient to schedule eye exam as overdue for this    Actinic keratosis  Patient with facial and body AKs  History of BCC, recently removed by ENT  RTC for full body skin exam and treatment of AKs    Arnold-Chiari malformation (H)  With syringomyelia, stable  Patient does report reduced sensitivity to warm/cold but denies any changes or new neurologic deficits  No imaging on file  Has not seen neurologist for some number of years, but given that he is clinically stable, I see no urgent need for a consultation at this time  Will CTM    History of smoking 10-25 pack years  Quit smoking in 1988, smoking 1.5 PPD x 15 years  Due for AAA    Depression, unspecified depression type  Patient on Zoloft 100 mg, tolerating well  Dose is working well for him and his symptoms are well controlled   [id number]     BMI:   Estimated body mass index is 32.6 kg/m  as calculated from the following:    Height as of this encounter: 1.676 m (5' 6\").    Weight as of this encounter: 91.6 kg (202 lb).     Return in about 4 weeks (around 2/9/2022) for Follow up.    Sandra Robles MD  [facility] - [place] Cavazos is a 66 year old who presents to clinic to establish care.  Patient has a history of BCC,SCC of lip (left side) AKs, arnold-chiari malformation (unspecified type), syringomyelia, NIDDM II, depression here to establish care and follow-up on chronic conditions    Patient has spent a lot of time outdoors during his free time.  He is retired now.  He had a BCC that was recently diagnosed in 2021 by dermatology and excised by ENT in 2021.  He has no concerns " about any new lesions on his body.     As for his arnold-chiari malformation, patient is not developing new symptoms.  He does have baseline decreased sensitivity to hot and cold but denies ay neurologic changes recently.    Family history- No updates       HPI     Diabetes Follow-up    How often are you checking your blood sugar? A few times a week  What time of day are you checking your blood sugars (select all that apply)?  After meals  Have you had any blood sugars above 200?  No  Have you had any blood sugars below 70?  No    What symptoms do you notice when your blood sugar is low?  None    What concerns do you have today about your diabetes? None     Do you have any of these symptoms? (Select all that apply)  No numbness or tingling in feet.  No redness, sores or blisters on feet.  No complaints of excessive thirst.  No reports of blurry vision.  No significant changes to weight.    Have you had a diabetic eye exam in the last 12 months? No       Hyperlipidemia Follow-Up      Are you regularly taking any medication or supplement to lower your cholesterol?   Yes- lovastatin    Are you having muscle aches or other side effects that you think could be caused by your cholesterol lowering medication?  No    Hypertension Follow-up      Do you check your blood pressure regularly outside of the clinic? No     Are you following a low salt diet? Yes    Are your blood pressures ever more than 140 on the top number (systolic) OR more   than 90 on the bottom number (diastolic), for example 140/90? No    BP Readings from Last 2 Encounters:   08/09/21 130/60   06/01/21 120/70     Hemoglobin A1C POCT (%)   Date Value   06/01/2021 6.7 (H)   09/12/2019 6.7 (H)     LDL Cholesterol Calculated (mg/dL)   Date Value   06/01/2021 78   09/12/2019 87       Depression Followup    How are you doing with your depression since your last visit? No change    Are you having other symptoms that might be associated with depression? No    Have  you had a significant life event?  No     Are you feeling anxious or having panic attacks?   No    Do you have any concerns with your use of alcohol or other drugs? No    Social History     Tobacco Use     Smoking status: Former Smoker     Packs/day: 1.50     Years: 15.00     Pack years: 22.50     Quit date:      Years since quittin.0     Smokeless tobacco: Never Used   Substance Use Topics     Alcohol use: Yes     Comment: 12/week     Drug use: No     PHQ 7/3/2018 2021 2022   PHQ-9 Total Score 15 3 4   Q9: Thoughts of better off dead/self-harm past 2 weeks Not at all Not at all Not at all     MARTY-7 SCORE 7/3/2018 2021 2022   Total Score 12 0 0     Last PHQ-9 2022   1.  Little interest or pleasure in doing things 1   2.  Feeling down, depressed, or hopeless 0   3.  Trouble falling or staying asleep, or sleeping too much 1   4.  Feeling tired or having little energy 1   5.  Poor appetite or overeating 1   6.  Feeling bad about yourself 0   7.  Trouble concentrating 0   8.  Moving slowly or restless 0   Q9: Thoughts of better off dead/self-harm past 2 weeks 0   PHQ-9 Total Score 4   Difficulty at work, home, or with people -     MARTY-7  2022   1. Feeling nervous, anxious, or on edge 0   2. Not being able to stop or control worrying 0   3. Worrying too much about different things 0   4. Trouble relaxing 0   5. Being so restless that it is hard to sit still 0   6. Becoming easily annoyed or irritable 0   7. Feeling afraid, as if something awful might happen 0   MARTY-7 Total Score 0   If you checked any problems, how difficult have they made it for you to do your work, take care of things at home, or get along with other people? -         Review of Systems   Constitutional, HEENT, cardiovascular, pulmonary, gi and gu systems are negative, except as otherwise noted.      Objective    There were no vitals taken for this visit.  There is no height or weight on file to calculate  BMI.  Physical Exam   GENERAL: healthy, alert and no distress  EYES: Eyes grossly normal to inspection, PERRL and conjunctivae and sclerae normal  HENT: ear canals and TM's normal, nose and mouth without ulcers or lesions  NECK: no adenopathy, no asymmetry, masses, or scars and thyroid normal to palpation  RESP: lungs clear to auscultation - no rales, rhonchi or wheezes  CV: regular rate and rhythm, normal S1 S2, no S3 or S4, no murmur, click or rub, no peripheral edema and peripheral pulses strong  ABDOMEN: soft, nontender, no hepatosplenomegaly, no masses and bowel sounds normal  MS: no gross musculoskeletal defects noted, no edema  SKIN: Scattered AKs on face and arms.  BCC excision scar is well healed and subtle.SCC scar is well healed with no new overlying lesions.  NEURO: Normal strength and tone, mentation intact and speech normal  PSYCH: mentation appears normal, affect normal/bright

## 2022-01-12 ENCOUNTER — OFFICE VISIT (OUTPATIENT)
Dept: FAMILY MEDICINE | Facility: OTHER | Age: 67
End: 2022-01-12
Attending: STUDENT IN AN ORGANIZED HEALTH CARE EDUCATION/TRAINING PROGRAM
Payer: MEDICARE

## 2022-01-12 VITALS
WEIGHT: 202 LBS | OXYGEN SATURATION: 97 % | HEIGHT: 66 IN | BODY MASS INDEX: 32.47 KG/M2 | TEMPERATURE: 96.3 F | SYSTOLIC BLOOD PRESSURE: 120 MMHG | HEART RATE: 66 BPM | DIASTOLIC BLOOD PRESSURE: 70 MMHG

## 2022-01-12 DIAGNOSIS — Z87.891 HISTORY OF SMOKING 10-25 PACK YEARS: ICD-10-CM

## 2022-01-12 DIAGNOSIS — E11.9 TYPE 2 DIABETES MELLITUS WITHOUT COMPLICATION, WITHOUT LONG-TERM CURRENT USE OF INSULIN (H): Primary | ICD-10-CM

## 2022-01-12 DIAGNOSIS — F32.A DEPRESSION, UNSPECIFIED DEPRESSION TYPE: ICD-10-CM

## 2022-01-12 DIAGNOSIS — Q07.00 ARNOLD-CHIARI MALFORMATION (H): ICD-10-CM

## 2022-01-12 DIAGNOSIS — L57.0 ACTINIC KERATOSIS: ICD-10-CM

## 2022-01-12 PROCEDURE — G0463 HOSPITAL OUTPT CLINIC VISIT: HCPCS

## 2022-01-12 PROCEDURE — 99214 OFFICE O/P EST MOD 30 MIN: CPT | Performed by: STUDENT IN AN ORGANIZED HEALTH CARE EDUCATION/TRAINING PROGRAM

## 2022-01-12 ASSESSMENT — ANXIETY QUESTIONNAIRES
1. FEELING NERVOUS, ANXIOUS, OR ON EDGE: NOT AT ALL
5. BEING SO RESTLESS THAT IT IS HARD TO SIT STILL: NOT AT ALL
3. WORRYING TOO MUCH ABOUT DIFFERENT THINGS: NOT AT ALL
GAD7 TOTAL SCORE: 0
2. NOT BEING ABLE TO STOP OR CONTROL WORRYING: NOT AT ALL
6. BECOMING EASILY ANNOYED OR IRRITABLE: NOT AT ALL
7. FEELING AFRAID AS IF SOMETHING AWFUL MIGHT HAPPEN: NOT AT ALL
4. TROUBLE RELAXING: NOT AT ALL

## 2022-01-12 ASSESSMENT — PAIN SCALES - GENERAL: PAINLEVEL: MILD PAIN (2)

## 2022-01-12 ASSESSMENT — MIFFLIN-ST. JEOR: SCORE: 1644.02

## 2022-01-12 ASSESSMENT — PATIENT HEALTH QUESTIONNAIRE - PHQ9: SUM OF ALL RESPONSES TO PHQ QUESTIONS 1-9: 4

## 2022-01-12 NOTE — NURSING NOTE
"Chief Complaint   Patient presents with     Establish Care       Initial /70 (BP Location: Left arm, Patient Position: Chair, Cuff Size: Adult Regular)   Pulse 66   Temp (!) 96.3  F (35.7  C) (Tympanic)   Ht 1.676 m (5' 6\")   Wt 91.6 kg (202 lb)   SpO2 97%   BMI 32.60 kg/m   Estimated body mass index is 32.6 kg/m  as calculated from the following:    Height as of this encounter: 1.676 m (5' 6\").    Weight as of this encounter: 91.6 kg (202 lb).  Medication Reconciliation: complete  BRANDON PASCAL LPN  "

## 2022-01-13 ASSESSMENT — ANXIETY QUESTIONNAIRES: GAD7 TOTAL SCORE: 0

## 2022-01-22 ENCOUNTER — HEALTH MAINTENANCE LETTER (OUTPATIENT)
Age: 67
End: 2022-01-22

## 2022-02-09 NOTE — PROGRESS NOTES
"  Assessment & Plan     Type 2 diabetes mellitus without complication, without long-term current use of insulin (H)  On metformin 1000 mg BID, will need to obtain routine labs for monitoring  - Albumin Random Urine Quantitative with Creat Ratio  - Hemoglobin A1c    Benign essential HTN  Well controlled and stable on current regimen, BP in target range    Actinic keratosis  Patient with solitary AK on his mid back.    Discussed that this can be removed with cryotherapy  Bilateral upper extremities with subtle dry patches, improved from prior with lotion.  However, these are suspicious for AKs as well  Will have to follow these lesions.  As they are abundant, cryotherapy would not be ideal.  Topical therapy vs photodynamic therapy.  Will discuss this further at follow-up visit as these do not need to be addressed urgently.         BMI:   Estimated body mass index is 30.67 kg/m  as calculated from the following:    Height as of 1/12/22: 1.676 m (5' 6\").    Weight as of this encounter: 86.2 kg (190 lb).   Weight management plan: Discussed healthy diet and exercise guidelines      No follow-ups on file.    Sandra Robles MD  [facility] - [place] Cavazos is a 66 year old who presents for the following health issues    HPI   Concern - Diarrhea - Last colonoscopy 11/1/2016      Supplemental fiber has helped.  With different types of foods- gets diarrhea.  Had salad, half hour later goes to bathroom.    Dark-stools.  Fiber addition has lightened up the color.  Has never been normal his entire life.  Sometimes gets gassy.  No blood in stool.  No mucus.  Sometimes looks oil.  No pain in RUQ after meals.  Still has gallbladder.   Not bloody looking.  Last colonoscopy 5 years ago, normal.  10 year intervals.  At times gets constipated.  Used to be diarrhea.  Has sense of relief after a bowel movement.    Getting since getting more fiber in.        Good sensation in feet throughout.        Onset: Life " long     Description:   Progressively predominant Diarrhea with dieting     Intensity: mild, moderate    Progression of Symptoms:  same    Accompanying Signs & Symptoms:  Upset stomach , LLQ pain     Previous history of similar problem:   Pt reports inconsistent bowel patterns spanning his lifetime     Precipitating factors:   Worsened by: Unknown     Alleviating factors:  Improved by: Unknown     Therapies Tried and outcome: Patient changed diet for weight loss 1 month ago -   Started Psyllium and helps, not completely effective     Skin Lesion Follow up//Nonspecific   Onset/Duration: Ongoing  Description  Location: Face and Body  Color: pink  Border description: scaly  Character: flakey  Itching: no  Bleeding:  no  Intensity:  mild  Progression of Symptoms:  constant  Accompanying signs and symptoms:   Bleeding: no  Scaling: no  Excessive sun exposure/tanning: YES  Sunscreen used: YES   History:           Any previous history of skin cancer: YES- [place] cell Lip, 2017. Basal cell cheek on, 2021  Any family history of melanoma: no  Previous episodes of similar lesion: YES  Precipitating or alleviating factors: none  Therapies tried and outcome: lotion    Diabetes Follow-up    How often are you checking your blood sugar? A few times a week  What time of day are you checking your blood sugars (select all that apply)?  Before and after meals  Have you had any blood sugars above 200?  No  Have you had any blood sugars below 70?  No    What symptoms do you notice when your blood sugar is low?  None    What concerns do you have today about your diabetes? None     Do you have any of these symptoms? (Select all that apply)  No numbness or tingling in feet.  No redness, sores or blisters on feet.  No complaints of excessive thirst.  No reports of blurry vision.  No significant changes to weight.    Have you had a diabetic eye exam in the last 12 months? No - Patient going to [place] 3/8/2022        BP Readings from Last 2  Encounters:   02/15/22 112/60   01/12/22 120/70     Hemoglobin A1C POCT (%)   Date Value   06/01/2021 6.7 (H)   09/12/2019 6.7 (H)     LDL Cholesterol Calculated (mg/dL)   Date Value   06/01/2021 78   09/12/2019 87                 How many servings of fruits and vegetables do you eat daily?  2-3    On average, how many sweetened beverages do you drink each day (Examples: soda, juice, sweet tea, etc.  Do NOT count diet or artificially sweetened beverages)?   0    How many days per week do you exercise enough to make your heart beat faster? 3 or less    How many minutes a day do you exercise enough to make your heart beat faster? 9 or less    How many days per week do you miss taking your medication? 0      Review of Systems   Constitutional, HEENT, cardiovascular, pulmonary, gi and gu systems are negative, except as otherwise noted.      Objective    /60   Pulse 64   Temp 97  F (36.1  C)   Resp 18   Wt 86.2 kg (190 lb)   SpO2 99%   BMI 30.67 kg/m    Body mass index is 30.67 kg/m .  Physical Exam   GENERAL: healthy, alert and no distress  EYES: Eyes grossly normal to inspection, PERRL and conjunctivae and sclerae normal  HENT: ear canals and TM's normal, nose and mouth without ulcers or lesions  NECK: no adenopathy, no asymmetry, masses, or scars and thyroid normal to palpation  RESP: lungs clear to auscultation - no rales, rhonchi or wheezes  CV: regular rate and rhythm, normal S1 S2, no S3 or S4, no murmur, click or rub, no peripheral edema and peripheral pulses strong  ABDOMEN: soft, nontender, no hepatosplenomegaly, no masses and bowel sounds normal  MS: no gross musculoskeletal defects noted, no edema  SKIN: Solitary lesion consistent with AK on mid back, multiple scaly lesions scattered on forearm bilaterally.    NEURO: Normal strength and tone, mentation intact and speech normal  PSYCH: mentation appears normal, affect normal/bright

## 2022-02-15 ENCOUNTER — OFFICE VISIT (OUTPATIENT)
Dept: FAMILY MEDICINE | Facility: OTHER | Age: 67
End: 2022-02-15
Attending: STUDENT IN AN ORGANIZED HEALTH CARE EDUCATION/TRAINING PROGRAM
Payer: MEDICARE

## 2022-02-15 ENCOUNTER — HOSPITAL ENCOUNTER (OUTPATIENT)
Dept: ULTRASOUND IMAGING | Facility: HOSPITAL | Age: 67
End: 2022-02-15
Attending: STUDENT IN AN ORGANIZED HEALTH CARE EDUCATION/TRAINING PROGRAM
Payer: MEDICARE

## 2022-02-15 VITALS
TEMPERATURE: 97 F | WEIGHT: 190 LBS | SYSTOLIC BLOOD PRESSURE: 112 MMHG | RESPIRATION RATE: 18 BRPM | BODY MASS INDEX: 30.67 KG/M2 | OXYGEN SATURATION: 99 % | DIASTOLIC BLOOD PRESSURE: 60 MMHG | HEART RATE: 64 BPM

## 2022-02-15 DIAGNOSIS — L57.0 ACTINIC KERATOSIS: Primary | ICD-10-CM

## 2022-02-15 DIAGNOSIS — Z87.891 HISTORY OF SMOKING 10-25 PACK YEARS: ICD-10-CM

## 2022-02-15 DIAGNOSIS — I10 BENIGN ESSENTIAL HYPERTENSION: ICD-10-CM

## 2022-02-15 DIAGNOSIS — E11.9 TYPE 2 DIABETES MELLITUS WITHOUT COMPLICATION, WITHOUT LONG-TERM CURRENT USE OF INSULIN (H): ICD-10-CM

## 2022-02-15 LAB
CREAT UR-MCNC: 238 MG/DL
EST. AVERAGE GLUCOSE BLD GHB EST-MCNC: 174 MG/DL
HBA1C MFR BLD: 7.7 % (ref 0–5.6)
MICROALBUMIN UR-MCNC: 24 MG/L
MICROALBUMIN/CREAT UR: 10.08 MG/G CR (ref 0–17)

## 2022-02-15 PROCEDURE — 99213 OFFICE O/P EST LOW 20 MIN: CPT | Performed by: STUDENT IN AN ORGANIZED HEALTH CARE EDUCATION/TRAINING PROGRAM

## 2022-02-15 PROCEDURE — G0463 HOSPITAL OUTPT CLINIC VISIT: HCPCS

## 2022-02-15 PROCEDURE — 82043 UR ALBUMIN QUANTITATIVE: CPT | Mod: ZL | Performed by: STUDENT IN AN ORGANIZED HEALTH CARE EDUCATION/TRAINING PROGRAM

## 2022-02-15 PROCEDURE — G0463 HOSPITAL OUTPT CLINIC VISIT: HCPCS | Mod: 25

## 2022-02-15 PROCEDURE — 36415 COLL VENOUS BLD VENIPUNCTURE: CPT | Mod: ZL | Performed by: STUDENT IN AN ORGANIZED HEALTH CARE EDUCATION/TRAINING PROGRAM

## 2022-02-15 PROCEDURE — 83036 HEMOGLOBIN GLYCOSYLATED A1C: CPT | Mod: ZL | Performed by: STUDENT IN AN ORGANIZED HEALTH CARE EDUCATION/TRAINING PROGRAM

## 2022-02-15 PROCEDURE — 76706 US ABDL AORTA SCREEN AAA: CPT

## 2022-02-15 ASSESSMENT — PAIN SCALES - GENERAL: PAINLEVEL: NO PAIN (0)

## 2022-02-15 NOTE — NURSING NOTE
"Chief Complaint   Patient presents with     Diabetes     Derm Problem       Initial /60   Pulse 64   Temp 97  F (36.1  C)   Resp 18   Wt 86.2 kg (190 lb)   SpO2 99%   BMI 30.67 kg/m   Estimated body mass index is 30.67 kg/m  as calculated from the following:    Height as of 1/12/22: 1.676 m (5' 6\").    Weight as of this encounter: 86.2 kg (190 lb).  Medication Reconciliation: renetta Dela Cruz  "

## 2022-03-08 ENCOUNTER — TRANSFERRED RECORDS (OUTPATIENT)
Dept: HEALTH INFORMATION MANAGEMENT | Facility: CLINIC | Age: 67
End: 2022-03-08

## 2022-03-08 LAB — RETINOPATHY: NEGATIVE

## 2022-03-11 DIAGNOSIS — E78.5 ELEVATED LIPIDS: ICD-10-CM

## 2022-03-11 DIAGNOSIS — I10 ESSENTIAL HYPERTENSION: ICD-10-CM

## 2022-03-11 DIAGNOSIS — F41.9 ANXIETY: ICD-10-CM

## 2022-03-11 DIAGNOSIS — E11.9 CONTROLLED TYPE 2 DIABETES MELLITUS WITHOUT COMPLICATION, WITHOUT LONG-TERM CURRENT USE OF INSULIN (H): ICD-10-CM

## 2022-03-11 RX ORDER — LOVASTATIN 20 MG
TABLET ORAL
Qty: 90 TABLET | Refills: 3 | Status: SHIPPED | OUTPATIENT
Start: 2022-03-11 | End: 2023-03-06

## 2022-03-11 RX ORDER — SERTRALINE HYDROCHLORIDE 100 MG/1
100 TABLET, FILM COATED ORAL DAILY
Qty: 90 TABLET | Refills: 3 | Status: SHIPPED | OUTPATIENT
Start: 2022-03-11 | End: 2023-03-06

## 2022-03-11 RX ORDER — LISINOPRIL 10 MG/1
10 TABLET ORAL DAILY
Qty: 90 TABLET | Refills: 3 | Status: SHIPPED | OUTPATIENT
Start: 2022-03-11 | End: 2023-03-06

## 2022-05-30 NOTE — PROGRESS NOTES
Assessment & Plan     Type 2 diabetes mellitus without complication, without long-term current use of insulin (H)  History of diabetes, due for routine labs  - Lipid Profile (Chol, Trig, HDL, LDL calc); Future  - Comprehensive metabolic panel; Future  - Hemoglobin A1c; Future  - Lipid Profile (Chol, Trig, HDL, LDL calc)  - Comprehensive metabolic panel  - Hemoglobin A1c    Actinic keratosis  Has scattered AK lesions.  Would also benefit from a thorough head to toe examination by dermatology  - Adult Dermatology Referral; Future    Eczematoid otitis externa of right ear, unspecified chronicity  Mild scale of right ear canal, appears to be eczematous   - fluocinolone acetonide 0.01 % OIL; 5 drops to right ear twice daily for 7-14 days      No follow-ups on file.    Sandra Robles MD  [facility] - [place] Cavazos is a 66 year old who presents to clinic for follow-up    HPI     Diabetes Follow-up    How often are you checking your blood sugar? A few times a week  What time of day are you checking your blood sugars (select all that apply)?  Before and after meals  Have you had any blood sugars above 200?  No  Have you had any blood sugars below 70?  No    What symptoms do you notice when your blood sugar is low?  None    What concerns do you have today about your diabetes? None     Do you have any of these symptoms? (Select all that apply)  No numbness or tingling in feet.  No redness, sores or blisters on feet.  No complaints of excessive thirst.  No reports of blurry vision.  No significant changes to weight.      BP Readings from Last 2 Encounters:   02/15/22 112/60   01/12/22 120/70     Hemoglobin A1C POCT (%)   Date Value   06/01/2021 6.7 (H)   09/12/2019 6.7 (H)     Hemoglobin A1C (%)   Date Value   02/15/2022 7.7 (H)     LDL Cholesterol Calculated (mg/dL)   Date Value   06/01/2021 78   09/12/2019 87       Hyperlipidemia Follow-Up      Are you regularly taking any medication or  supplement to lower your cholesterol?   Yes- Lovastatin    Are you having muscle aches or other side effects that you think could be caused by your cholesterol lowering medication?  No    Depression Followup    How are you doing with your depression since your last visit? No change    Are you having other symptoms that might be associated with depression? No    Have you had a significant life event?  No     Are you feeling anxious or having panic attacks?   No    Do you have any concerns with your use of alcohol or other drugs? No    Social History     Tobacco Use     Smoking status: Former Smoker     Packs/day: 1.50     Years: 15.00     Pack years: 22.50     Quit date:      Years since quittin.4     Smokeless tobacco: Never Used   Substance Use Topics     Alcohol use: Yes     Comment: 12/week     Drug use: No     PHQ 2021   PHQ-9 Total Score 3 4 4   Q9: Thoughts of better off dead/self-harm past 2 weeks Not at all Not at all Not at all     MARTY-7 SCORE 2021   Total Score 0 0 1     Last PHQ-9 2022   1.  Little interest or pleasure in doing things 1   2.  Feeling down, depressed, or hopeless 1   3.  Trouble falling or staying asleep, or sleeping too much 1   4.  Feeling tired or having little energy 1   5.  Poor appetite or overeating 0   6.  Feeling bad about yourself 0   7.  Trouble concentrating 0   8.  Moving slowly or restless 0   Q9: Thoughts of better off dead/self-harm past 2 weeks 0   PHQ-9 Total Score 4   Difficulty at work, home, or with people Somewhat difficult     MARTY-7  2022   1. Feeling nervous, anxious, or on edge 0   2. Not being able to stop or control worrying 0   3. Worrying too much about different things 0   4. Trouble relaxing 0   5. Being so restless that it is hard to sit still 0   6. Becoming easily annoyed or irritable 1   7. Feeling afraid, as if something awful might happen 0   MARTY-7 Total Score 1   If you checked any  "problems, how difficult have they made it for you to do your work, take care of things at home, or get along with other people? Not difficult at all         Review of Systems   Constitutional, HEENT, cardiovascular, pulmonary, gi and gu systems are negative, except as otherwise noted.      Objective    /65   Pulse 65   Temp 97.5  F (36.4  C) (Tympanic)   Resp 16   Ht 1.676 m (5' 6\")   Wt 84.8 kg (187 lb)   SpO2 98%   BMI 30.18 kg/m    Body mass index is 30.18 kg/m .  Physical Exam   GENERAL: healthy, alert and no distress  EYES: Eyes grossly normal to inspection, PERRL and conjunctivae and sclerae normal  HENT: ear canals and TM's normal, nose and mouth without ulcers or lesions  NECK: no adenopathy, no asymmetry, masses, or scars and thyroid normal to palpation  RESP: lungs clear to auscultation - no rales, rhonchi or wheezes  CV: regular rate and rhythm, normal S1 S2, no S3 or S4, no murmur, click or rub, no peripheral edema and peripheral pulses strong  ABDOMEN: soft, nontender, no hepatosplenomegaly, no masses and bowel sounds normal  MS: no gross musculoskeletal defects noted, no edema  SKIN: AK on bilateral forearms, mild scale of right ear canal  NEURO: Normal strength and tone, mentation intact and speech normal  PSYCH: mentation appears normal, affect normal/bright      "

## 2022-06-07 ENCOUNTER — OFFICE VISIT (OUTPATIENT)
Dept: FAMILY MEDICINE | Facility: OTHER | Age: 67
End: 2022-06-07
Attending: STUDENT IN AN ORGANIZED HEALTH CARE EDUCATION/TRAINING PROGRAM
Payer: MEDICARE

## 2022-06-07 VITALS
TEMPERATURE: 97.5 F | DIASTOLIC BLOOD PRESSURE: 65 MMHG | HEART RATE: 65 BPM | HEIGHT: 66 IN | RESPIRATION RATE: 16 BRPM | OXYGEN SATURATION: 98 % | SYSTOLIC BLOOD PRESSURE: 120 MMHG | WEIGHT: 187 LBS | BODY MASS INDEX: 30.05 KG/M2

## 2022-06-07 DIAGNOSIS — H60.541 ECZEMATOID OTITIS EXTERNA OF RIGHT EAR, UNSPECIFIED CHRONICITY: ICD-10-CM

## 2022-06-07 DIAGNOSIS — L57.0 ACTINIC KERATOSIS: ICD-10-CM

## 2022-06-07 DIAGNOSIS — E11.9 TYPE 2 DIABETES MELLITUS WITHOUT COMPLICATION, WITHOUT LONG-TERM CURRENT USE OF INSULIN (H): Primary | ICD-10-CM

## 2022-06-07 LAB
ALBUMIN SERPL-MCNC: 4.5 G/DL (ref 3.4–5)
ALP SERPL-CCNC: 51 U/L (ref 40–150)
ALT SERPL W P-5'-P-CCNC: 32 U/L (ref 0–70)
ANION GAP SERPL CALCULATED.3IONS-SCNC: 5 MMOL/L (ref 3–14)
AST SERPL W P-5'-P-CCNC: 36 U/L (ref 0–45)
BILIRUB SERPL-MCNC: 1.1 MG/DL (ref 0.2–1.3)
BUN SERPL-MCNC: 17 MG/DL (ref 7–30)
CALCIUM SERPL-MCNC: 9.3 MG/DL (ref 8.5–10.1)
CHLORIDE BLD-SCNC: 104 MMOL/L (ref 94–109)
CHOLEST SERPL-MCNC: 165 MG/DL
CO2 SERPL-SCNC: 27 MMOL/L (ref 20–32)
CREAT SERPL-MCNC: 0.99 MG/DL (ref 0.66–1.25)
EST. AVERAGE GLUCOSE BLD GHB EST-MCNC: 120 MG/DL
FASTING STATUS PATIENT QL REPORTED: YES
GFR SERPL CREATININE-BSD FRML MDRD: 85 ML/MIN/1.73M2
GLUCOSE BLD-MCNC: 136 MG/DL (ref 70–99)
HBA1C MFR BLD: 5.8 % (ref 0–5.6)
HDLC SERPL-MCNC: 66 MG/DL
LDLC SERPL CALC-MCNC: 65 MG/DL
NONHDLC SERPL-MCNC: 99 MG/DL
POTASSIUM BLD-SCNC: 4.2 MMOL/L (ref 3.4–5.3)
PROT SERPL-MCNC: 8 G/DL (ref 6.8–8.8)
SODIUM SERPL-SCNC: 136 MMOL/L (ref 133–144)
TRIGL SERPL-MCNC: 171 MG/DL

## 2022-06-07 PROCEDURE — 80053 COMPREHEN METABOLIC PANEL: CPT | Mod: ZL | Performed by: STUDENT IN AN ORGANIZED HEALTH CARE EDUCATION/TRAINING PROGRAM

## 2022-06-07 PROCEDURE — 80061 LIPID PANEL: CPT | Mod: ZL | Performed by: STUDENT IN AN ORGANIZED HEALTH CARE EDUCATION/TRAINING PROGRAM

## 2022-06-07 PROCEDURE — 99213 OFFICE O/P EST LOW 20 MIN: CPT | Performed by: STUDENT IN AN ORGANIZED HEALTH CARE EDUCATION/TRAINING PROGRAM

## 2022-06-07 PROCEDURE — 36415 COLL VENOUS BLD VENIPUNCTURE: CPT | Mod: ZL | Performed by: STUDENT IN AN ORGANIZED HEALTH CARE EDUCATION/TRAINING PROGRAM

## 2022-06-07 PROCEDURE — 83036 HEMOGLOBIN GLYCOSYLATED A1C: CPT | Mod: ZL | Performed by: STUDENT IN AN ORGANIZED HEALTH CARE EDUCATION/TRAINING PROGRAM

## 2022-06-07 RX ORDER — FLUOCINOLONE ACETONIDE 0.11 MG/ML
OIL AURICULAR (OTIC)
Qty: 20 ML | Refills: 0 | Status: SHIPPED | OUTPATIENT
Start: 2022-06-07 | End: 2024-07-08

## 2022-06-07 ASSESSMENT — PATIENT HEALTH QUESTIONNAIRE - PHQ9: SUM OF ALL RESPONSES TO PHQ QUESTIONS 1-9: 4

## 2022-06-07 ASSESSMENT — ANXIETY QUESTIONNAIRES
6. BECOMING EASILY ANNOYED OR IRRITABLE: SEVERAL DAYS
5. BEING SO RESTLESS THAT IT IS HARD TO SIT STILL: NOT AT ALL
GAD7 TOTAL SCORE: 1
IF YOU CHECKED OFF ANY PROBLEMS ON THIS QUESTIONNAIRE, HOW DIFFICULT HAVE THESE PROBLEMS MADE IT FOR YOU TO DO YOUR WORK, TAKE CARE OF THINGS AT HOME, OR GET ALONG WITH OTHER PEOPLE: NOT DIFFICULT AT ALL
7. FEELING AFRAID AS IF SOMETHING AWFUL MIGHT HAPPEN: NOT AT ALL
2. NOT BEING ABLE TO STOP OR CONTROL WORRYING: NOT AT ALL
4. TROUBLE RELAXING: NOT AT ALL
3. WORRYING TOO MUCH ABOUT DIFFERENT THINGS: NOT AT ALL
1. FEELING NERVOUS, ANXIOUS, OR ON EDGE: NOT AT ALL
GAD7 TOTAL SCORE: 1

## 2022-06-07 ASSESSMENT — PAIN SCALES - GENERAL: PAINLEVEL: NO PAIN (0)

## 2022-06-07 NOTE — NURSING NOTE
"Chief Complaint   Patient presents with     Diabetes     Lipids       Initial /65   Pulse 65   Temp 97.5  F (36.4  C) (Tympanic)   Resp 16   Ht 1.676 m (5' 6\")   Wt 84.8 kg (187 lb)   SpO2 98%   BMI 30.18 kg/m   Estimated body mass index is 30.18 kg/m  as calculated from the following:    Height as of this encounter: 1.676 m (5' 6\").    Weight as of this encounter: 84.8 kg (187 lb).  Medication Reconciliation: complete  Elaine Weller LPN    "

## 2022-06-13 ENCOUNTER — MYC MEDICAL ADVICE (OUTPATIENT)
Dept: DERMATOLOGY | Facility: OTHER | Age: 67
End: 2022-06-13

## 2022-09-04 ENCOUNTER — HEALTH MAINTENANCE LETTER (OUTPATIENT)
Age: 67
End: 2022-09-04

## 2022-09-15 DIAGNOSIS — M10.9 ACUTE GOUTY ARTHRITIS: ICD-10-CM

## 2022-09-15 RX ORDER — INDOMETHACIN 50 MG/1
50 CAPSULE ORAL 3 TIMES DAILY PRN
Qty: 30 CAPSULE | Refills: 3 | OUTPATIENT
Start: 2022-09-15

## 2022-09-15 NOTE — TELEPHONE ENCOUNTER
Patient called for refills, states he has not had them for a while.          indomethacin (INDOCIN) 50 MG capsule      Last Written Prescription Date:  9/27/18  Last Fill Quantity: 30,   # refills: 3  Last Office Visit: 6/7/22  Future Office visit:       Routing refill request to provider for review/approval because:  Drug not on the [facility], [facility] or [facility] refill protocol or controlled substance    Also requesting med below.  Not on current med list    sildenafil (VIAGRA) 100 MG tablet (Discontinued)      Last Written Prescription Date:  10/12/16-6/1/21  Last Fill Quantity: ,   # refills:   Last Office Visit: 6/7/22  Future Office visit:       Routing refill request to provider for review/approval because:  Drug not on the [facility], [facility] or [facility] refill protocol or controlled substance

## 2022-09-24 DIAGNOSIS — M25.50 MULTIPLE JOINT PAIN: Primary | ICD-10-CM

## 2022-09-24 RX ORDER — INDOMETHACIN 50 MG/1
50 CAPSULE ORAL 3 TIMES DAILY PRN
Qty: 30 CAPSULE | Refills: 3 | Status: SHIPPED | OUTPATIENT
Start: 2022-09-24

## 2022-09-27 DIAGNOSIS — N52.9 IMPOTENCE OF ORGANIC ORIGIN: Primary | ICD-10-CM

## 2022-09-28 RX ORDER — SILDENAFIL 100 MG/1
100 TABLET, FILM COATED ORAL DAILY PRN
Qty: 30 TABLET | Refills: 1 | Status: SHIPPED | OUTPATIENT
Start: 2022-09-28 | End: 2024-07-08

## 2023-01-15 ENCOUNTER — HEALTH MAINTENANCE LETTER (OUTPATIENT)
Age: 68
End: 2023-01-15

## 2023-03-03 DIAGNOSIS — E11.9 CONTROLLED TYPE 2 DIABETES MELLITUS WITHOUT COMPLICATION, WITHOUT LONG-TERM CURRENT USE OF INSULIN (H): ICD-10-CM

## 2023-03-03 DIAGNOSIS — I10 ESSENTIAL HYPERTENSION: ICD-10-CM

## 2023-03-03 DIAGNOSIS — F41.9 ANXIETY: ICD-10-CM

## 2023-03-03 DIAGNOSIS — E78.5 ELEVATED LIPIDS: ICD-10-CM

## 2023-03-06 RX ORDER — LOVASTATIN 20 MG
TABLET ORAL
Qty: 90 TABLET | Refills: 3 | Status: SHIPPED | OUTPATIENT
Start: 2023-03-06 | End: 2024-03-12

## 2023-03-06 RX ORDER — SERTRALINE HYDROCHLORIDE 100 MG/1
TABLET, FILM COATED ORAL
Qty: 90 TABLET | Refills: 3 | Status: SHIPPED | OUTPATIENT
Start: 2023-03-06 | End: 2024-03-12

## 2023-03-06 RX ORDER — LISINOPRIL 10 MG/1
10 TABLET ORAL DAILY
Qty: 90 TABLET | Refills: 3 | Status: SHIPPED | OUTPATIENT
Start: 2023-03-06 | End: 2024-03-12

## 2023-03-06 NOTE — TELEPHONE ENCOUNTER
Lisinopril      Last Written Prescription Date:  3/11/22  Last Fill Quantity: 90,   # refills: 3  Last Office Visit: 6/7/22  Future Office visit:       Lovastatin      Last Written Prescription Date:  3/11/22  Last Fill Quantity: 90,   # refills: 3  Last Office Visit: 6/7/22  Future Office visit:       Metformin      Last Written Prescription Date:  3/11/22  Last Fill Quantity: 180,   # refills: 3  Last Office Visit: 6/7/22  Future Office visit:       Sertraline      Last Written Prescription Date:  3/11/22  Last Fill Quantity: 90,   # refills: 3  Last Office Visit: 6/7/22  Future Office visit:

## 2023-07-23 ENCOUNTER — HEALTH MAINTENANCE LETTER (OUTPATIENT)
Age: 68
End: 2023-07-23

## 2023-11-04 ENCOUNTER — TRANSFERRED RECORDS (OUTPATIENT)
Dept: MULTI SPECIALTY CLINIC | Facility: CLINIC | Age: 68
End: 2023-11-04

## 2023-11-04 LAB — RETINOPATHY: NORMAL

## 2023-11-14 ENCOUNTER — TRANSFERRED RECORDS (OUTPATIENT)
Dept: HEALTH INFORMATION MANAGEMENT | Facility: CLINIC | Age: 68
End: 2023-11-14

## 2023-11-14 LAB — RETINOPATHY: NEGATIVE

## 2023-11-27 ASSESSMENT — ENCOUNTER SYMPTOMS
PALPITATIONS: 0
NERVOUS/ANXIOUS: 0
CHILLS: 0
MYALGIAS: 1
PARESTHESIAS: 0
WEAKNESS: 0
ARTHRALGIAS: 1
FREQUENCY: 0
COUGH: 0
DIZZINESS: 0
JOINT SWELLING: 0
EYE PAIN: 0
SHORTNESS OF BREATH: 0
HEARTBURN: 0
SORE THROAT: 0
HEMATURIA: 0
ABDOMINAL PAIN: 0
DYSURIA: 0
HEMATOCHEZIA: 0
HEADACHES: 0
DIARRHEA: 1
NAUSEA: 0
CONSTIPATION: 1
FEVER: 0

## 2023-11-27 ASSESSMENT — ACTIVITIES OF DAILY LIVING (ADL): CURRENT_FUNCTION: NO ASSISTANCE NEEDED

## 2023-12-04 ENCOUNTER — OFFICE VISIT (OUTPATIENT)
Dept: FAMILY MEDICINE | Facility: OTHER | Age: 68
End: 2023-12-04
Attending: STUDENT IN AN ORGANIZED HEALTH CARE EDUCATION/TRAINING PROGRAM
Payer: MEDICARE

## 2023-12-04 VITALS
OXYGEN SATURATION: 98 % | BODY MASS INDEX: 32.38 KG/M2 | DIASTOLIC BLOOD PRESSURE: 70 MMHG | TEMPERATURE: 97.6 F | HEART RATE: 58 BPM | SYSTOLIC BLOOD PRESSURE: 120 MMHG | HEIGHT: 66 IN | WEIGHT: 201.5 LBS

## 2023-12-04 DIAGNOSIS — L57.0 ACTINIC KERATOSIS: ICD-10-CM

## 2023-12-04 DIAGNOSIS — Z12.5 SCREENING FOR PROSTATE CANCER: ICD-10-CM

## 2023-12-04 DIAGNOSIS — E11.9 CONTROLLED TYPE 2 DIABETES MELLITUS WITHOUT COMPLICATION, WITHOUT LONG-TERM CURRENT USE OF INSULIN (H): Primary | ICD-10-CM

## 2023-12-04 LAB
ALBUMIN SERPL BCG-MCNC: 4.6 G/DL (ref 3.5–5.2)
ALP SERPL-CCNC: 48 U/L (ref 40–150)
ALT SERPL W P-5'-P-CCNC: 29 U/L (ref 0–70)
ANION GAP SERPL CALCULATED.3IONS-SCNC: 11 MMOL/L (ref 7–15)
AST SERPL W P-5'-P-CCNC: 43 U/L (ref 0–45)
BILIRUB SERPL-MCNC: 0.8 MG/DL
BUN SERPL-MCNC: 27.6 MG/DL (ref 8–23)
CALCIUM SERPL-MCNC: 9.5 MG/DL (ref 8.8–10.2)
CHLORIDE SERPL-SCNC: 100 MMOL/L (ref 98–107)
CHOLEST SERPL-MCNC: 160 MG/DL
CREAT SERPL-MCNC: 1.06 MG/DL (ref 0.67–1.17)
CREAT UR-MCNC: 65.6 MG/DL
DEPRECATED HCO3 PLAS-SCNC: 26 MMOL/L (ref 22–29)
EGFRCR SERPLBLD CKD-EPI 2021: 77 ML/MIN/1.73M2
EST. AVERAGE GLUCOSE BLD GHB EST-MCNC: 166 MG/DL
GLUCOSE SERPL-MCNC: 137 MG/DL (ref 70–99)
HBA1C MFR BLD: 7.4 %
HDLC SERPL-MCNC: 49 MG/DL
LDLC SERPL CALC-MCNC: 73 MG/DL
MICROALBUMIN UR-MCNC: <12 MG/L
MICROALBUMIN/CREAT UR: NORMAL MG/G{CREAT}
NONHDLC SERPL-MCNC: 111 MG/DL
POTASSIUM SERPL-SCNC: 4.1 MMOL/L (ref 3.4–5.3)
PROT SERPL-MCNC: 7.2 G/DL (ref 6.4–8.3)
PSA SERPL DL<=0.01 NG/ML-MCNC: 1.15 NG/ML (ref 0–4.5)
SODIUM SERPL-SCNC: 137 MMOL/L (ref 135–145)
TRIGL SERPL-MCNC: 192 MG/DL

## 2023-12-04 PROCEDURE — 80053 COMPREHEN METABOLIC PANEL: CPT | Mod: ZL | Performed by: STUDENT IN AN ORGANIZED HEALTH CARE EDUCATION/TRAINING PROGRAM

## 2023-12-04 PROCEDURE — 90677 PCV20 VACCINE IM: CPT

## 2023-12-04 PROCEDURE — G0439 PPPS, SUBSEQ VISIT: HCPCS | Performed by: STUDENT IN AN ORGANIZED HEALTH CARE EDUCATION/TRAINING PROGRAM

## 2023-12-04 PROCEDURE — 99207 ZZC FOOT EXAM  NO CHARGE: CPT | Performed by: STUDENT IN AN ORGANIZED HEALTH CARE EDUCATION/TRAINING PROGRAM

## 2023-12-04 PROCEDURE — 82570 ASSAY OF URINE CREATININE: CPT | Mod: ZL | Performed by: STUDENT IN AN ORGANIZED HEALTH CARE EDUCATION/TRAINING PROGRAM

## 2023-12-04 PROCEDURE — G0463 HOSPITAL OUTPT CLINIC VISIT: HCPCS

## 2023-12-04 PROCEDURE — 83036 HEMOGLOBIN GLYCOSYLATED A1C: CPT | Mod: ZL | Performed by: STUDENT IN AN ORGANIZED HEALTH CARE EDUCATION/TRAINING PROGRAM

## 2023-12-04 PROCEDURE — 36415 COLL VENOUS BLD VENIPUNCTURE: CPT | Mod: ZL | Performed by: STUDENT IN AN ORGANIZED HEALTH CARE EDUCATION/TRAINING PROGRAM

## 2023-12-04 PROCEDURE — G0103 PSA SCREENING: HCPCS | Mod: ZL | Performed by: STUDENT IN AN ORGANIZED HEALTH CARE EDUCATION/TRAINING PROGRAM

## 2023-12-04 PROCEDURE — 80061 LIPID PANEL: CPT | Mod: ZL | Performed by: STUDENT IN AN ORGANIZED HEALTH CARE EDUCATION/TRAINING PROGRAM

## 2023-12-04 RX ORDER — IMIQUIMOD 12.5 MG/.25G
CREAM TOPICAL
Qty: 12 PACKET | Refills: 3 | Status: SHIPPED | OUTPATIENT
Start: 2023-12-04

## 2023-12-04 RX ORDER — IMIQUIMOD 12.5 MG/.25G
CREAM TOPICAL
Qty: 12 PACKET | Refills: 3 | Status: SHIPPED | OUTPATIENT
Start: 2023-12-04 | End: 2023-12-04

## 2023-12-04 ASSESSMENT — ANXIETY QUESTIONNAIRES
5. BEING SO RESTLESS THAT IT IS HARD TO SIT STILL: NOT AT ALL
4. TROUBLE RELAXING: NOT AT ALL
7. FEELING AFRAID AS IF SOMETHING AWFUL MIGHT HAPPEN: NOT AT ALL
3. WORRYING TOO MUCH ABOUT DIFFERENT THINGS: NOT AT ALL
1. FEELING NERVOUS, ANXIOUS, OR ON EDGE: NOT AT ALL
2. NOT BEING ABLE TO STOP OR CONTROL WORRYING: NOT AT ALL
6. BECOMING EASILY ANNOYED OR IRRITABLE: SEVERAL DAYS
GAD7 TOTAL SCORE: 1
GAD7 TOTAL SCORE: 1
IF YOU CHECKED OFF ANY PROBLEMS ON THIS QUESTIONNAIRE, HOW DIFFICULT HAVE THESE PROBLEMS MADE IT FOR YOU TO DO YOUR WORK, TAKE CARE OF THINGS AT HOME, OR GET ALONG WITH OTHER PEOPLE: NOT DIFFICULT AT ALL

## 2023-12-04 ASSESSMENT — ENCOUNTER SYMPTOMS
NERVOUS/ANXIOUS: 0
HEMATOCHEZIA: 0
ABDOMINAL PAIN: 0
WEAKNESS: 0
CONSTIPATION: 1
DIARRHEA: 1
DIZZINESS: 0
SORE THROAT: 0
DYSURIA: 0
EYE PAIN: 0
CHILLS: 0
FREQUENCY: 0
PALPITATIONS: 0
JOINT SWELLING: 0
SHORTNESS OF BREATH: 0
PARESTHESIAS: 0
NAUSEA: 0
HEADACHES: 0
HEMATURIA: 0
FEVER: 0
HEARTBURN: 0
COUGH: 0
ARTHRALGIAS: 1
MYALGIAS: 1

## 2023-12-04 ASSESSMENT — ACTIVITIES OF DAILY LIVING (ADL): CURRENT_FUNCTION: NO ASSISTANCE NEEDED

## 2023-12-04 ASSESSMENT — PATIENT HEALTH QUESTIONNAIRE - PHQ9
SUM OF ALL RESPONSES TO PHQ QUESTIONS 1-9: 10
10. IF YOU CHECKED OFF ANY PROBLEMS, HOW DIFFICULT HAVE THESE PROBLEMS MADE IT FOR YOU TO DO YOUR WORK, TAKE CARE OF THINGS AT HOME, OR GET ALONG WITH OTHER PEOPLE: SOMEWHAT DIFFICULT
SUM OF ALL RESPONSES TO PHQ QUESTIONS 1-9: 10

## 2023-12-04 ASSESSMENT — PAIN SCALES - GENERAL: PAINLEVEL: MODERATE PAIN (5)

## 2023-12-04 NOTE — PROGRESS NOTES
SUBJECTIVE:   Dirk is a 68 year old, presenting for the following:  Physical        12/4/2023     2:32 PM   Additional Questions   Roomed by Abner Terrazas   Accompanied by Self         12/4/2023     2:32 PM   Patient Reported Additional Medications   Patient reports taking the following new medications none     Are you in the first 12 months of your Medicare coverage?  No    Chronic left shoulder pain.  Has history of left sided rotator cuff pathology.  Surgically operated on 2x.  Dr. Leahy did his surgeries twice.        Diabetes Follow-up    How often are you checking your blood sugar? A few times a month  What time of day are you checking your blood sugars (select all that apply)?   It varies  Have you had any blood sugars above 200?  No  Have you had any blood sugars below 70?  No  What symptoms do you notice when your blood sugar is low?  None  What concerns do you have today about your diabetes? None   Do you have any of these symptoms? (Select all that apply)  No numbness or tingling in feet.  No redness, sores or blisters on feet.  No complaints of excessive thirst.  No reports of blurry vision.  No significant changes to weight.  Have you had a diabetic eye exam in the last 12 months? Yes- Date of last eye exam: 11/4/2023 approximate ,  Location: [facility]       Hyperlipidemia Follow-Up    Are you regularly taking any medication or supplement to lower your cholesterol?   Yes- Lovastatin  Are you having muscle aches or other side effects that you think could be caused by your cholesterol lowering medication?  No    Hypertension Follow-up    Do you check your blood pressure regularly outside of the clinic? No   Are you following a low salt diet? Yes  Are your blood pressures ever more than 140 on the top number (systolic) OR more   than 90 on the bottom number (diastolic), for example 140/90? No    BP Readings from Last 2 Encounters:   12/04/23 120/70   06/07/22 120/65     Hemoglobin A1C (%)   Date  "Value   06/07/2022 5.8 (H)   02/15/2022 7.7 (H)   06/01/2021 6.7 (H)   09/12/2019 6.7 (H)     LDL Cholesterol Calculated (mg/dL)   Date Value   06/07/2022 65   06/01/2021 78   09/12/2019 87       Healthy Habits:     In general, how would you rate your overall health?  Good    Frequency of exercise:  4-5 days/week    Duration of exercise:  45-60 minutes    Do you usually eat at least 4 servings of fruit and vegetables a day, include whole grains    & fiber and avoid regularly eating high fat or \"junk\" foods?  No    Taking medications regularly:  Yes    Medication side effects:  None    Ability to successfully perform activities of daily living:  No assistance needed    Home Safety:  No safety concerns identified    Hearing Impairment:  Difficulty following a conversation in a noisy restaurant or crowded room    In the past 6 months, have you been bothered by leaking of urine?  No    In general, how would you rate your overall mental or emotional health?  Fair    Additional concerns today:  No      Today's PHQ-9 Score:       12/4/2023     2:19 PM   PHQ-9 SCORE   PHQ-9 Total Score MyChart 10 (Moderate depression)   PHQ-9 Total Score 10       Have you ever done Advance Care Planning? (For example, a Health Directive, POLST, or a discussion with a medical provider or your loved ones about your wishes): No, advance care planning information given to patient to review.  Patient declined advance care planning discussion at this time.    Fall risk  Fallen 2 or more times in the past year?: Yes  Any fall with injury in the past year?: No  No serious injuries associated with the falls    Cognitive Screening   1) Repeat 3 items (Leader, Season, Table)    2) Clock draw: NORMAL  3) 3 item recall: Recalls 3 objects  Results: NORMAL clock, 1-2 items recalled: COGNITIVE IMPAIRMENT LESS LIKELY        Do you have sleep apnea, excessive snoring or daytime drowsiness? : Sleep apnea    Reviewed and updated as needed this visit by " clinical staff   Tobacco  Allergies  Meds              Reviewed and updated as needed this visit by Provider                 Social History     Tobacco Use    Smoking status: Former     Packs/day: 1.50     Years: 10.00     Additional pack years: 0.00     Total pack years: 15.00     Types: Cigarettes     Quit date: 1988     Years since quittin.3    Smokeless tobacco: Never   Substance Use Topics    Alcohol use: Yes     Comment: 12/week   =        2023     9:38 AM   Alcohol Use   Prescreen: >3 drinks/day or >7 drinks/week? Yes   AUDIT SCORE  9     Do you have a current opioid prescription? No  Do you use any other controlled substances or medications that are not prescribed by a provider? None      Current providers sharing in care for this patient include:   Patient Care Team:  Sandra Robles MD as PCP - General (Family Medicine)  Sandra Robles MD as Assigned PCP    The following health maintenance items are reviewed in Epic and correct as of today:  Health Maintenance   Topic Date Due    ADVANCE CARE PLANNING  Never done    ZOSTER IMMUNIZATION (1 of 2) Never done    LUNG CANCER SCREENING  Never done    RSV VACCINE (Pregnancy & 60+) (1 - 1-dose 60+ series) Never done    Pneumococcal Vaccine: 65+ Years (2 - PCV) 2019    MEDICARE ANNUAL WELLNESS VISIT  2021    DIABETIC FOOT EXAM  2022    A1C  2022    MICROALBUMIN  02/15/2023    EYE EXAM  2023    BMP  2023    LIPID  2023    PHQ-9  2024    FALL RISK ASSESSMENT  2024    COLORECTAL CANCER SCREENING  2026    DTAP/TDAP/TD IMMUNIZATION (4 - Td or Tdap) 2028    HEPATITIS C SCREENING  Completed    DEPRESSION ACTION PLAN  Completed    INFLUENZA VACCINE  Completed    AORTIC ANEURYSM SCREENING (SYSTEM ASSIGNED)  Completed    COVID-19 Vaccine  Completed    IPV IMMUNIZATION  Aged Out    HPV IMMUNIZATION  Aged Out    MENINGITIS IMMUNIZATION  Aged Out    RSV MONOCLONAL ANTIBODY  Aged Out  "      Review of Systems   Constitutional:  Negative for chills and fever.   HENT:  Negative for congestion, ear pain, hearing loss and sore throat.    Eyes:  Negative for pain and visual disturbance.   Respiratory:  Negative for cough and shortness of breath.    Cardiovascular:  Negative for chest pain, palpitations and peripheral edema.   Gastrointestinal:  Positive for constipation and diarrhea. Negative for abdominal pain, heartburn, hematochezia and nausea.   Genitourinary:  Negative for dysuria, frequency, genital sores, hematuria, impotence, penile discharge and urgency.   Musculoskeletal:  Positive for arthralgias and myalgias. Negative for joint swelling.   Skin:  Negative for rash.   Neurological:  Negative for dizziness, weakness, headaches and paresthesias.   Psychiatric/Behavioral:  Negative for mood changes. The patient is not nervous/anxious.      Constitutional, HEENT, cardiovascular, pulmonary, GI, , musculoskeletal, neuro, skin, endocrine and psych systems are negative, except as otherwise noted.    OBJECTIVE:   /70 (BP Location: Left arm, Patient Position: Sitting, Cuff Size: Adult Regular)   Pulse 58   Temp 97.6  F (36.4  C) (Tympanic)   Ht 1.68 m (5' 6.14\")   Wt 91.4 kg (201 lb 8 oz)   SpO2 98%   BMI 32.38 kg/m   Estimated body mass index is 32.38 kg/m  as calculated from the following:    Height as of this encounter: 1.68 m (5' 6.14\").    Weight as of this encounter: 91.4 kg (201 lb 8 oz).  Physical Exam  GENERAL: healthy, alert and no distress  EYES: Eyes grossly normal to inspection, PERRL and conjunctivae and sclerae normal  HENT: ear canals and TM's normal, nose and mouth without ulcers or lesions  NECK: no adenopathy, no asymmetry, masses, or scars and thyroid normal to palpation  RESP: lungs clear to auscultation - no rales, rhonchi or wheezes  CV: regular rate and rhythm, normal S1 S2, no S3 or S4, no murmur, click or rub, no peripheral edema and peripheral pulses " strong  ABDOMEN: soft, nontender, no hepatosplenomegaly, no masses and bowel sounds normal  MS: no gross musculoskeletal defects noted, no edema  SKIN: no suspicious lesions or rashes  NEURO: Normal strength and tone, mentation intact and speech normal  PSYCH: mentation appears normal, affect normal/bright      ASSESSMENT / PLAN:       ICD-10-CM    1. Controlled type 2 diabetes mellitus without complication, without long-term current use of insulin (H)  E11.9 Adult Eye  Referral     [facility] FOOT EXAM  NO CHARGE     Hemoglobin A1c     Comprehensive metabolic panel     Lipid Profile (Chol, Trig, HDL, LDL calc)     Albumin Random Urine Quantitative with Creat Ratio     Albumin Random Urine Quantitative with Creat Ratio     Lipid Profile (Chol, Trig, HDL, LDL calc)     Comprehensive metabolic panel     Hemoglobin A1c      2. Screening for prostate cancer  Z12.5 PSA, screen     PSA, screen      3. Actinic keratosis  L57.0 imiquimod (ALDARA) 5 % external cream     DISCONTINUED: imiquimod (ALDARA) 5 % external cream          Patient has been advised of split billing requirements and indicates understanding: Yes      COUNSELING:  Reviewed preventive health counseling, as reflected in patient instructions       Regular exercise       Healthy diet/nutrition       Vision screening       Hearing screening       Dental care       Bladder control       Fall risk prevention        He reports that he quit smoking about 35 years ago. His smoking use included cigarettes. He has a 15.00 pack-year smoking history. He has never used smokeless tobacco.      Appropriate preventive services were discussed with this patient, including applicable screening as appropriate for fall prevention, nutrition, physical activity, Tobacco-use cessation, weight loss and cognition.  Checklist reviewing preventive services available has been given to the patient.    Reviewed patients plan of care and provided an AVS. The Basic Care Plan (routine  screening as documented in Health Maintenance) for Dirk meets the Care Plan requirement. This Care Plan has been established and reviewed with the Patient.          Sandra Robles MD  [facility]    Identified Health Risks:  I have reviewed Opioid Use Disorder and Substance Use Disorder risk factors and made any needed referrals.   Answers submitted by the patient for this visit:  Patient Health Questionnaire (Submitted on 12/4/2023)  If you checked off any problems, how difficult have these problems made it for you to do your work, take care of things at home, or get along with other people?: Somewhat difficult  PHQ9 TOTAL SCORE: 10  MARTY-7 (Submitted on 12/4/2023)  MARTY 7 TOTAL SCORE: 1

## 2024-01-12 ENCOUNTER — TRANSFERRED RECORDS (OUTPATIENT)
Dept: HEALTH INFORMATION MANAGEMENT | Facility: CLINIC | Age: 69
End: 2024-01-12

## 2024-02-05 ENCOUNTER — MYC MEDICAL ADVICE (OUTPATIENT)
Dept: PULMONOLOGY | Facility: OTHER | Age: 69
End: 2024-02-05

## 2024-02-05 ENCOUNTER — OFFICE VISIT (OUTPATIENT)
Dept: FAMILY MEDICINE | Facility: OTHER | Age: 69
End: 2024-02-05
Attending: STUDENT IN AN ORGANIZED HEALTH CARE EDUCATION/TRAINING PROGRAM
Payer: MEDICARE

## 2024-02-05 VITALS
OXYGEN SATURATION: 99 % | WEIGHT: 195.9 LBS | BODY MASS INDEX: 31.48 KG/M2 | TEMPERATURE: 97 F | SYSTOLIC BLOOD PRESSURE: 126 MMHG | DIASTOLIC BLOOD PRESSURE: 68 MMHG | HEART RATE: 62 BPM

## 2024-02-05 DIAGNOSIS — I10 ESSENTIAL HYPERTENSION: ICD-10-CM

## 2024-02-05 DIAGNOSIS — F41.9 ANXIETY: ICD-10-CM

## 2024-02-05 DIAGNOSIS — E11.9 TYPE 2 DIABETES MELLITUS WITHOUT COMPLICATION, WITHOUT LONG-TERM CURRENT USE OF INSULIN (H): ICD-10-CM

## 2024-02-05 DIAGNOSIS — L57.0 ACTINIC KERATOSIS: ICD-10-CM

## 2024-02-05 DIAGNOSIS — E11.9 CONTROLLED TYPE 2 DIABETES MELLITUS WITHOUT COMPLICATION, WITHOUT LONG-TERM CURRENT USE OF INSULIN (H): Primary | ICD-10-CM

## 2024-02-05 DIAGNOSIS — M25.512 CHRONIC LEFT SHOULDER PAIN: ICD-10-CM

## 2024-02-05 DIAGNOSIS — G89.29 CHRONIC LEFT SHOULDER PAIN: ICD-10-CM

## 2024-02-05 DIAGNOSIS — G47.33 OBSTRUCTIVE SLEEP APNEA SYNDROME: ICD-10-CM

## 2024-02-05 PROCEDURE — G0463 HOSPITAL OUTPT CLINIC VISIT: HCPCS

## 2024-02-05 PROCEDURE — 99214 OFFICE O/P EST MOD 30 MIN: CPT | Performed by: STUDENT IN AN ORGANIZED HEALTH CARE EDUCATION/TRAINING PROGRAM

## 2024-02-05 ASSESSMENT — PAIN SCALES - GENERAL: PAINLEVEL: MODERATE PAIN (4)

## 2024-02-05 ASSESSMENT — PATIENT HEALTH QUESTIONNAIRE - PHQ9
SUM OF ALL RESPONSES TO PHQ QUESTIONS 1-9: 5
10. IF YOU CHECKED OFF ANY PROBLEMS, HOW DIFFICULT HAVE THESE PROBLEMS MADE IT FOR YOU TO DO YOUR WORK, TAKE CARE OF THINGS AT HOME, OR GET ALONG WITH OTHER PEOPLE: SOMEWHAT DIFFICULT
SUM OF ALL RESPONSES TO PHQ QUESTIONS 1-9: 5

## 2024-02-05 NOTE — PROGRESS NOTES
"  Assessment & Plan     Type 2 diabetes mellitus without complication, without long-term current use of insulin (H)  His HA1c was 7.4 about 2 months ago.  He is close to goal of 7.  Continue to work on lifestyle and dietary changes.  He has been trying to lose weight and is down by about 6#.  Congratulated him on the effort.  He tells me he is eating less sweets now.    Up to date on eye exam  Up to date on foot exam, no acute concerns  - Hemoglobin A1c; Future    Essential hypertension  BP at goal.  Continue present management.  Weight loss will also help.    He is tolerating medication well with no adverse effects    Anxiety  Lost a fishing friend in December due to death.  He tells me he has been coping well and his anxiety is doing well.      Obstructive sleep apnea syndrome  Using CPAP consistently with good sleep.  He tells me that it has been leaking.  Referral to sleep medicine for replacement parts  - Adult Sleep Eval & Management  Referral; Future    Actinic keratosis  Has finished course of imiquimod and lesions on lip and scalp have resolved.      Chronic left shoulder pain  S/P two rotator cuff surgeries in the past.  He follows with Dr. Stephens and is trying to hold off on a shoulder replacement.  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  His range of motion is limited but he is getting by.  Reporting dull ache, coping well with conservative management.  Could consider trial of PT but per discussion with surgeon, this may make it worse.    25 minutes spent by me on the date of the encounter doing chart review, history and exam, documentation and further activities per the note      BMI  Estimated body mass index is 31.48 kg/m  as calculated from the following:    Height as of 12/4/23: 1.68 m (5' 6.14\").    Weight as of this encounter: 88.9 kg (195 lb 14.4 oz).   Weight management plan: Discussed healthy diet and exercise guidelines  Working toward goal weight of low 180's.  " Walking his dog every day.      Julian Cavazos is a 68 year old, presenting for the following health issues:  Hypertension, Diabetes, Lipids, and Pain        2/5/2024     8:00 AM   Additional Questions   Roomed by Abner Terrazas   Accompanied by Self         2/5/2024     8:00 AM   Patient Reported Additional Medications   Patient reports taking the following new medications none     HPI     Diabetes Follow-up    How often are you checking your blood sugar? A few times a month  What time of day are you checking your blood sugars (select all that apply)?   No specific time   Have you had any blood sugars above 200?  No  Have you had any blood sugars below 70?  No  What symptoms do you notice when your blood sugar is low?  None  What concerns do you have today about your diabetes? None   Do you have any of these symptoms? (Select all that apply)  No numbness or tingling in feet.  No redness, sores or blisters on feet.  No complaints of excessive thirst.  No reports of blurry vision.  No significant changes to weight.      Hyperlipidemia Follow-Up    Are you regularly taking any medication or supplement to lower your cholesterol?   Yes- Lovastatin  Are you having muscle aches or other side effects that you think could be caused by your cholesterol lowering medication?  No    Hypertension Follow-up    Do you check your blood pressure regularly outside of the clinic? No   Are you following a low salt diet? Yes  Are your blood pressures ever more than 140 on the top number (systolic) OR more   than 90 on the bottom number (diastolic), for example 140/90? No    BP Readings from Last 2 Encounters:   02/05/24 126/68   12/04/23 120/70     Hemoglobin A1C (%)   Date Value   12/04/2023 7.4 (H)   06/07/2022 5.8 (H)   06/01/2021 6.7 (H)   09/12/2019 6.7 (H)     LDL Cholesterol Calculated (mg/dL)   Date Value   12/04/2023 73   06/07/2022 65   06/01/2021 78   09/12/2019 87     Pain History:  When did you first notice your pain?  Chronic    Have you seen this provider for your pain in the past? Yes   Where in your body do you have pain? Left shoulder   Are you seeing anyone else for your pain? Yes - Dr. Stephens         6/7/2022    10:00 AM 12/4/2023     2:19 PM 2/5/2024     8:00 AM   PHQ-9 SCORE   PHQ-9 Total Score MyChart  10 (Moderate depression) 5 (Mild depression)   PHQ-9 Total Score 4 10 5           1/12/2022     8:00 AM 6/7/2022    10:00 AM 12/4/2023     2:20 PM   MARTY-7 SCORE   Total Score   1 (minimal anxiety)   Total Score 0 1 1       Chronic Pain Follow Up:    Location of pain: Left shoulder   Analgesia/pain control:    - Recent changes:  None    - Overall control: Tolerable with discomfort    - Current treatments: Ibuprofen PRN   Adherence:     - Do you ever take more pain medicine than prescribed? Yes: Will take 3 Ibuprofen's instead of 2     - When did you take your last dose of pain medicine?  A couple of weeks    Adverse effects: No   PDMP Review       None          :[id number]    Review of Systems  Constitutional, HEENT, cardiovascular, pulmonary, GI, , musculoskeletal, neuro, skin, endocrine and psych systems are negative, except as otherwise noted.      Objective    /68 (BP Location: Left arm, Patient Position: Sitting, Cuff Size: Adult Regular)   Pulse 62   Temp 97  F (36.1  C) (Tympanic)   Wt 88.9 kg (195 lb 14.4 oz)   SpO2 99%   BMI 31.48 kg/m    Body mass index is 31.48 kg/m .  Physical Exam   GENERAL: alert and no distress  EYES: Eyes grossly normal to inspection, PERRL and conjunctivae and sclerae normal  HENT: ear canals and TM's normal, nose and mouth without ulcers or lesions  NECK: no adenopathy, no asymmetry, masses, or scars  RESP: lungs clear to auscultation - no rales, rhonchi or wheezes  CV: regular rate and rhythm, normal S1 S2, no S3 or S4, no murmur, click or rub, no peripheral edema  ABDOMEN: soft, nontender, no hepatosplenomegaly, no masses and bowel sounds normal  MS: no gross musculoskeletal  defects noted, no edema  SKIN: no suspicious lesions or rashes  NEURO: Normal strength and tone, mentation intact and speech normal  PSYCH: mentation appears normal, affect normal/bright  LYMPH: no cervical, supraclavicular, axillary, or inguinal adenopathy       Signed Electronically by: Sandra Robles MD    Answers submitted by the patient for this visit:  Patient Health Questionnaire (Submitted on 2/5/2024)  If you checked off any problems, how difficult have these problems made it for you to do your work, take care of things at home, or get along with other people?: Somewhat difficult  PHQ9 TOTAL SCORE: 5

## 2024-02-10 ASSESSMENT — SLEEP AND FATIGUE QUESTIONNAIRES
HOW LIKELY ARE YOU TO NOD OFF OR FALL ASLEEP WHEN YOU ARE A PASSENGER IN A CAR FOR AN HOUR WITHOUT A BREAK: WOULD NEVER DOZE
HOW LIKELY ARE YOU TO NOD OFF OR FALL ASLEEP WHILE SITTING AND TALKING TO SOMEONE: WOULD NEVER DOZE
HOW LIKELY ARE YOU TO NOD OFF OR FALL ASLEEP WHILE SITTING AND READING: SLIGHT CHANCE OF DOZING
HOW LIKELY ARE YOU TO NOD OFF OR FALL ASLEEP WHILE LYING DOWN TO REST IN THE AFTERNOON WHEN CIRCUMSTANCES PERMIT: SLIGHT CHANCE OF DOZING
HOW LIKELY ARE YOU TO NOD OFF OR FALL ASLEEP WHILE SITTING QUIETLY AFTER LUNCH WITHOUT ALCOHOL: WOULD NEVER DOZE
HOW LIKELY ARE YOU TO NOD OFF OR FALL ASLEEP WHILE WATCHING TV: MODERATE CHANCE OF DOZING
HOW LIKELY ARE YOU TO NOD OFF OR FALL ASLEEP WHILE SITTING INACTIVE IN A PUBLIC PLACE: SLIGHT CHANCE OF DOZING
HOW LIKELY ARE YOU TO NOD OFF OR FALL ASLEEP IN A CAR, WHILE STOPPED FOR A FEW MINUTES IN TRAFFIC: WOULD NEVER DOZE

## 2024-02-14 NOTE — PROGRESS NOTES
02/10/24 1833   Reason For Your Visit   Please briefly describe the main reason(s) for your sleep visit sleep apnea   Approximately when did this problem start 2010   What are your goals for this visit treatment   Time in Bed - Work Or School Days   Do you work or go to school No   What time do you usually get into bed 10 pm   About how long does it take you to fall asleep 20 min   How often do you have trouble falling asleep 2   How often do you wake up during the night varies   What wakes you up at night Pain;External stimuli (bed partner, pets, noise, etc);Use the bathroom   How often do you have trouble falling back to sleep occasionally   About how long does it take to fall back to sleep varies   What do you usually do if you have trouble getting back to sleep lay there   What time do you usually get out of bed to start your day 6:30 am   Do you use an alarm No   Time in Bed - Weekends/Non-work Days/All Other Days   What time do you usually get into bed 10 pm   About how long does it take you to fall asleep 20 min   What time do you usually get out of bed to start your day 6:30 am   Do you use an alarm No   Sleep Need   On average, about how much sleep do you think you get 7 hr   About how much sleep do you think you need ?   Sleep Position   Which sleep positions do you prefer Side   Do you do any of the following activities in bed Other   If other, what none   How often do you take a nap on purpose 0   About how long are your naps 0   Do you feel better after naps No   How often do you doze off unintentionally 2   Have you ever had a driving accident or near-miss due to sleepiness/drowsiness No   Sleep Disruptions - Breathing/Snoring   Do you snore Yes   Do other people complain about your snoring Yes   Have you been told you stop breathing in your sleep Yes   Do you have issues with any of the following Morning mouth dryness   Sleep Disruptions - Movement   Do you get pain, discomfort, with an urge to  move Yes   Does it happen when you are resting Yes   Does it get better if you move around Yes   Does it happen more at night No   Have you been told you kick your legs at night No   Sleep Disruptions - Behaviours in Sleep   Do you ever experience sudden muscle weakness during the day No   Caffeine, Alcohol and Other Substances   How many caffeinated beverages (coffee, tea, soda, energy drinks) per day 2   What time of day is your last caffeine use 9 am   Do you drink alcohol to help you sleep No   Do you drink alcohol near bedtime Yes   Family History   Has any family member been diagnosed with a sleep disorder Yes   If yes, please indicate sleep apnea   In the last TWO WEEKS have you experienced any of the following symptoms?   Fevers No   Night Sweats No   Weight Gain No   Pain at Night Yes   Double Vision No   Changes in Vision No   Difficulty Breathing through Nose No   Sore Throat in Morning Yes   Dry Mouth in the Morning Yes   Shortness of Breath Lying Flat No   Shortness of Breath With Activity No   Awakening with Shortness of Breath No   Increased Cough No   Heart Racing at Night No   Swelling in Feet or Legs No   Diarrhea at Night No   Heartburn at Night No   Urinating More than Once at Night No   Losing Control of Urine at Night No   Joint Pains at Night Yes   Headaches in Morning Yes   Weakness in Arms or Legs No   Depressed Mood Yes   Anxiety No         2/10/2024     6:36 PM    [place] Sleepiness Scale ( ARLENE Flores  [id number]<br>ESS - [place]/English - Final version - 21 Nov 07 - [facility] [place].)   Sitting and reading Slight chance of dozing   Watching TV Moderate chance of dozing   Sitting, inactive in a public place (e.g. a theatre or a meeting) Slight chance of dozing   As a passenger in a car for an hour without a break Would never doze   Lying down to rest in the afternoon when circumstances permit Slight chance of dozing   Sitting and talking to someone Would never doze   Sitting quietly  after a lunch without alcohol Would never doze   In a car, while stopped for a few minutes in traffic Would never doze   [place] Score (MC) 5   [place] Score (Sleep) 5         2/10/2024     6:34 PM   Insomnia Severity Index (ZACARIAS)   Difficulty falling asleep 1   Difficulty staying asleep 3   Problems waking up too early 1   How SATISFIED/DISSATISFIED are you with your CURRENT sleep pattern? 3   How NOTICEABLE to others do you think your sleep problem is in terms of impairing the quality of your life? 2   How WORRIED/DISTRESSED are you about your current sleep problem? 2   To what extent do you consider your sleep problem to INTERFERE with your daily functioning (e.g. daytime fatigue, mood, ability to function at work/daily chores, concentration, memory, mood, etc.) CURRENTLY? 1   ZACARIAS Total Score 13         2/10/2024     6:35 PM   STOP BANG Questionnaire (  2008, the [employer] Society [employer] Anesthes[employer], [employer]   1. Snoring - Do you snore loudly (louder than talking or loud enough to be heard through closed doors)? Yes   2. Tired - Do you often feel tired, fatigued, or sleepy during daytime? Yes   3. Observed - Has anyone observed you stop breathing during your sleep? Yes   4. Blood pressure - Do you have or are you being treated for high blood pressure? Yes   5. BMI - BMI more than 35 kg/m2? Yes   6. Age - Age over 50 yr old? Yes   7. Neck circumference - Neck circumference greater than 40 cm? Yes   8. Gender - Gender male? Yes   STOP BANG Score (MC): 7 (High risk of RUBIN)

## 2024-02-15 NOTE — TELEPHONE ENCOUNTER
Chart review prior to sleep testing.    Patient Summary:  68 year old male who is referred for establishing care for obstructive sleep apnea and need for replacement equipment.    Patient Active Problem List    Diagnosis Date Noted    Psychophysiologic insomnia 10/16/2018     Priority: Medium    RUBIN (obstructive sleep apnea)- severe (AHI 36) 08/15/2018     Priority: Medium     PSG 7/16/2018 (190#)  AHI 36.1, events appearing primarily obstructive in nature.  Mean Spo2 91%, pernell SpO2 82%, 23.6% of recording was <= 89%.  No PLM's observed.  CPAP titrated to zenith of 11 cm H2O with both 9 cm H2O and 11 cm H2O appearing effective, with supine REM observed on 11 cm H2O      Arnold-Chiari malformation (H) 11/09/2017     Priority: Medium     Overview:   with cervical and thoracic syrinx      Degeneration of lumbar or lumbosacral intervertebral disc 11/09/2017     Priority: Medium    Controlled type 2 diabetes mellitus without complication, without long-term current use of insulin (H) 11/09/2017     Priority: Medium    Essential hypertension 11/09/2017     Priority: Medium    Pure hypercholesterolemia 11/09/2017     Priority: Medium    Obesity 11/09/2017     Priority: Medium     BMI - 34      Erectile dysfunction, unspecified erectile dysfunction type 10/12/2016     Priority: Medium    Microalbuminuria 03/27/2014     Priority: Medium    Depression, major 11/06/2013     Priority: Medium       Current Outpatient Medications   Medication    fluocinolone acetonide 0.01 % OIL    imiquimod (ALDARA) 5 % external cream    indomethacin (INDOCIN) 50 MG capsule    lisinopril (ZESTRIL) 10 MG tablet    lovastatin (MEVACOR) 20 MG tablet    metFORMIN (GLUCOPHAGE) 1000 MG tablet    order for DME    sertraline (ZOLOFT) 100 MG tablet    sildenafil (VIAGRA) 100 MG tablet     No current facility-administered medications for this visit.       Pertinent PMHx of Chiari malformation, obstructive sleep apnea, obesity, type 2 diabetes,  "hypertension, depression, psychophysiological insomnia.    Prior sleep testin2018 - PSG. AHI 36.1, events appearing primarily obstructive in nature. Mean Spo2 91%, pernell SpO2 82%, 23.6% of recording was <= 89%. EKG appeared NSR. No PLM's observed. CPAP titrated to zenith of 11 cm H2O with both 9 cm H2O and 11 cm H2O appearing effective, with supine REM observed on 11 cm H2O.     Last visit with sleep medicine with Dr. Godfrey on 10/16/2018.  Current CPAP settings of auto titrate 9-15 cm H2O.  Tolerating fairly well.  Noted for sleep onset insomnia, likely psychophysiological.    STOP-BANG score of 7, with unknown neck circumference.  [place] score of 5.  ZACARIAS: 13    BMI of Estimated body mass index is 31.48 kg/m  as calculated from the following:    Height as of 23: 1.68 m (5' 6.14\").    Weight as of an earlier encounter on 24: 88.9 kg (195 lb 14.4 oz).     Chief concern per questionnaire: \"Sleep apnea\"    Duration of symptoms:  \"\"    Goals for visit per questionnaire: \"Treatment\"    Sleep pattern:  Workdays.  10pm - 6:30am.  Weekends.  10pm - 6:30am.  Time to fall asleep: ~20 minutes.  Awakenings: ? times per night, varies minutes to return to sleep.  Average total sleep time:  7 hours  Napping.  0 days per week, - hours per nap.    No for RLS screen.  No for sleep walking.  No for dream enactment behavior.  No for bruxism.    No for morning headaches.  Yes for snoring.  Yes for observed apnea.  Yes for FHx of RUBIN.    Caffeine use:  No for 3+ per day.  No for within 6 hours of bed.    A/P:    1.)  History of severe obstructive sleep apnea.    Clinically, seems to be well-controlled with CPAP on his current settings.  The exact current settings to me are unknown.    Recommend clinic visit with up-to-date CPAP download.  If AHI less than 10, likely would not see need for repeat sleep testing and to proceed with replacement equipment.    ---  This note was written with the assistance of the Dragon " voice-dictation technology software. The final document, although reviewed, may contain errors. For corrections, please contact the office.    Baldemar Metz MD    Sleep Medicine  [place], [place]  Main Office: [phone]  [facility] [address], [address]  Schedule visits: [phone]  Main Office: [phone]  Fax: [phone]

## 2024-03-05 ENCOUNTER — LAB (OUTPATIENT)
Dept: LAB | Facility: OTHER | Age: 69
End: 2024-03-05
Payer: MEDICARE

## 2024-03-05 DIAGNOSIS — E11.9 CONTROLLED TYPE 2 DIABETES MELLITUS WITHOUT COMPLICATION, WITHOUT LONG-TERM CURRENT USE OF INSULIN (H): ICD-10-CM

## 2024-03-05 LAB
EST. AVERAGE GLUCOSE BLD GHB EST-MCNC: 169 MG/DL
HBA1C MFR BLD: 7.5 %

## 2024-03-05 PROCEDURE — 36415 COLL VENOUS BLD VENIPUNCTURE: CPT | Mod: ZL

## 2024-03-05 PROCEDURE — 83036 HEMOGLOBIN GLYCOSYLATED A1C: CPT | Mod: ZL

## 2024-03-11 ENCOUNTER — TELEPHONE (OUTPATIENT)
Dept: FAMILY MEDICINE | Facility: OTHER | Age: 69
End: 2024-03-11

## 2024-03-11 DIAGNOSIS — E78.5 ELEVATED LIPIDS: ICD-10-CM

## 2024-03-11 DIAGNOSIS — F41.9 ANXIETY: ICD-10-CM

## 2024-03-11 DIAGNOSIS — I10 ESSENTIAL HYPERTENSION: ICD-10-CM

## 2024-03-11 NOTE — TELEPHONE ENCOUNTER
----- Message from Devorah Krause RN sent at 3/11/2024  2:05 PM CDT -----  Regarding: Please call to schedule  For What: Return in about 4 months (around 6/5/2024) for Diabetes Follow up.    With: Sandra Robles       Thank you,  EMMA [place].   [facility] Quality Department

## 2024-03-11 NOTE — TELEPHONE ENCOUNTER
Disp Refills Start End KIMMIE   lisinopril (ZESTRIL) 10 MG tablet 90 tablet 3 3/6/2023 -- No      Disp Refills Start End KIMMIE   sertraline (ZOLOFT) 100 MG tablet 90 tablet 3 3/6/2023 -- No      Disp Refills Start End KIMMIE   lovastatin (MEVACOR) 20 MG tablet 90 tablet 3 3/6/2023 -- No     Last Office Visit: 02/05/2024  Future Office visit:       Routing refill request to provider for review/approval because:

## 2024-03-11 NOTE — TELEPHONE ENCOUNTER
Attempt # 1  Outcome: Left Message   Comment: LVM for pt to call to schedule with PCP in 4 months (around 6/5/24).

## 2024-03-12 DIAGNOSIS — E11.9 CONTROLLED TYPE 2 DIABETES MELLITUS WITHOUT COMPLICATION, WITHOUT LONG-TERM CURRENT USE OF INSULIN (H): ICD-10-CM

## 2024-03-12 RX ORDER — LISINOPRIL 10 MG/1
10 TABLET ORAL DAILY
Qty: 90 TABLET | Refills: 3 | Status: SHIPPED | OUTPATIENT
Start: 2024-03-12

## 2024-03-12 RX ORDER — SERTRALINE HYDROCHLORIDE 100 MG/1
TABLET, FILM COATED ORAL
Qty: 90 TABLET | Refills: 3 | Status: SHIPPED | OUTPATIENT
Start: 2024-03-12 | End: 2024-07-08

## 2024-03-12 RX ORDER — LOVASTATIN 20 MG
TABLET ORAL
Qty: 90 TABLET | Refills: 3 | Status: SHIPPED | OUTPATIENT
Start: 2024-03-12

## 2024-03-12 NOTE — TELEPHONE ENCOUNTER
Metformin (Glucophage) 1000 MG tablet    Last Written Prescription Date:  03/06/2023  Last Fill Quantity: 180,   # refills: 3  Last Office Visit: 02/05/2024

## 2024-03-25 ENCOUNTER — VIRTUAL VISIT (OUTPATIENT)
Dept: PULMONOLOGY | Facility: OTHER | Age: 69
End: 2024-03-25
Attending: FAMILY MEDICINE
Payer: MEDICARE

## 2024-03-25 DIAGNOSIS — G47.33 OSA (OBSTRUCTIVE SLEEP APNEA): Primary | Chronic | ICD-10-CM

## 2024-03-25 PROCEDURE — 99443 PR PHYSICIAN TELEPHONE EVALUATION 21-30 MIN: CPT | Performed by: FAMILY MEDICINE

## 2024-03-25 NOTE — PROGRESS NOTES
"Dirk is a 68 year old who is being evaluated via a billable telephone visit.    What phone number would you like to be contacted at? [phone]  How would you like to obtain your AVS? MyChart  Originating Location (pt. Location): Home    Distant Location (provider location):  Off-site    Phone call duration: 15 minutes  Signed Electronically by: Baldemar Metz MD, MD    Virtual visit for reestablishing care for obstructive sleep apnea managed with CPAP.     A/P:     1.)  History of severe obstructive sleep apnea.     Appears well-controlled with excellent compliance on CPAP auto titrate 9-15 cm H2O.    Orders placed for updated DME prescription.    Plan and recommend follow-up in 2 years per routine or sooner if questions or concerns.    SUBJECTIVE:  Dirk Terrell is a 68 year old male.    Pertinent PMHx of Chiari malformation, obstructive sleep apnea, obesity, type 2 diabetes, hypertension, depression, psychophysiological insomnia.     Prior sleep testin2018 - PSG. AHI 36.1, events appearing primarily obstructive in nature. Mean Spo2 91%, pernell SpO2 82%, 23.6% of recording was <= 89%. EKG appeared NSR. No PLM's observed. CPAP titrated to zenith of 11 cm H2O with both 9 cm H2O and 11 cm H2O appearing effective, with supine REM observed on 11 cm H2O.      Last visit with sleep medicine with Dr. Godfrey on 10/16/2018.  Current CPAP settings of auto titrate 9-15 cm H2O.  Tolerating fairly well.  Noted for sleep onset insomnia, likely psychophysiological.     STOP-BANG score of 7, with unknown neck circumference.  [place] score of 5.  ZACARIAS: 13     BMI of Estimated body mass index is 31.48 kg/m  as calculated from the following:    Height as of 23: 1.68 m (5' 6.14\").    Weight as of an earlier encounter on 24: 88.9 kg (195 lb 14.4 oz).      Today -he presents today primarily to get an updated CPAP supply prescription.  Otherwise he largely denies any sleep concerns at this time.     Chief concern " "per questionnaire: \"Sleep apnea\"     Duration of symptoms:  \"2010\"     Goals for visit per questionnaire: \"Treatment\"     Sleep pattern:  Workdays.  10pm - 6:30am.  Weekends.  10pm - 6:30am.  Time to fall asleep: ~20 minutes.  Awakenings: ? times per night, varies minutes to return to sleep.  Average total sleep time:  7 hours  Napping.  0 days per week, - hours per nap.     No for RLS screen.  No for sleep walking.  No for dream enactment behavior.  No for bruxism.     No for morning headaches.  Yes for snoring.  Yes for observed apnea.  Yes for FHx of RUBIN.     Caffeine use:  No for 3+ per day.  No for within 6 hours of bed.    CPAP download reviewed over the past 30 days on auto titrate 9-15 cm H2O.  Device is a [employer] air sense 10.  Used 30 of 30 days, average usage 7 hours 17 minutes.  AHI 1.1.      Past medical history:    Patient Active Problem List    Diagnosis Date Noted    Psychophysiologic insomnia 10/16/2018     Priority: Medium    RUBIN (obstructive sleep apnea)- severe (AHI 36) 08/15/2018     Priority: Medium     PSG 7/16/2018 (190#)  AHI 36.1, events appearing primarily obstructive in nature.  Mean Spo2 91%, pernell SpO2 82%, 23.6% of recording was <= 89%.  No PLM's observed.  CPAP titrated to zenith of 11 cm H2O with both 9 cm H2O and 11 cm H2O appearing effective, with supine REM observed on 11 cm H2O      Arnold-Chiari malformation (H) 11/09/2017     Priority: Medium     Overview:   with cervical and thoracic syrinx      Degeneration of lumbar or lumbosacral intervertebral disc 11/09/2017     Priority: Medium    Controlled type 2 diabetes mellitus without complication, without long-term current use of insulin (H) 11/09/2017     Priority: Medium    Essential hypertension 11/09/2017     Priority: Medium    Pure hypercholesterolemia 11/09/2017     Priority: Medium    Obesity 11/09/2017     Priority: Medium     BMI - 34      Erectile dysfunction, unspecified erectile dysfunction type 10/12/2016     " Priority: Medium    Microalbuminuria 03/27/2014     Priority: Medium    Depression, major 11/06/2013     Priority: Medium       10 point ROS of systems including Constitutional, Eyes, Respiratory, Cardiovascular, Gastroenterology, Genitourinary, Integumentary, Muscularskeletal, Psychiatric were all negative except for pertinent positives noted in my HPI.    Current Outpatient Medications   Medication Sig Dispense Refill    fluocinolone acetonide 0.01 % OIL 5 drops to right ear twice daily for 7-14 days (Patient not taking: Reported on 2/5/2024) 20 mL 0    imiquimod (ALDARA) 5 % external cream Apply a small sized amount to lesion on lip and face three times weekly at bedtime.   Wash off after 8 hours.   May use for up to 16 weeks. 12 packet 3    indomethacin (INDOCIN) 50 MG capsule Take 1 capsule (50 mg) by mouth 3 times daily as needed for moderate pain (4-6) 30 capsule 3    lisinopril (ZESTRIL) 10 MG tablet TAKE 1 TABLET (10 MG) BY MOUTH DAILY. 90 tablet 3    lovastatin (MEVACOR) 20 MG tablet TAKE 1 TABLET BY MOUTH EVERYDAY AT BEDTIME 90 tablet 3    metFORMIN (GLUCOPHAGE) 1000 MG tablet TAKE 1 TABLET BY MOUTH TWICE A DAY WITH MEALS 180 tablet 2    order for DME autoCPAP 9-15 cmh20 1 Device 0    sertraline (ZOLOFT) 100 MG tablet TAKE 1 TABLET BY MOUTH EVERY DAY 90 tablet 3    sildenafil (VIAGRA) 100 MG tablet Take 1 tablet (100 mg) by mouth daily as needed Take 30 minutes to 4 hours before intercourse 30 tablet 1       OBJECTIVE:  There were no vitals taken for this visit.    Physical Exam     ---  This note was written with the assistance of the Dragon voice-dictation technology software. The final document, although reviewed, may contain errors. For corrections, please contact the office.    Total time spent preparing to see the patient, review of chart, obtaining history and physical examination, review of sleep testing, review of treatment options, education, discussion with patient and documenting in [employer] / EMR  was 15 minutes.  All time involved was spent on the day of service for the patient (the same day as the patient's appointment).    Baldemar Metz MD    Sleep Medicine  [place], [place]  Main Office: [phone]  [facility] [address], [address]  Schedule visits: [phone]  Main Office: [phone]  Fax: [phone]

## 2024-06-10 ENCOUNTER — OFFICE VISIT (OUTPATIENT)
Dept: FAMILY MEDICINE | Facility: OTHER | Age: 69
End: 2024-06-10
Attending: STUDENT IN AN ORGANIZED HEALTH CARE EDUCATION/TRAINING PROGRAM
Payer: MEDICARE

## 2024-06-10 VITALS
OXYGEN SATURATION: 99 % | WEIGHT: 201.4 LBS | TEMPERATURE: 97.9 F | DIASTOLIC BLOOD PRESSURE: 70 MMHG | HEART RATE: 57 BPM | SYSTOLIC BLOOD PRESSURE: 120 MMHG | BODY MASS INDEX: 32.37 KG/M2 | RESPIRATION RATE: 17 BRPM | HEIGHT: 66 IN

## 2024-06-10 DIAGNOSIS — F33.2 SEVERE EPISODE OF RECURRENT MAJOR DEPRESSIVE DISORDER, WITHOUT PSYCHOTIC FEATURES (H): ICD-10-CM

## 2024-06-10 DIAGNOSIS — E11.9 CONTROLLED TYPE 2 DIABETES MELLITUS WITHOUT COMPLICATION, WITHOUT LONG-TERM CURRENT USE OF INSULIN (H): Primary | ICD-10-CM

## 2024-06-10 DIAGNOSIS — R53.82 CHRONIC FATIGUE: ICD-10-CM

## 2024-06-10 DIAGNOSIS — E55.9 VITAMIN D DEFICIENCY, UNSPECIFIED: ICD-10-CM

## 2024-06-10 LAB
EST. AVERAGE GLUCOSE BLD GHB EST-MCNC: 157 MG/DL
HBA1C MFR BLD: 7.1 %
TSH SERPL DL<=0.005 MIU/L-ACNC: 3.91 UIU/ML (ref 0.3–4.2)
VIT B12 SERPL-MCNC: 326 PG/ML (ref 232–1245)
VIT D+METAB SERPL-MCNC: 14 NG/ML (ref 20–50)

## 2024-06-10 PROCEDURE — 84443 ASSAY THYROID STIM HORMONE: CPT | Mod: ZL | Performed by: STUDENT IN AN ORGANIZED HEALTH CARE EDUCATION/TRAINING PROGRAM

## 2024-06-10 PROCEDURE — G0463 HOSPITAL OUTPT CLINIC VISIT: HCPCS

## 2024-06-10 PROCEDURE — 99214 OFFICE O/P EST MOD 30 MIN: CPT | Performed by: STUDENT IN AN ORGANIZED HEALTH CARE EDUCATION/TRAINING PROGRAM

## 2024-06-10 PROCEDURE — 83036 HEMOGLOBIN GLYCOSYLATED A1C: CPT | Mod: ZL | Performed by: STUDENT IN AN ORGANIZED HEALTH CARE EDUCATION/TRAINING PROGRAM

## 2024-06-10 PROCEDURE — 82306 VITAMIN D 25 HYDROXY: CPT | Mod: ZL | Performed by: STUDENT IN AN ORGANIZED HEALTH CARE EDUCATION/TRAINING PROGRAM

## 2024-06-10 PROCEDURE — 36415 COLL VENOUS BLD VENIPUNCTURE: CPT | Mod: ZL | Performed by: STUDENT IN AN ORGANIZED HEALTH CARE EDUCATION/TRAINING PROGRAM

## 2024-06-10 PROCEDURE — 82607 VITAMIN B-12: CPT | Mod: ZL | Performed by: STUDENT IN AN ORGANIZED HEALTH CARE EDUCATION/TRAINING PROGRAM

## 2024-06-10 RX ORDER — ESCITALOPRAM OXALATE 20 MG/1
20 TABLET ORAL DAILY
Qty: 30 TABLET | Refills: 1 | Status: SHIPPED | OUTPATIENT
Start: 2024-06-10 | End: 2024-07-09

## 2024-06-10 ASSESSMENT — ANXIETY QUESTIONNAIRES
GAD7 TOTAL SCORE: 7
3. WORRYING TOO MUCH ABOUT DIFFERENT THINGS: SEVERAL DAYS
4. TROUBLE RELAXING: SEVERAL DAYS
IF YOU CHECKED OFF ANY PROBLEMS ON THIS QUESTIONNAIRE, HOW DIFFICULT HAVE THESE PROBLEMS MADE IT FOR YOU TO DO YOUR WORK, TAKE CARE OF THINGS AT HOME, OR GET ALONG WITH OTHER PEOPLE: VERY DIFFICULT
GAD7 TOTAL SCORE: 7
2. NOT BEING ABLE TO STOP OR CONTROL WORRYING: SEVERAL DAYS
7. FEELING AFRAID AS IF SOMETHING AWFUL MIGHT HAPPEN: SEVERAL DAYS
1. FEELING NERVOUS, ANXIOUS, OR ON EDGE: SEVERAL DAYS
7. FEELING AFRAID AS IF SOMETHING AWFUL MIGHT HAPPEN: SEVERAL DAYS
GAD7 TOTAL SCORE: 7
8. IF YOU CHECKED OFF ANY PROBLEMS, HOW DIFFICULT HAVE THESE MADE IT FOR YOU TO DO YOUR WORK, TAKE CARE OF THINGS AT HOME, OR GET ALONG WITH OTHER PEOPLE?: VERY DIFFICULT
6. BECOMING EASILY ANNOYED OR IRRITABLE: SEVERAL DAYS
5. BEING SO RESTLESS THAT IT IS HARD TO SIT STILL: SEVERAL DAYS

## 2024-06-10 ASSESSMENT — PATIENT HEALTH QUESTIONNAIRE - PHQ9
10. IF YOU CHECKED OFF ANY PROBLEMS, HOW DIFFICULT HAVE THESE PROBLEMS MADE IT FOR YOU TO DO YOUR WORK, TAKE CARE OF THINGS AT HOME, OR GET ALONG WITH OTHER PEOPLE: SOMEWHAT DIFFICULT
SUM OF ALL RESPONSES TO PHQ QUESTIONS 1-9: 19
SUM OF ALL RESPONSES TO PHQ QUESTIONS 1-9: 19

## 2024-06-10 ASSESSMENT — PAIN SCALES - GENERAL: PAINLEVEL: MILD PAIN (3)

## 2024-06-10 NOTE — PROGRESS NOTES
Assessment & Plan     Controlled type 2 diabetes mellitus without complication, without long-term current use of insulin (H)  He tells me his diet has not been so good lately  HA1c trend: 7.7>5.8>7.4>7.5  On Metformin 1000 mg BID  Fasting BG at home <140  - Hemoglobin A1c; Future    Chronic fatigue  Check blood work to evaluate medical cause of this  - TSH with free T4 reflex; Future  - Vitamin B12; Future  - Vitamin D Deficiency; Future    Severe episode of recurrent major depressive disorder, without psychotic features (H)  Long standing history of depression.  Severe anhedonia with passive SI, no plan and no intent of completing.  No history of suicide attempts. No AVH.    On Zoloft 100 mg for a long time, 10+ years.  Has tried weaning off but did not tolerate.  Would recommend direct switch to Lexapro 10 mg   He is going to [place] today with his brother for a few days to fish.  Previously loved to fish and this will be his first time going this year.  He will switch selective serotonin reuptake inhibitor when he returns from [place].  Discussed possible side effects.  Follow-up in 4 weeks  Strongly recommend concurrent therapy.  Card for Abdoulaye Patterson given today and referral for counseling placed  - escitalopram (LEXAPRO) 20 MG tablet; Take 1 tablet (20 mg) by mouth daily  - Adult Mental Health  Referral; Future    Vitamin D deficiency, unspecified  Check vitamin D due to depression  - Vitamin D Deficiency; Future        Return in about 4 weeks (around 7/8/2024).    Julian Cavazos is a 68 year old, presenting for the following health issues:  Diabetes and RECHECK        6/10/2024     8:22 AM   Additional Questions   Roomed by Sheri Haynes LPN, CCP, MHP   Accompanied by self     History of Present Illness       Diabetes:   He presents for follow up of diabetes.  He is checking home blood glucose a few times a month.   He checks blood glucose before meals.  Blood glucose is never over 200 and  never under 70.  When his blood glucose is low, the patient is asymptomatic for confusion, blurred vision, lethargy and reports not feeling dizzy, shaky, or weak.   He has no concerns regarding his diabetes at this time.   He is not experiencing numbness or burning in feet, excessive thirst, blurry vision, weight changes or redness, sores or blisters on feet.           He eats 0-1 servings of fruits and vegetables daily.He consumes 0 sweetened beverage(s) daily.He exercises with enough effort to increase his heart rate 20 to 29 minutes per day.  He exercises with enough effort to increase his heart rate 4 days per week. He is missing 1 dose(s) of medications per week.  He is not taking prescribed medications regularly due to remembering to take.       He tells me he has   Has no interest in anything anymore  Used to go fishing a lot and doesn't   Will be going to [place] to fish with brother today.   Has never hurt self before  Lack of interest that bothers him.    No history of stroke or heart attack  Lately nothing has been making him happy.    Has been on zoloft for at least 10 years.    Doesn't feel like it's doing anything for him anymore.  Definitely not happy enough on it.    Drinking alcohol- varies a lot.  Sometimes none and sometimes too much.  Bernie is drink of choice- sometimes half of small bottle.  375 mL  Has been thinking about suicide for the past 6 months but no intention to do it.    Diabetes Follow-up    How often are you checking your blood sugar? A few times a month  What time of day are you checking your blood sugars (select all that apply)?  Before meals  Have you had any blood sugars above 200?  No  Have you had any blood sugars below 70?  No  What symptoms do you notice when your blood sugar is low?  None  What concerns do you have today about your diabetes? None   Do you have any of these symptoms? (Select all that apply)  No numbness or tingling in feet.  No redness, sores or blisters on  "feet.  No complaints of excessive thirst.  No reports of blurry vision.  No significant changes to weight.      BP Readings from Last 2 Encounters:   06/10/24 120/70   02/05/24 126/68     Hemoglobin A1C (%)   Date Value   03/05/2024 7.5 (H)   12/04/2023 7.4 (H)   06/01/2021 6.7 (H)   09/12/2019 6.7 (H)     LDL Cholesterol Calculated (mg/dL)   Date Value   12/04/2023 73   06/07/2022 65   06/01/2021 78   09/12/2019 87     Depression Screening Follow-up        6/10/2024     8:25 AM   PHQ   PHQ-9 Total Score 19   Q9: Thoughts of better off dead/self-harm past 2 weeks Several days   F/U: Thoughts of suicide or self-harm Yes   F/U: Self harm-plan No   F/U: Self-harm action No   F/U: Safety concerns No         Review of Systems  Constitutional, HEENT, cardiovascular, pulmonary, GI, , musculoskeletal, neuro, skin, endocrine and psych systems are negative, except as otherwise noted.      Objective    /70 (BP Location: Right arm, Patient Position: Sitting, Cuff Size: Adult Large)   Pulse 57   Temp 97.9  F (36.6  C) (Tympanic)   Resp 17   Ht 1.676 m (5' 6\")   Wt 91.4 kg (201 lb 6.4 oz)   SpO2 99%   BMI 32.51 kg/m    Body mass index is 32.51 kg/m .  Physical Exam   GENERAL: alert and no distress  EYES: Eyes grossly normal to inspection, PERRL and conjunctivae and sclerae normal  HENT: ear canals and TM's normal, nose and mouth without ulcers or lesions  NECK: no adenopathy, no asymmetry, masses, or scars  RESP: lungs clear to auscultation - no rales, rhonchi or wheezes  CV: regular rate and rhythm, normal S1 S2, no S3 or S4, no murmur, click or rub, no peripheral edema  ABDOMEN: soft, nontender, no hepatosplenomegaly, no masses and bowel sounds normal  MS: no gross musculoskeletal defects noted, no edema  SKIN: no suspicious lesions or rashes  NEURO: Normal strength and tone, mentation intact and speech normal  PSYCH: mentation appears normal, affect normal/bright            Signed Electronically by: Sandra BARR" MD Margaret

## 2024-06-14 ENCOUNTER — TELEPHONE (OUTPATIENT)
Dept: FAMILY MEDICINE | Facility: OTHER | Age: 69
End: 2024-06-14

## 2024-06-14 DIAGNOSIS — E55.9 VITAMIN D DEFICIENCY, UNSPECIFIED: Primary | ICD-10-CM

## 2024-06-14 RX ORDER — ERGOCALCIFEROL 1.25 MG/1
50000 CAPSULE, LIQUID FILLED ORAL WEEKLY
Qty: 12 CAPSULE | Refills: 0 | Status: SHIPPED | OUTPATIENT
Start: 2024-06-14 | End: 2024-08-31

## 2024-06-14 NOTE — TELEPHONE ENCOUNTER
Results requested: test results     Best number to reach patient at: [phone]     Best time to call patient: anytime this am preferrs    Send to care team in basket.

## 2024-06-14 NOTE — TELEPHONE ENCOUNTER
Sandra Robles MD  6/11/2024  8:15 AM CDT       Vitamin D very low.  Recommend high dose regimen x 12 weeks.  Please pend 50,000 international unit(s) for 12 weeks and repeat vitamin D level in 12 weeks too

## 2024-07-07 DIAGNOSIS — F33.2 SEVERE EPISODE OF RECURRENT MAJOR DEPRESSIVE DISORDER, WITHOUT PSYCHOTIC FEATURES (H): ICD-10-CM

## 2024-07-07 ASSESSMENT — ANXIETY QUESTIONNAIRES
IF YOU CHECKED OFF ANY PROBLEMS ON THIS QUESTIONNAIRE, HOW DIFFICULT HAVE THESE PROBLEMS MADE IT FOR YOU TO DO YOUR WORK, TAKE CARE OF THINGS AT HOME, OR GET ALONG WITH OTHER PEOPLE: SOMEWHAT DIFFICULT
8. IF YOU CHECKED OFF ANY PROBLEMS, HOW DIFFICULT HAVE THESE MADE IT FOR YOU TO DO YOUR WORK, TAKE CARE OF THINGS AT HOME, OR GET ALONG WITH OTHER PEOPLE?: SOMEWHAT DIFFICULT
1. FEELING NERVOUS, ANXIOUS, OR ON EDGE: SEVERAL DAYS
5. BEING SO RESTLESS THAT IT IS HARD TO SIT STILL: SEVERAL DAYS
GAD7 TOTAL SCORE: 5
4. TROUBLE RELAXING: NOT AT ALL
2. NOT BEING ABLE TO STOP OR CONTROL WORRYING: SEVERAL DAYS
7. FEELING AFRAID AS IF SOMETHING AWFUL MIGHT HAPPEN: NOT AT ALL
GAD7 TOTAL SCORE: 5
6. BECOMING EASILY ANNOYED OR IRRITABLE: SEVERAL DAYS
7. FEELING AFRAID AS IF SOMETHING AWFUL MIGHT HAPPEN: NOT AT ALL
GAD7 TOTAL SCORE: 5
3. WORRYING TOO MUCH ABOUT DIFFERENT THINGS: SEVERAL DAYS

## 2024-07-07 ASSESSMENT — PATIENT HEALTH QUESTIONNAIRE - PHQ9
10. IF YOU CHECKED OFF ANY PROBLEMS, HOW DIFFICULT HAVE THESE PROBLEMS MADE IT FOR YOU TO DO YOUR WORK, TAKE CARE OF THINGS AT HOME, OR GET ALONG WITH OTHER PEOPLE: SOMEWHAT DIFFICULT
SUM OF ALL RESPONSES TO PHQ QUESTIONS 1-9: 6
SUM OF ALL RESPONSES TO PHQ QUESTIONS 1-9: 6

## 2024-07-08 ENCOUNTER — OFFICE VISIT (OUTPATIENT)
Dept: FAMILY MEDICINE | Facility: OTHER | Age: 69
End: 2024-07-08
Attending: STUDENT IN AN ORGANIZED HEALTH CARE EDUCATION/TRAINING PROGRAM
Payer: MEDICARE

## 2024-07-08 VITALS
RESPIRATION RATE: 16 BRPM | DIASTOLIC BLOOD PRESSURE: 62 MMHG | TEMPERATURE: 97.4 F | HEART RATE: 59 BPM | HEIGHT: 66 IN | OXYGEN SATURATION: 97 % | WEIGHT: 202 LBS | SYSTOLIC BLOOD PRESSURE: 112 MMHG | BODY MASS INDEX: 32.47 KG/M2

## 2024-07-08 DIAGNOSIS — L98.9 SKIN LESION: Primary | ICD-10-CM

## 2024-07-08 DIAGNOSIS — I10 ESSENTIAL HYPERTENSION: ICD-10-CM

## 2024-07-08 DIAGNOSIS — E11.9 TYPE 2 DIABETES MELLITUS WITHOUT COMPLICATION, WITHOUT LONG-TERM CURRENT USE OF INSULIN (H): ICD-10-CM

## 2024-07-08 DIAGNOSIS — L98.9 SKIN LESION: ICD-10-CM

## 2024-07-08 DIAGNOSIS — F33.2 SEVERE EPISODE OF RECURRENT MAJOR DEPRESSIVE DISORDER, WITHOUT PSYCHOTIC FEATURES (H): ICD-10-CM

## 2024-07-08 PROCEDURE — 99214 OFFICE O/P EST MOD 30 MIN: CPT | Performed by: STUDENT IN AN ORGANIZED HEALTH CARE EDUCATION/TRAINING PROGRAM

## 2024-07-08 PROCEDURE — G0463 HOSPITAL OUTPT CLINIC VISIT: HCPCS | Performed by: STUDENT IN AN ORGANIZED HEALTH CARE EDUCATION/TRAINING PROGRAM

## 2024-07-08 RX ORDER — MUPIROCIN CALCIUM 20 MG/G
CREAM TOPICAL 3 TIMES DAILY
Qty: 15 G | Refills: 0 | Status: SHIPPED | OUTPATIENT
Start: 2024-07-08 | End: 2024-07-11

## 2024-07-08 ASSESSMENT — PAIN SCALES - GENERAL: PAINLEVEL: MODERATE PAIN (5)

## 2024-07-08 NOTE — PROGRESS NOTES
Assessment & Plan     Skin lesion  Worsening skin lesion on right lower cheek/angle of the mandible.  Crusted lesion with some white/yellow exudate on erythematous base about 2 cm in diameter.   Looks significantly larger than before.  We had been treated for presumed AK (actinic keratosis).  The enlarging lesion appears to be an adverse reaction to the cream.  I told him to stop this cream and start applying topical antibiotic.  Will send for some mupirocin.    Urgent referral to dermatology placed  No evidence of cellulitis currently.    - Adult Dermatology  Referral; Future  - mupirocin (BACTROBAN) 2 % external cream; Apply topically 3 times daily Apply to lesion on face 3x daily.    Severe episode of recurrent major depressive disorder, without psychotic features (H)  Patient had made a direct switch from sertraline to lexapro 20 mg.  He reports doing better on this than the sertraline.  Reports general improvement in mood and denies any adverse effects.  We will continue on this medication.  Recommend concurrent therapy    Type 2 diabetes mellitus without complication, without long-term current use of insulin  Stable  Most recent HA1c of 7.1 which is acceptable for age.  Goal A1c is 7.0 or better  Continue present management with lifestyle and dietary changes  Continue metformin 1000 mg BID    Essential HTN  Well controlled.    Continue lisinopril 10 mg daily    No follow-ups on file.    Julian Cavazos is a 68 year old, presenting for the following health issues:  Diabetes, Hypertension, and Depression        7/8/2024     8:22 AM   Additional Questions   Roomed by April   Accompanied by self         7/8/2024     8:22 AM   Patient Reported Additional Medications   Patient reports taking the following new medications none     History of Present Illness       Mental Health Follow-up:  Patient presents to follow-up on Depression.Patient's depression since last visit has been:  Medium  The patient is  not having other symptoms associated with depression.      Any significant life events: No  Patient is not feeling anxious or having panic attacks.  Patient has concerns about alcohol or drug use.    He eats 0-1 servings of fruits and vegetables daily.He consumes 0 sweetened beverage(s) daily.He exercises with enough effort to increase his heart rate 10 to 19 minutes per day.  He exercises with enough effort to increase his heart rate 4 days per week.   He is taking medications regularly.       Sertraline to lexapro 20 mg  Did a BBQ at the [facility] place for the 4th of July  Mood seems to be better on the lexapro.  He denies any side effects from the medication    Diabetes Follow-up    How often are you checking your blood sugar? A few times a month  What time of day are you checking your blood sugars (select all that apply)?   No certain time  Have you had any blood sugars above 200?  No  Have you had any blood sugars below 70?  No  What symptoms do you notice when your blood sugar is low?  None  What concerns do you have today about your diabetes? None   Do you have any of these symptoms? (Select all that apply)  No numbness or tingling in feet.  No redness, sores or blisters on feet.  No complaints of excessive thirst.  No reports of blurry vision.  No significant changes to weight.      BP Readings from Last 2 Encounters:   07/08/24 112/62   06/10/24 120/70     Hemoglobin A1C (%)   Date Value   06/10/2024 7.1 (H)   03/05/2024 7.5 (H)   06/01/2021 6.7 (H)   09/12/2019 6.7 (H)     LDL Cholesterol Calculated (mg/dL)   Date Value   12/04/2023 73   06/07/2022 65   06/01/2021 78   09/12/2019 87           Hypertension Follow-up    Do you check your blood pressure regularly outside of the clinic? No   Are you following a low salt diet? Yes  Are your blood pressures ever more than 140 on the top number (systolic) OR more   than 90 on the bottom number (diastolic), for example 140/90? No    Depression   How are you doing  with your depression since your last visit? Improved   Are you having other symptoms that might be associated with depression? No  Have you had a significant life event?  No   Are you feeling anxious or having panic attacks?   No  Do you have any concerns with your use of alcohol or other drugs? No    Social History     Tobacco Use    Smoking status: Former     Current packs/day: 0.00     Average packs/day: 1.5 packs/day for 10.0 years (15.0 ttl pk-yrs)     Types: Cigarettes     Start date: 1978     Quit date: 1988     Years since quittin.9    Smokeless tobacco: Never   Vaping Use    Vaping status: Never Used   Substance Use Topics    Alcohol use: Yes     Comment: 12/week    Drug use: No         2024     8:00 AM 6/10/2024     8:25 AM 2024     8:45 AM   PHQ   PHQ-9 Total Score 5 19 6   Q9: Thoughts of better off dead/self-harm past 2 weeks Not at all Several days Not at all   F/U: Thoughts of suicide or self-harm  Yes    F/U: Self harm-plan  No    F/U: Self-harm action  No    F/U: Safety concerns  No          2023     2:20 PM 6/10/2024     8:27 AM 2024     8:45 AM   MARTY-7 SCORE   Total Score 1 (minimal anxiety) 7 (mild anxiety) 5 (mild anxiety)   Total Score 1 7 5         2024     8:45 AM   Last PHQ-9   1.  Little interest or pleasure in doing things 1   2.  Feeling down, depressed, or hopeless 1   3.  Trouble falling or staying asleep, or sleeping too much 0   4.  Feeling tired or having little energy 2   5.  Poor appetite or overeating 1   6.  Feeling bad about yourself 0   7.  Trouble concentrating 1   8.  Moving slowly or restless 0   Q9: Thoughts of better off dead/self-harm past 2 weeks 0   PHQ-9 Total Score 6         2024     8:45 AM   MARTY-7    1. Feeling nervous, anxious, or on edge 1   2. Not being able to stop or control worrying 1   3. Worrying too much about different things 1   4. Trouble relaxing 0   5. Being so restless that it is hard to sit still 1   6. Becoming  "easily annoyed or irritable 1   7. Feeling afraid, as if something awful might happen 0   MARTY-7 Total Score 5   If you checked any problems, how difficult have they made it for you to do your work, take care of things at home, or get along with other people? Somewhat difficult       Suicide Assessment Five-step Evaluation and Treatment (SAFE-T)        Review of Systems  Constitutional, HEENT, cardiovascular, pulmonary, GI, , musculoskeletal, neuro, skin, endocrine and psych systems are negative, except as otherwise noted.      Objective    /62 (BP Location: Right arm, Patient Position: Sitting, Cuff Size: Adult Regular)   Pulse 59   Temp 97.4  F (36.3  C) (Tympanic)   Resp 16   Ht 1.676 m (5' 6\")   Wt 91.6 kg (202 lb)   SpO2 97%   BMI 32.60 kg/m    Body mass index is 32.6 kg/m .  Physical Exam   GENERAL: alert and no distress  EYES: Eyes grossly normal to inspection, PERRL and conjunctivae and sclerae normal  HENT: ear canals and TM's normal, nose and mouth without ulcers or lesions  NECK: no adenopathy, no asymmetry, masses, or scars  RESP: lungs clear to auscultation - no rales, rhonchi or wheezes  CV: regular rate and rhythm, normal S1 S2, no S3 or S4, no murmur, click or rub, no peripheral edema  ABDOMEN: soft, nontender, no hepatosplenomegaly, no masses and bowel sounds normal  MS: no gross musculoskeletal defects noted, no edema  SKIN: no suspicious lesions or rashes  NEURO: Normal strength and tone, mentation intact and speech normal  PSYCH: mentation appears normal, affect normal/bright            Signed Electronically by: Sandra Robles MD    "

## 2024-07-09 RX ORDER — ESCITALOPRAM OXALATE 20 MG/1
20 TABLET ORAL DAILY
Qty: 90 TABLET | Refills: 1 | Status: SHIPPED | OUTPATIENT
Start: 2024-07-09 | End: 2024-09-27

## 2024-07-09 NOTE — TELEPHONE ENCOUNTER
escitalopram (LEXAPRO) 20 MG tablet       Last Written Prescription Date:  6/10/24  Last Fill Quantity: 30,   # refills: 1  Last Office Visit: 7/7/24  Future Office visit:       Routing refill request to provider for review/approval because:    SSRIs Protocol [id number]/07/2024 11:31 AM   Protocol Details PHQ-9 score less than 5 in past 6 months

## 2024-07-11 RX ORDER — MUPIROCIN 20 MG/G
OINTMENT TOPICAL
Qty: 15 G | Refills: 0 | Status: SHIPPED | OUTPATIENT
Start: 2024-07-11

## 2024-09-25 DIAGNOSIS — F33.2 SEVERE EPISODE OF RECURRENT MAJOR DEPRESSIVE DISORDER, WITHOUT PSYCHOTIC FEATURES (H): ICD-10-CM

## 2024-09-25 NOTE — TELEPHONE ENCOUNTER
Lexapro      Last Written Prescription Date:  7/9/24  Last Fill Quantity: 90,   # refills: 1  Last Office Visit: 7/8/24  Future Office visit:       Routing refill request to provider for review/approval because:

## 2024-09-27 RX ORDER — ESCITALOPRAM OXALATE 20 MG/1
20 TABLET ORAL DAILY
Qty: 90 TABLET | Refills: 0 | Status: SHIPPED | OUTPATIENT
Start: 2024-09-27

## 2024-11-03 ENCOUNTER — MYC REFILL (OUTPATIENT)
Dept: FAMILY MEDICINE | Facility: OTHER | Age: 69
End: 2024-11-03

## 2024-11-03 DIAGNOSIS — M25.50 MULTIPLE JOINT PAIN: ICD-10-CM

## 2024-11-05 NOTE — TELEPHONE ENCOUNTER
indomethacin (INDOCIN) 50 MG capsule 30 capsule 3 9/24/2022     Last Office Visit: 7/8/24     Future Office visit:       Routing refill request to provider for review/approval because:

## 2024-11-06 RX ORDER — INDOMETHACIN 50 MG/1
50 CAPSULE ORAL 3 TIMES DAILY PRN
Qty: 30 CAPSULE | Refills: 3 | OUTPATIENT
Start: 2024-11-06

## 2024-11-06 NOTE — TELEPHONE ENCOUNTER
Patient comment: Don't need this often but am currently out.           Gout Agents Protocol [id number]/03/2024 05:31 PM   Protocol Details CBC on file in past 12 months    Has Uric Acid on file in past 12 months and value is less than 6    Medication indicated for associated diagnosis

## 2024-11-06 NOTE — TELEPHONE ENCOUNTER
David Ramirez, Can we have him schedule to come in some time in the next week for a visit first?  I want to be sure his labs look okay

## 2024-11-23 ENCOUNTER — APPOINTMENT (OUTPATIENT)
Dept: CT IMAGING | Facility: HOSPITAL | Age: 69
End: 2024-11-23
Attending: STUDENT IN AN ORGANIZED HEALTH CARE EDUCATION/TRAINING PROGRAM

## 2024-11-23 ENCOUNTER — HOSPITAL ENCOUNTER (OUTPATIENT)
Facility: HOSPITAL | Age: 69
Setting detail: OBSERVATION
Discharge: HOME | End: 2024-11-24
Attending: STUDENT IN AN ORGANIZED HEALTH CARE EDUCATION/TRAINING PROGRAM | Admitting: SURGERY
Payer: MEDICARE

## 2024-11-23 DIAGNOSIS — R10.9 ABDOMINAL PAIN, UNSPECIFIED ABDOMINAL LOCATION: ICD-10-CM

## 2024-11-23 DIAGNOSIS — K92.1 HEMATOCHEZIA: Primary | ICD-10-CM

## 2024-11-23 DIAGNOSIS — K52.9 ENTERITIS: ICD-10-CM

## 2024-11-23 LAB
ALBUMIN SERPL BCG-MCNC: 4.6 G/DL (ref 3.5–5.2)
ALP SERPL-CCNC: 59 U/L (ref 40–150)
ALT SERPL W P-5'-P-CCNC: 20 U/L (ref 0–70)
ANION GAP SERPL CALCULATED.3IONS-SCNC: 12 MMOL/L (ref 7–15)
AST SERPL W P-5'-P-CCNC: 29 U/L (ref 0–45)
BASOPHILS # BLD AUTO: 0 10E3/UL (ref 0–0.2)
BASOPHILS NFR BLD AUTO: 0 %
BILIRUB SERPL-MCNC: 1.1 MG/DL
BUN SERPL-MCNC: 29.6 MG/DL (ref 8–23)
CALCIUM SERPL-MCNC: 10 MG/DL (ref 8.8–10.4)
CHLORIDE SERPL-SCNC: 95 MMOL/L (ref 98–107)
CREAT SERPL-MCNC: 1.22 MG/DL (ref 0.67–1.17)
CRP SERPL-MCNC: 22.14 MG/L
EGFRCR SERPLBLD CKD-EPI 2021: 65 ML/MIN/1.73M2
EOSINOPHIL # BLD AUTO: 0.1 10E3/UL (ref 0–0.7)
EOSINOPHIL NFR BLD AUTO: 1 %
ERYTHROCYTE [DISTWIDTH] IN BLOOD BY AUTOMATED COUNT: 12.3 % (ref 10–15)
GLUCOSE BLDC GLUCOMTR-MCNC: 166 MG/DL (ref 70–99)
GLUCOSE BLDC GLUCOMTR-MCNC: 223 MG/DL (ref 70–99)
GLUCOSE SERPL-MCNC: 269 MG/DL (ref 70–99)
HCO3 SERPL-SCNC: 26 MMOL/L (ref 22–29)
HCT VFR BLD AUTO: 43.1 % (ref 40–53)
HEMOCCULT SP1 STL QL: NEGATIVE
HEMOCCULT SP1 STL QL: POSITIVE
HGB BLD-MCNC: 15 G/DL (ref 13.3–17.7)
HOLD SPECIMEN: NORMAL
IMM GRANULOCYTES # BLD: 0 10E3/UL
IMM GRANULOCYTES NFR BLD: 1 %
LACTATE SERPL-SCNC: 1.7 MMOL/L (ref 0.7–2)
LIPASE SERPL-CCNC: 18 U/L (ref 13–60)
LYMPHOCYTES # BLD AUTO: 1.1 10E3/UL (ref 0.8–5.3)
LYMPHOCYTES NFR BLD AUTO: 15 %
MCH RBC QN AUTO: 32.5 PG (ref 26.5–33)
MCHC RBC AUTO-ENTMCNC: 34.8 G/DL (ref 31.5–36.5)
MCV RBC AUTO: 93 FL (ref 78–100)
MONOCYTES # BLD AUTO: 0.7 10E3/UL (ref 0–1.3)
MONOCYTES NFR BLD AUTO: 10 %
NEUTROPHILS # BLD AUTO: 5.5 10E3/UL (ref 1.6–8.3)
NEUTROPHILS NFR BLD AUTO: 73 %
NRBC # BLD AUTO: 0 10E3/UL
NRBC BLD AUTO-RTO: 0 /100
PLATELET # BLD AUTO: 158 10E3/UL (ref 150–450)
POTASSIUM SERPL-SCNC: 4.4 MMOL/L (ref 3.4–5.3)
PROT SERPL-MCNC: 7.3 G/DL (ref 6.4–8.3)
RBC # BLD AUTO: 4.62 10E6/UL (ref 4.4–5.9)
SARS-COV-2 RNA RESP QL NAA+PROBE: NEGATIVE
SODIUM SERPL-SCNC: 133 MMOL/L (ref 135–145)
WBC # BLD AUTO: 7.5 10E3/UL (ref 4–11)

## 2024-11-23 PROCEDURE — 36415 COLL VENOUS BLD VENIPUNCTURE: CPT | Performed by: STUDENT IN AN ORGANIZED HEALTH CARE EDUCATION/TRAINING PROGRAM

## 2024-11-23 PROCEDURE — 99285 EMERGENCY DEPT VISIT HI MDM: CPT | Mod: 25

## 2024-11-23 PROCEDURE — 86140 C-REACTIVE PROTEIN: CPT | Performed by: SURGERY

## 2024-11-23 PROCEDURE — 82040 ASSAY OF SERUM ALBUMIN: CPT | Performed by: STUDENT IN AN ORGANIZED HEALTH CARE EDUCATION/TRAINING PROGRAM

## 2024-11-23 PROCEDURE — 74177 CT ABD & PELVIS W/CONTRAST: CPT | Mod: MG

## 2024-11-23 PROCEDURE — 82274 ASSAY TEST FOR BLOOD FECAL: CPT | Performed by: STUDENT IN AN ORGANIZED HEALTH CARE EDUCATION/TRAINING PROGRAM

## 2024-11-23 PROCEDURE — 87507 IADNA-DNA/RNA PROBE TQ 12-25: CPT | Performed by: SURGERY

## 2024-11-23 PROCEDURE — 99285 EMERGENCY DEPT VISIT HI MDM: CPT | Performed by: STUDENT IN AN ORGANIZED HEALTH CARE EDUCATION/TRAINING PROGRAM

## 2024-11-23 PROCEDURE — 85025 COMPLETE CBC W/AUTO DIFF WBC: CPT | Performed by: STUDENT IN AN ORGANIZED HEALTH CARE EDUCATION/TRAINING PROGRAM

## 2024-11-23 PROCEDURE — 99222 1ST HOSP IP/OBS MODERATE 55: CPT | Performed by: HOSPITALIST

## 2024-11-23 PROCEDURE — 258N000003 HC RX IP 258 OP 636: Performed by: SURGERY

## 2024-11-23 PROCEDURE — 120N000001 HC R&B MED SURG/OB

## 2024-11-23 PROCEDURE — 83690 ASSAY OF LIPASE: CPT | Performed by: STUDENT IN AN ORGANIZED HEALTH CARE EDUCATION/TRAINING PROGRAM

## 2024-11-23 PROCEDURE — 87635 SARS-COV-2 COVID-19 AMP PRB: CPT | Performed by: SURGERY

## 2024-11-23 PROCEDURE — 80053 COMPREHEN METABOLIC PANEL: CPT | Performed by: STUDENT IN AN ORGANIZED HEALTH CARE EDUCATION/TRAINING PROGRAM

## 2024-11-23 PROCEDURE — 99222 1ST HOSP IP/OBS MODERATE 55: CPT | Performed by: SURGERY

## 2024-11-23 PROCEDURE — 250N000011 HC RX IP 250 OP 636: Performed by: STUDENT IN AN ORGANIZED HEALTH CARE EDUCATION/TRAINING PROGRAM

## 2024-11-23 PROCEDURE — 82274 ASSAY TEST FOR BLOOD FECAL: CPT | Performed by: SURGERY

## 2024-11-23 PROCEDURE — 83605 ASSAY OF LACTIC ACID: CPT | Performed by: STUDENT IN AN ORGANIZED HEALTH CARE EDUCATION/TRAINING PROGRAM

## 2024-11-23 PROCEDURE — 250N000013 HC RX MED GY IP 250 OP 250 PS 637: Performed by: SURGERY

## 2024-11-23 RX ORDER — AMOXICILLIN 250 MG
1 CAPSULE ORAL 2 TIMES DAILY PRN
Status: DISCONTINUED | OUTPATIENT
Start: 2024-11-23 | End: 2024-11-24

## 2024-11-23 RX ORDER — LIDOCAINE 40 MG/G
CREAM TOPICAL
Status: DISCONTINUED | OUTPATIENT
Start: 2024-11-23 | End: 2024-11-24 | Stop reason: HOSPADM

## 2024-11-23 RX ORDER — HYDROMORPHONE HYDROCHLORIDE 1 MG/ML
0.5 INJECTION, SOLUTION INTRAMUSCULAR; INTRAVENOUS; SUBCUTANEOUS EVERY 30 MIN PRN
Status: DISCONTINUED | OUTPATIENT
Start: 2024-11-23 | End: 2024-11-24 | Stop reason: HOSPADM

## 2024-11-23 RX ORDER — NALOXONE HYDROCHLORIDE 0.4 MG/ML
0.2 INJECTION, SOLUTION INTRAMUSCULAR; INTRAVENOUS; SUBCUTANEOUS
Status: DISCONTINUED | OUTPATIENT
Start: 2024-11-23 | End: 2024-11-24 | Stop reason: HOSPADM

## 2024-11-23 RX ORDER — NALOXONE HYDROCHLORIDE 0.4 MG/ML
0.4 INJECTION, SOLUTION INTRAMUSCULAR; INTRAVENOUS; SUBCUTANEOUS
Status: DISCONTINUED | OUTPATIENT
Start: 2024-11-23 | End: 2024-11-24 | Stop reason: HOSPADM

## 2024-11-23 RX ORDER — LISINOPRIL 10 MG/1
10 TABLET ORAL DAILY
Status: DISCONTINUED | OUTPATIENT
Start: 2024-11-24 | End: 2024-11-24 | Stop reason: HOSPADM

## 2024-11-23 RX ORDER — POLYETHYLENE GLYCOL 3350 17 G/17G
238 POWDER, FOR SOLUTION ORAL ONCE
Status: DISCONTINUED | OUTPATIENT
Start: 2024-11-23 | End: 2024-11-23

## 2024-11-23 RX ORDER — INDOMETHACIN 25 MG/1
50 CAPSULE ORAL 3 TIMES DAILY PRN
Status: DISCONTINUED | OUTPATIENT
Start: 2024-11-23 | End: 2024-11-24 | Stop reason: HOSPADM

## 2024-11-23 RX ORDER — NICOTINE POLACRILEX 4 MG
15-30 LOZENGE BUCCAL
Status: DISCONTINUED | OUTPATIENT
Start: 2024-11-23 | End: 2024-11-24 | Stop reason: HOSPADM

## 2024-11-23 RX ORDER — BISACODYL 5 MG
10 TABLET, DELAYED RELEASE (ENTERIC COATED) ORAL DAILY PRN
Status: DISCONTINUED | OUTPATIENT
Start: 2024-11-23 | End: 2024-11-24

## 2024-11-23 RX ORDER — BISACODYL 5 MG
5 TABLET, DELAYED RELEASE (ENTERIC COATED) ORAL DAILY PRN
Status: DISCONTINUED | OUTPATIENT
Start: 2024-11-23 | End: 2024-11-23

## 2024-11-23 RX ORDER — BISACODYL 5 MG/1
5 TABLET, DELAYED RELEASE ORAL SEE ADMIN INSTRUCTIONS
Qty: 4 TABLET | Refills: 0 | Status: SHIPPED | OUTPATIENT
Start: 2024-11-23

## 2024-11-23 RX ORDER — CALCIUM CARBONATE 500 MG/1
1000 TABLET, CHEWABLE ORAL 4 TIMES DAILY PRN
Status: DISCONTINUED | OUTPATIENT
Start: 2024-11-23 | End: 2024-11-24 | Stop reason: HOSPADM

## 2024-11-23 RX ORDER — IOPAMIDOL 755 MG/ML
99 INJECTION, SOLUTION INTRAVASCULAR ONCE
Status: COMPLETED | OUTPATIENT
Start: 2024-11-23 | End: 2024-11-23

## 2024-11-23 RX ORDER — DEXTROSE MONOHYDRATE 25 G/50ML
25-50 INJECTION, SOLUTION INTRAVENOUS
Status: DISCONTINUED | OUTPATIENT
Start: 2024-11-23 | End: 2024-11-24 | Stop reason: HOSPADM

## 2024-11-23 RX ORDER — ESCITALOPRAM OXALATE 10 MG/1
20 TABLET ORAL DAILY
Status: DISCONTINUED | OUTPATIENT
Start: 2024-11-24 | End: 2024-11-24 | Stop reason: HOSPADM

## 2024-11-23 RX ORDER — SODIUM CHLORIDE, SODIUM LACTATE, POTASSIUM CHLORIDE, CALCIUM CHLORIDE 600; 310; 30; 20 MG/100ML; MG/100ML; MG/100ML; MG/100ML
INJECTION, SOLUTION INTRAVENOUS CONTINUOUS
Status: DISCONTINUED | OUTPATIENT
Start: 2024-11-23 | End: 2024-11-24

## 2024-11-23 RX ORDER — AMOXICILLIN 250 MG
2 CAPSULE ORAL 2 TIMES DAILY PRN
Status: DISCONTINUED | OUTPATIENT
Start: 2024-11-23 | End: 2024-11-24 | Stop reason: HOSPADM

## 2024-11-23 RX ORDER — PRAVASTATIN SODIUM 20 MG
20 TABLET ORAL DAILY
Status: DISCONTINUED | OUTPATIENT
Start: 2024-11-24 | End: 2024-11-24 | Stop reason: HOSPADM

## 2024-11-23 RX ORDER — ONDANSETRON 2 MG/ML
4 INJECTION INTRAMUSCULAR; INTRAVENOUS EVERY 30 MIN PRN
Status: DISCONTINUED | OUTPATIENT
Start: 2024-11-23 | End: 2024-11-24 | Stop reason: HOSPADM

## 2024-11-23 RX ADMIN — IOPAMIDOL 99 ML: 755 INJECTION, SOLUTION INTRAVENOUS at 13:23

## 2024-11-23 RX ADMIN — BISACODYL 10 MG: 5 TABLET, COATED ORAL at 18:06

## 2024-11-23 RX ADMIN — SODIUM CHLORIDE, POTASSIUM CHLORIDE, SODIUM LACTATE AND CALCIUM CHLORIDE: 600; 310; 30; 20 INJECTION, SOLUTION INTRAVENOUS at 17:52

## 2024-11-23 RX ADMIN — POLYETHYLENE GLYCOL 3350, SODIUM SULFATE ANHYDROUS, SODIUM BICARBONATE, SODIUM CHLORIDE, POTASSIUM CHLORIDE 4000 ML: 236; 22.74; 6.74; 5.86; 2.97 POWDER, FOR SOLUTION ORAL at 17:58

## 2024-11-23 ASSESSMENT — ACTIVITIES OF DAILY LIVING (ADL)
ADLS_ACUITY_SCORE: 0

## 2024-11-23 ASSESSMENT — COLUMBIA-SUICIDE SEVERITY RATING SCALE - C-SSRS
6. HAVE YOU EVER DONE ANYTHING, STARTED TO DO ANYTHING, OR PREPARED TO DO ANYTHING TO END YOUR LIFE?: NO
2. HAVE YOU ACTUALLY HAD ANY THOUGHTS OF KILLING YOURSELF IN THE PAST MONTH?: NO
1. IN THE PAST MONTH, HAVE YOU WISHED YOU WERE DEAD OR WISHED YOU COULD GO TO SLEEP AND NOT WAKE UP?: NO

## 2024-11-23 NOTE — CONSULTS
"[facility]  Consult Note - Hospitalist Service  Date of Admission:  11/23/2024  Consult Requested by: Dr Carias   Reason for Consult: medical management     Assessment & Plan    Dirk Terrell is a 69 year old male admitted on 11/23/2024. He    Principal Problem:  Enteritis  Abdominal pain, unspecified abdominal location  CRP elevation  FOB positive  CT abd pelvis done in er showing acute enteritis affecting distal ilium   Surgery plans on EGD and colonoscopy tomorrow    ENMA  Suspect pre rental  IV fluids    Active Problems:    Arnold-Chiari malformation (H)  Noted    Pseudohyponatremia  Na 133 Glucose 269  Monitor sodium      Depression, major   Home lexapro      Controlled type 2 diabetes mellitus without complication, without long-term current use of insulin (H)   Iss  Monitor poct glucose      Essential hypertension   Home lisinopril      Pure hypercholesterolemia  Home statin      Obesity  Diet and exercise      RUBIN (obstructive sleep apnea)- severe (AHI 36)    Ok for home cpap at hs              Clinically Significant Risk Factors Present on Admission         # Hyponatremia: Lowest Na = 133 mmol/L in last 2 days, will monitor as appropriate  # Hypochloremia: Lowest Cl = 95 mmol/L in last 2 days, will monitor as appropriate          # Hypertension: Noted on problem list          # DMII: A1C = N/A within past 6 months    # Obesity: Estimated body mass index is 32.86 kg/m  as calculated from the following:    Height as of this encounter: 1.676 m (5' 6\").    Weight as of this encounter: 92.4 kg (203 lb 9.5 oz).              Lukasz Craig DO  Hospitalist Service  Securely message with [employer] (more info)  Text page via [employer] Paging/Directory   ______________________________________________________________________    Chief Complaint   Abd pain    History is obtained from the patient, electronic health record, and emergency department physician    History of Present Illness   Dirk Terrell is a " 69 year old male who has established diagnosis of a depression, Arnold-Chiari malformation, type 2 diabetes without long-term use of insulin, essential hypertension, pure hypercholesterolemia, obesity, obstructive sleep apnea.  Patient presented emergency room with complaint of having abdominal pain, he did have extensive workup including CBC showing white blood cell count of 7.5, sodium 133, creatinine 1.22, glucose 269, lipase 18,  CRP 22, lactic acid 1.7, fecal occult positive for blood in the stool.  Patient did have CT abdomen pelvis done showing acute enteritis of the distal ileum.  General surgery did see the patient plan to admit to the hospital for EGD and colonoscopy tomorrow.  Hospital medicine was consulted for medical management.    Past Medical History    Past Medical History:   Diagnosis Date    Cancer (H)     skin cancer on lip    Depressive disorder     Diabetes (H) 2017    Gout     history of gout     Hypertension 2017       Past Surgical History   Past Surgical History:   Procedure Laterality Date    BIOPSY      COLONOSCOPY N/A 2016    one polyp was told to repeat in 5 years    CYSTOSCOPY,+URETEROSCOPY      stent    GENITOURINARY SURGERY      ORTHOPEDIC SURGERY      Left rotator cuff surgery    ORTHOPEDIC SURGERY      left rotator cuff surgery    TONSILLECTOMY  1963       Medications   I have reviewed this patient's current medications       Review of Systems    The 10 point Review of Systems is negative other than noted in the HPI or here.      Social History   I have reviewed this patient's social history and updated it with pertinent information if needed.  Social History     Tobacco Use    Smoking status: Former     Current packs/day: 0.00     Average packs/day: 1.5 packs/day for 10.0 years (15.0 ttl pk-yrs)     Types: Cigarettes     Start date: 1978     Quit date: 1988     Years since quittin.3    Smokeless tobacco: Never   Vaping Use    Vaping  status: Never Used   Substance Use Topics    Alcohol use: Yes     Comment: 12/week    Drug use: No         Family History   I have reviewed this patient's family history and updated it with pertinent information if needed.  Family History   Problem Relation Age of Onset    Diabetes Brother          Allergies   No Known Allergies     Physical Exam   Vital Signs: Temp: 100  F (37.8  C) Temp src: Tympanic BP: 131/74 Pulse: 69   Resp: 20 SpO2: 95 % O2 Device: None (Room air)    Weight: 203 lbs 9.52 oz    Physical Exam  HENT:      Right Ear: External ear normal.      Left Ear: External ear normal.      Nose: Nose normal.      Mouth/Throat:      Pharynx: Oropharynx is clear.   Eyes:      Conjunctiva/sclera: Conjunctivae normal.   Cardiovascular:      Rate and Rhythm: Normal rate.   Pulmonary:      Effort: Pulmonary effort is normal.   Abdominal:      General: Abdomen is flat.      Tenderness: There is abdominal tenderness.   Musculoskeletal:         General: No swelling.   Skin:     Coloration: Skin is not jaundiced.   Neurological:      Mental Status: He is alert. Mental status is at baseline.         Medical Decision Making       64 MINUTES SPENT BY ME on the date of service doing chart review, history, exam, documentation & further activities per the note.      Data     I have personally reviewed the following data over the past 24 hrs:    7.5  \   15.0   / 158     133 (L) 95 (L) 29.6 (H) /  269 (H)   4.4 26 1.22 (H) \     ALT: 20 AST: 29 AP: 59 TBILI: 1.1   ALB: 4.6 TOT PROTEIN: 7.3 LIPASE: 18     Procal: N/A CRP: 22.14 (H) Lactic Acid: 1.7         Imaging results reviewed over the past 24 hrs:   No results found for this or any previous visit (from the past 24 hours).

## 2024-11-23 NOTE — ED TRIAGE NOTES
Patient here for abdominal pain for 5 days. States it feels like a balloon. States he has tried to take gas ex but that did not helped. States he has had diarrhea with this and this morning it was dark and coffee color. States he does drink 3-4 drinks daily but not in the last few days.      Triage Assessment (Adult)       Row Name 11/23/24 1216          Triage Assessment    Airway WDL WDL        Respiratory WDL    Respiratory WDL WDL        Skin Circulation/Temperature WDL    Skin Circulation/Temperature WDL WDL        Cardiac WDL    Cardiac WDL WDL        Peripheral/Neurovascular WDL    Peripheral Neurovascular WDL WDL        Cognitive/Neuro/Behavioral WDL    Cognitive/Neuro/Behavioral WDL WDL

## 2024-11-23 NOTE — CONSULTS
"ER NOTE - CONSULT  11/23/2024    Patient:Dirk Terrell    Reason for Referral: Abdominal pain.    This is a 69 year old male with a history of type 2 diabetes and essential hypertension.  He has obstructive sleep apnea as well.  Patient states that he started developing abdominal pressure and fullness about 5 days ago.  He really has not been able to eat or drink much.  He has been working through yogurt and toast.  In addition, he will be able to drink a little bit of water.  He for many years, has had loose stools and has had to take psyllium in order to thicken his bowels.  He was also having diarrhea earlier this week.  Just last night, he started passing blood in the stool.  This is the first time this has happened.  He did have a colonoscopy 8 years ago which he had 1 polyp and was told to repeat in 5 years.  He presented to the emergency department with abdominal bloating and it feels like \"a balloon\" is inside of me.  Laboratory was ordered, CT scan was ordered and physical exam was performed.  Because of these findings, the surgical service was consulted.    Upon my arrival, the patient was working on his phone and he was in no apparent distress.  He is talkative and alert and oriented x 4.  He complains of bloating and gassiness.  He did not have a bowel movement this morning but yesterday he had bloody diarrhea.  He denies any nausea or vomiting.  He did have a half a piece of toast this morning that really did not sit well.  He denies any foreign foods, raw foods, anyone else in the family being sick, eating wild game.  He denies any significant pain or point tenderness.    Past Medical History:  Past Medical History:   Diagnosis Date    Cancer (H)     skin cancer on lip    Depressive disorder     Diabetes (H) 11/9/2017    Gout 2014    history of gout     Hypertension 11/9/2017       Past Surgical History:  Past Surgical History:   Procedure Laterality Date    BIOPSY      COLONOSCOPY N/A 11/01/2016    " one polyp was told to repeat in 5 years    CYSTOSCOPY,+URETEROSCOPY  2002    stent    GENITOURINARY SURGERY      ORTHOPEDIC SURGERY  2013    Left rotator cuff surgery    ORTHOPEDIC SURGERY  2011    left rotator cuff surgery    TONSILLECTOMY  1963       Family History History:  Family History   Problem Relation Age of Onset    Diabetes Brother        History of Tobacco Use:  History   Smoking Status    Former    Packs/day: 1.50    Years: 10.00    Types: Cigarettes    Quit date: 8/8/1988   Smokeless Tobacco    Never       Current Medications:  Current Outpatient Medications   Medication Sig Dispense Refill    escitalopram (LEXAPRO) 20 MG tablet TAKE 1 TABLET BY MOUTH EVERY DAY 90 tablet 0    imiquimod (ALDARA) 5 % external cream Apply a small sized amount to lesion on lip and face three times weekly at bedtime.   Wash off after 8 hours.   May use for up to 16 weeks. 12 packet 3    indomethacin (INDOCIN) 50 MG capsule Take 1 capsule (50 mg) by mouth 3 times daily as needed for moderate pain (4-6) 30 capsule 3    lisinopril (ZESTRIL) 10 MG tablet TAKE 1 TABLET (10 MG) BY MOUTH DAILY. 90 tablet 3    lovastatin (MEVACOR) 20 MG tablet TAKE 1 TABLET BY MOUTH EVERYDAY AT BEDTIME 90 tablet 3    metFORMIN (GLUCOPHAGE) 1000 MG tablet TAKE 1 TABLET BY MOUTH TWICE A DAY WITH MEALS 180 tablet 2    mupirocin (BACTROBAN) 2 % external ointment Apply topically 3 times daily Apply to lesion on face 3x daily. - Topical 15 g 0    order for DME autoCPAP 9-15 cmh20 1 Device 0       Allergies:  No Known Allergies    ROS:  Constitutional: positive for malaise and anorexia  Respiratory: negative for cough, pleurisy/chest pain, pneumonia, or asthma.  He does have obstructive sleep apnea and uses a CPAP.  Cardiovascular: negative for chest pain, chest pressure/discomfort, dyspnea, palpitations  Gastrointestinal: As per HPI    PHYSICAL EXAM:     Vital signs: /64   Pulse 67   Temp 97.9  F (36.6  C) (Tympanic)   Resp 16   Ht 1.676 m (5'  "6\")   Wt 92.4 kg (203 lb 9.5 oz)   SpO2 96%   BMI 32.86 kg/m     Weight: [unfilled]   BMI: Body mass index is 32.86 kg/m .   General: cooperative, overweight   Skin: no rashes, no ecchymoses, no petechiae   Lungs: clear to auscultation, without wheezes, without crackles   CV: Regular rate and rhythm without murmer   Abdominal: no rebound tenderness, no guarding or rigidity, no CVA tenderness, no herniae noted, no masses noted.  Patient does have a distended abdomen with quiet bowel sounds.  His abdomen is tense but not rigid.  There is no hepato or splenomegaly.   Extremities: No cyanosis, clubbing or edema noted bilaterally in Upper and Lower Extremities       Lab:  Please see EMR    Radiology:  Please see EMR.  I do not see any suspicion for hernia, bowel obstruction, free air.  He has mild to moderate amount of fluid in the abdomen.  The majority of the small bowel is edematous.  There is thickening of portions of the terminal ileum.  The colon does not appear to be massively distended.  Does appear the patient has multiple small, radiopaque foreign bodies in the colon, I did ask the patient about birdshot and metal pellets from wild game however, patient denies.    ASSESSMENT: Gastroenteritis, etiology unknown   Hematochezia   Type 2 diabetes   Essential hypertension   Obstructive sleep apnea, requiring CPAP    PLAN: Will admit the patient to the hospital under surgical service.  Will continue IV fluids and the patient because of his inability to tolerate diet and liquids.  He will have a bowel prep today.  In addition, we will collect stool for stool cultures.  Tomorrow morning, we will plan for both upper and lower endoscopy for further diagnosis.  I have discussed both upper and lower endoscopy the patient, I did discuss with the plan under modified anesthesia care.  Tomorrow morning, I will discussed the potential risks and complications again with the patient for both upper and lower endoscopies.  " 2

## 2024-11-23 NOTE — ED PROVIDER NOTES
"[facility]  ED Provider Note    Chief Complaint   Patient presents with    Abdominal Pain    Diarrhea    Nausea     History:  Dirk Terrell is a 69 year old male with RUBIN and obesity with history of chronic diarrhea as well presents to the emergency department today complaining of abdominal pain.  He states he has had some amount of mild abdominal pain with eating for quite a long time but things have significantly worsened over the last 5 days.  Now after he eats he has significant pain and the pain has been fairly constant for the last 5 days but it is definitely worse for about an hour after eating.  Some nausea no vomiting.  He states he always has diarrhea and that using bulk fiber he has improvement in his diarrhea symptoms at baseline but he stopped using it recently and is diarrhea have been worse.  He also notes that he 2 or 3 days ago he started using Pepto-Bismol and his stools have been black since.  The pain radiates from his epigastrium down into the suprapubic area.  No other complain.    Review of Systems   Performed; see HPI for pertinent positives and negatives.     Medical history, surgical history, and social history was reviewed.  Nursing documentation, triage note, and vitals were reviewed.    Vitals:  BP: 145/85  Pulse: 74  Temp: 97.9  F (36.6  C)  Resp: 16  Height: 167.6 cm (5' 6\")  Weight: 92.4 kg (203 lb 9.5 oz)  SpO2: 98 %    Physical Exam:  Constitutional: Alert and conversant. NAD   HENT: NCAT   Eyes: Normal pupils   Neck: supple   CV: No pallor  Pulmonary/Chest: Non-labored respirations  Abdominal: Firm and protuberant abdomen diffusely tender worse in the lower than the upper with a visible ventral hernia as he tries to sit up  MSK: TOVAR.   Neuro: Alert and appropriate   Skin: Warm and dry. No diaphoresis. No rashes on exposed skin    Psych: Appropriate mood and affect       MDM:      ED Course as of 11/23/24 1554   Sat Nov 23, 2024   1429 Abnormal small bowel, wall " thickening, long segment. Colon OK. Functioning small bowel obstruction.    1551 Differential includes but is not limited to appendicitis, cholecystitis, pancreatitis, nephrolithiasis, gastroesophageal reflux disease, gastritis, pyelonephritis, urinary tract infection, testicular torsion, mesenteric ischemia, strangulated hernia, aortic aneurysm.      1552 Lactic Acid: 1.7  Less likely mesenteric ischemia but the fact that he gets pain every time he eats is potentially concerning.   1552 Based on the CT results with shows significant small bowel edema with fat stranding, and numerous pools of free fluid on my independent read as well as radiology read, I do not think it would be safe to go home at this time.  Discussed the case with the surgeon who is coming evaluated the patient.  He recommends admission.  Will proceed at this time with plan for admission.       Procedures:  Procedures        Impression:  Final diagnoses:   Abdominal pain, unspecified abdominal location   Enteritis            Tristin Garcia MD  11/23/24 1553

## 2024-11-24 ENCOUNTER — ANESTHESIA (OUTPATIENT)
Dept: SURGERY | Facility: HOSPITAL | Age: 69
End: 2024-11-24

## 2024-11-24 ENCOUNTER — ANESTHESIA EVENT (OUTPATIENT)
Dept: SURGERY | Facility: HOSPITAL | Age: 69
End: 2024-11-24

## 2024-11-24 VITALS
WEIGHT: 200.62 LBS | HEIGHT: 66 IN | BODY MASS INDEX: 32.24 KG/M2 | OXYGEN SATURATION: 95 % | RESPIRATION RATE: 16 BRPM | DIASTOLIC BLOOD PRESSURE: 58 MMHG | SYSTOLIC BLOOD PRESSURE: 121 MMHG | HEART RATE: 65 BPM | TEMPERATURE: 98.5 F

## 2024-11-24 PROBLEM — K92.1 HEMATOCHEZIA: Status: ACTIVE | Noted: 2024-11-24

## 2024-11-24 LAB
ADV 40+41 DNA STL QL NAA+NON-PROBE: NEGATIVE
ALBUMIN SERPL BCG-MCNC: 3.8 G/DL (ref 3.5–5.2)
ALP SERPL-CCNC: 48 U/L (ref 40–150)
ALT SERPL W P-5'-P-CCNC: 20 U/L (ref 0–70)
ANION GAP SERPL CALCULATED.3IONS-SCNC: 11 MMOL/L (ref 7–15)
AST SERPL W P-5'-P-CCNC: 37 U/L (ref 0–45)
ASTRO TYP 1-8 RNA STL QL NAA+NON-PROBE: NEGATIVE
BILIRUB SERPL-MCNC: 0.8 MG/DL
BUN SERPL-MCNC: 22.7 MG/DL (ref 8–23)
C CAYETANENSIS DNA STL QL NAA+NON-PROBE: NEGATIVE
CALCIUM SERPL-MCNC: 9.2 MG/DL (ref 8.8–10.4)
CAMPYLOBACTER DNA SPEC NAA+PROBE: NEGATIVE
CHLORIDE SERPL-SCNC: 98 MMOL/L (ref 98–107)
CREAT SERPL-MCNC: 1.06 MG/DL (ref 0.67–1.17)
CRP SERPL-MCNC: 14.24 MG/L
CRYPTOSP DNA STL QL NAA+NON-PROBE: NEGATIVE
E COLI O157 DNA STL QL NAA+NON-PROBE: NORMAL
E HISTOLYT DNA STL QL NAA+NON-PROBE: NEGATIVE
EAEC ASTA GENE ISLT QL NAA+PROBE: NEGATIVE
EC STX1+STX2 GENES STL QL NAA+NON-PROBE: NEGATIVE
EGFRCR SERPLBLD CKD-EPI 2021: 76 ML/MIN/1.73M2
EPEC EAE GENE STL QL NAA+NON-PROBE: NEGATIVE
ERYTHROCYTE [DISTWIDTH] IN BLOOD BY AUTOMATED COUNT: 12.3 % (ref 10–15)
ETEC LTA+ST1A+ST1B TOX ST NAA+NON-PROBE: NEGATIVE
G LAMBLIA DNA STL QL NAA+NON-PROBE: NEGATIVE
GLUCOSE BLDC GLUCOMTR-MCNC: 190 MG/DL (ref 70–99)
GLUCOSE BLDC GLUCOMTR-MCNC: 199 MG/DL (ref 70–99)
GLUCOSE BLDC GLUCOMTR-MCNC: 199 MG/DL (ref 70–99)
GLUCOSE SERPL-MCNC: 185 MG/DL (ref 70–99)
HCO3 SERPL-SCNC: 24 MMOL/L (ref 22–29)
HCT VFR BLD AUTO: 35.7 % (ref 40–53)
HGB BLD-MCNC: 12.6 G/DL (ref 13.3–17.7)
MCH RBC QN AUTO: 32.4 PG (ref 26.5–33)
MCHC RBC AUTO-ENTMCNC: 35.3 G/DL (ref 31.5–36.5)
MCV RBC AUTO: 92 FL (ref 78–100)
NOROVIRUS GI+II RNA STL QL NAA+NON-PROBE: NEGATIVE
P SHIGELLOIDES DNA STL QL NAA+NON-PROBE: NEGATIVE
PLATELET # BLD AUTO: 111 10E3/UL (ref 150–450)
POTASSIUM SERPL-SCNC: 4.3 MMOL/L (ref 3.4–5.3)
PROT SERPL-MCNC: 5.9 G/DL (ref 6.4–8.3)
RBC # BLD AUTO: 3.89 10E6/UL (ref 4.4–5.9)
RVA RNA STL QL NAA+NON-PROBE: NEGATIVE
SALMONELLA SP RPOD STL QL NAA+PROBE: NEGATIVE
SAPO I+II+IV+V RNA STL QL NAA+NON-PROBE: NEGATIVE
SHIGELLA SP+EIEC IPAH ST NAA+NON-PROBE: NEGATIVE
SODIUM SERPL-SCNC: 133 MMOL/L (ref 135–145)
V CHOLERAE DNA SPEC QL NAA+PROBE: NEGATIVE
VIBRIO DNA SPEC NAA+PROBE: NEGATIVE
WBC # BLD AUTO: 4.3 10E3/UL (ref 4–11)
Y ENTEROCOL DNA STL QL NAA+PROBE: NEGATIVE

## 2024-11-24 PROCEDURE — 80053 COMPREHEN METABOLIC PANEL: CPT | Performed by: SURGERY

## 2024-11-24 PROCEDURE — 45380 COLONOSCOPY AND BIOPSY: CPT | Performed by: SURGERY

## 2024-11-24 PROCEDURE — 36415 COLL VENOUS BLD VENIPUNCTURE: CPT | Performed by: SURGERY

## 2024-11-24 PROCEDURE — 250N000013 HC RX MED GY IP 250 OP 250 PS 637: Performed by: SURGERY

## 2024-11-24 PROCEDURE — 272N000001 HC OR GENERAL SUPPLY STERILE: Performed by: SURGERY

## 2024-11-24 PROCEDURE — 710N000010 HC RECOVERY PHASE 1, LEVEL 2, PER MIN: Performed by: SURGERY

## 2024-11-24 PROCEDURE — 85027 COMPLETE CBC AUTOMATED: CPT | Performed by: SURGERY

## 2024-11-24 PROCEDURE — 250N000009 HC RX 250: Performed by: NURSE ANESTHETIST, CERTIFIED REGISTERED

## 2024-11-24 PROCEDURE — 250N000009 HC RX 250: Performed by: SURGERY

## 2024-11-24 PROCEDURE — 88305 TISSUE EXAM BY PATHOLOGIST: CPT | Mod: 26 | Performed by: PATHOLOGY

## 2024-11-24 PROCEDURE — 370N000017 HC ANESTHESIA TECHNICAL FEE, PER MIN: Performed by: SURGERY

## 2024-11-24 PROCEDURE — 43239 EGD BIOPSY SINGLE/MULTIPLE: CPT | Performed by: SURGERY

## 2024-11-24 PROCEDURE — 86140 C-REACTIVE PROTEIN: CPT | Performed by: SURGERY

## 2024-11-24 PROCEDURE — 82374 ASSAY BLOOD CARBON DIOXIDE: CPT | Performed by: SURGERY

## 2024-11-24 PROCEDURE — 82247 BILIRUBIN TOTAL: CPT | Performed by: SURGERY

## 2024-11-24 PROCEDURE — 80051 ELECTROLYTE PANEL: CPT | Performed by: SURGERY

## 2024-11-24 PROCEDURE — 258N000003 HC RX IP 258 OP 636: Performed by: SURGERY

## 2024-11-24 PROCEDURE — 250N000012 HC RX MED GY IP 250 OP 636 PS 637: Performed by: HOSPITALIST

## 2024-11-24 PROCEDURE — 88305 TISSUE EXAM BY PATHOLOGIST: CPT | Mod: TC | Performed by: SURGERY

## 2024-11-24 PROCEDURE — 360N000075 HC SURGERY LEVEL 2, PER MIN: Performed by: SURGERY

## 2024-11-24 PROCEDURE — 250N000012 HC RX MED GY IP 250 OP 636 PS 637: Performed by: SURGERY

## 2024-11-24 PROCEDURE — 99232 SBSQ HOSP IP/OBS MODERATE 35: CPT | Performed by: HOSPITALIST

## 2024-11-24 PROCEDURE — 250N000011 HC RX IP 250 OP 636: Performed by: NURSE ANESTHETIST, CERTIFIED REGISTERED

## 2024-11-24 RX ORDER — SODIUM CHLORIDE, SODIUM LACTATE, POTASSIUM CHLORIDE, CALCIUM CHLORIDE 600; 310; 30; 20 MG/100ML; MG/100ML; MG/100ML; MG/100ML
INJECTION, SOLUTION INTRAVENOUS CONTINUOUS
Status: DISCONTINUED | OUTPATIENT
Start: 2024-11-24 | End: 2024-11-24 | Stop reason: HOSPADM

## 2024-11-24 RX ORDER — ONDANSETRON 2 MG/ML
4 INJECTION INTRAMUSCULAR; INTRAVENOUS EVERY 30 MIN PRN
Status: DISCONTINUED | OUTPATIENT
Start: 2024-11-24 | End: 2024-11-24 | Stop reason: HOSPADM

## 2024-11-24 RX ORDER — NALOXONE HYDROCHLORIDE 0.4 MG/ML
0.1 INJECTION, SOLUTION INTRAMUSCULAR; INTRAVENOUS; SUBCUTANEOUS
Status: DISCONTINUED | OUTPATIENT
Start: 2024-11-24 | End: 2024-11-24 | Stop reason: HOSPADM

## 2024-11-24 RX ORDER — DEXAMETHASONE SODIUM PHOSPHATE 10 MG/ML
4 INJECTION, SOLUTION INTRAMUSCULAR; INTRAVENOUS
Status: DISCONTINUED | OUTPATIENT
Start: 2024-11-24 | End: 2024-11-24 | Stop reason: HOSPADM

## 2024-11-24 RX ORDER — ZINC OXIDE 20 %
OINTMENT (GRAM) TOPICAL 2 TIMES DAILY
Status: DISCONTINUED | OUTPATIENT
Start: 2024-11-24 | End: 2024-11-24 | Stop reason: HOSPADM

## 2024-11-24 RX ORDER — LIDOCAINE HYDROCHLORIDE 20 MG/ML
INJECTION, SOLUTION INFILTRATION; PERINEURAL PRN
Status: DISCONTINUED | OUTPATIENT
Start: 2024-11-24 | End: 2024-11-24

## 2024-11-24 RX ORDER — ONDANSETRON 4 MG/1
4 TABLET, ORALLY DISINTEGRATING ORAL EVERY 30 MIN PRN
Status: DISCONTINUED | OUTPATIENT
Start: 2024-11-24 | End: 2024-11-24 | Stop reason: HOSPADM

## 2024-11-24 RX ORDER — PROPOFOL 10 MG/ML
INJECTION, EMULSION INTRAVENOUS PRN
Status: DISCONTINUED | OUTPATIENT
Start: 2024-11-24 | End: 2024-11-24

## 2024-11-24 RX ADMIN — ZINC OXIDE: 200 OINTMENT TOPICAL at 10:26

## 2024-11-24 RX ADMIN — PRAVASTATIN SODIUM 20 MG: 20 TABLET ORAL at 10:26

## 2024-11-24 RX ADMIN — PANTOPRAZOLE SODIUM 40 MG: 40 TABLET, DELAYED RELEASE ORAL at 10:41

## 2024-11-24 RX ADMIN — SODIUM CHLORIDE, POTASSIUM CHLORIDE, SODIUM LACTATE AND CALCIUM CHLORIDE: 600; 310; 30; 20 INJECTION, SOLUTION INTRAVENOUS at 06:09

## 2024-11-24 RX ADMIN — PROPOFOL 50 MG: 10 INJECTION, EMULSION INTRAVENOUS at 09:14

## 2024-11-24 RX ADMIN — PROPOFOL 50 MG: 10 INJECTION, EMULSION INTRAVENOUS at 09:10

## 2024-11-24 RX ADMIN — INSULIN ASPART 1 UNITS: 100 INJECTION, SOLUTION INTRAVENOUS; SUBCUTANEOUS at 10:42

## 2024-11-24 RX ADMIN — PROPOFOL 50 MG: 10 INJECTION, EMULSION INTRAVENOUS at 09:06

## 2024-11-24 RX ADMIN — PROPOFOL 50 MG: 10 INJECTION, EMULSION INTRAVENOUS at 09:25

## 2024-11-24 RX ADMIN — LISINOPRIL 10 MG: 10 TABLET ORAL at 10:26

## 2024-11-24 RX ADMIN — PROPOFOL 20 MG: 10 INJECTION, EMULSION INTRAVENOUS at 08:56

## 2024-11-24 RX ADMIN — PROPOFOL 50 MG: 10 INJECTION, EMULSION INTRAVENOUS at 09:19

## 2024-11-24 RX ADMIN — ESCITALOPRAM OXALATE 20 MG: 10 TABLET ORAL at 10:26

## 2024-11-24 RX ADMIN — PROPOFOL 50 MG: 10 INJECTION, EMULSION INTRAVENOUS at 09:01

## 2024-11-24 RX ADMIN — LIDOCAINE HYDROCHLORIDE 100 MG: 20 INJECTION, SOLUTION INFILTRATION; PERINEURAL at 08:54

## 2024-11-24 RX ADMIN — PROPOFOL 80 MG: 10 INJECTION, EMULSION INTRAVENOUS at 08:54

## 2024-11-24 RX ADMIN — INSULIN ASPART 1 UNITS: 100 INJECTION, SOLUTION INTRAVENOUS; SUBCUTANEOUS at 08:18

## 2024-11-24 RX ADMIN — SODIUM CHLORIDE, POTASSIUM CHLORIDE, SODIUM LACTATE AND CALCIUM CHLORIDE: 600; 310; 30; 20 INJECTION, SOLUTION INTRAVENOUS at 09:29

## 2024-11-24 ASSESSMENT — ACTIVITIES OF DAILY LIVING (ADL)
ADLS_ACUITY_SCORE: 0

## 2024-11-24 NOTE — DISCHARGE INSTRUCTIONS
You will need to follow up with the general surgery department this week to get biopsy and stool sample results and assess your progress after your hospital discharge. Please call the clinic tomorrow at [phone] to schedule this appointment.

## 2024-11-24 NOTE — ANESTHESIA PREPROCEDURE EVALUATION
Anesthesia Pre-Procedure Evaluation    Patient: Dirk Terrell   MRN: [id number] : 1955        Procedure : Procedure(s):  ESOPHAGOGASTRODUODENOSCOPY, WITH BIOPSY  COLONOSCOPY          Past Medical History:   Diagnosis Date     Cancer (H)     skin cancer on lip     Depressive disorder      Diabetes (H) 2017     Gout 2014    history of gout      Hypertension 2017      Past Surgical History:   Procedure Laterality Date     BIOPSY       COLONOSCOPY N/A 2016    one polyp was told to repeat in 5 years     CYSTOSCOPY,+URETEROSCOPY      stent     GENITOURINARY SURGERY       ORTHOPEDIC SURGERY      Left rotator cuff surgery     ORTHOPEDIC SURGERY      left rotator cuff surgery     TONSILLECTOMY  1963      No Known Allergies   Social History     Tobacco Use     Smoking status: Former     Current packs/day: 0.00     Average packs/day: 1.5 packs/day for 10.0 years (15.0 ttl pk-yrs)     Types: Cigarettes     Start date: 1978     Quit date: 1988     Years since quittin.3     Smokeless tobacco: Never   Substance Use Topics     Alcohol use: Yes     Comment: 12/week      Wt Readings from Last 1 Encounters:   24 91 kg (200 lb 9.9 oz)        Anesthesia Evaluation   Pt has had prior anesthetic.     No history of anesthetic complications       ROS/MED HX  ENT/Pulmonary:     (+) sleep apnea, uses CPAP,                                      Neurologic: Comment: Arnold-Chiari malformation      Cardiovascular:     (+)  hypertension- -   -  - -                                      METS/Exercise Tolerance:     Hematologic:  - neg hematologic  ROS     Musculoskeletal: Comment: gout      GI/Hepatic:     (+)       Inflammatory bowel disease, bowel prep,            Renal/Genitourinary:  - neg Renal ROS     Endo:     (+) type I DM, type II DM,             Obesity,       Psychiatric/Substance Use:     (+) psychiatric history depression       Infectious Disease:  - neg infectious disease ROS    "  Malignancy:  - neg malignancy ROS     Other:  - neg other ROS          Physical Exam    Airway  airway exam normal      Mallampati: II   TM distance: > 3 FB   Neck ROM: full   Mouth opening: > 3 cm    Respiratory Devices and Support         Dental       (+) Minor Abnormalities - some fillings, tiny chips      Cardiovascular   cardiovascular exam normal       Rhythm and rate: regular and normal     Pulmonary           breath sounds clear to auscultation       OUTSIDE LABS:  CBC:   Lab Results   Component Value Date    WBC 4.3 11/24/2024    WBC 7.5 11/23/2024    HGB 12.6 (L) 11/24/2024    HGB 15.0 11/23/2024    HCT 35.7 (L) 11/24/2024    HCT 43.1 11/23/2024     (L) 11/24/2024     11/23/2024     BMP:   Lab Results   Component Value Date     (L) 11/24/2024     (L) 11/23/2024    POTASSIUM 4.3 11/24/2024    POTASSIUM 4.4 11/23/2024    CHLORIDE 98 11/24/2024    CHLORIDE 95 (L) 11/23/2024    CO2 24 11/24/2024    CO2 26 11/23/2024    BUN 22.7 11/24/2024    BUN 29.6 (H) 11/23/2024    CR 1.06 11/24/2024    CR 1.22 (H) 11/23/2024     (H) 11/24/2024     (H) 11/24/2024     COAGS: No results found for: \"PTT\", \"INR\", \"FIBR\"  POC: No results found for: \"BGM\", \"HCG\", \"HCGS\"  HEPATIC:   Lab Results   Component Value Date    ALBUMIN 3.8 11/24/2024    PROTTOTAL 5.9 (L) 11/24/2024    ALT 20 11/24/2024    AST 37 11/24/2024    ALKPHOS 48 11/24/2024    BILITOTAL 0.8 11/24/2024     OTHER:   Lab Results   Component Value Date    LACT 1.7 11/23/2024    A1C 7.1 (H) 06/10/2024    TUAN 9.2 11/24/2024    LIPASE 18 11/23/2024    TSH 3.91 06/10/2024       Anesthesia Plan    ASA Status:  2    NPO Status:  NPO Appropriate    Anesthesia Type: MAC.     - Reason for MAC: chronic cardiopulmonary disease, straight local not clinically adequate              Consents    Anesthesia Plan(s) and associated risks, benefits, and realistic alternatives discussed. Questions answered and patient/representative(s) expressed " "understanding.     - Discussed: Risks, Benefits and Alternatives for BOTH SEDATION and the PROCEDURE were discussed     - Discussed with:  Patient      - Extended Intubation/Ventilatory Support Discussed: No.      - Patient is DNR/DNI Status: No     Use of blood products discussed: No .     Postoperative Care            Comments:               GEE Lau CRNA    I have reviewed the pertinent notes and labs in the chart from the past 30 days and (re)examined the patient.  Any updates or changes from those notes are reflected in this note.     # Hyponatremia: Lowest Na = 133 mmol/L in last 2 days, will monitor as appropriate  # Hypochloremia: Lowest Cl = 95 mmol/L in last 2 days, will monitor as appropriate        # Thrombocytopenia: Lowest platelets = 111 in last 2 days, will monitor for bleeding   # Hypertension: Noted on problem list          # DMII: A1C = N/A within past 6 months    # Obesity: Estimated body mass index is 32.38 kg/m  as calculated from the following:    Height as of this encounter: 1.676 m (5' 6\").    Weight as of this encounter: 91 kg (200 lb 9.9 oz).             "

## 2024-11-24 NOTE — PLAN OF CARE
Plan of Care Reviewed With: patient     Overall Patient Progress: improving per pt abdominal pain wise, pt to have EGD and Colonoscopy done today     Pt A&O x 4. C/O abd pain at a 4-5/10, denies any need for intervention. Abdomen remains rounded, firm, and tender to palpation, but actually appears to be less firm/distended this am. Pt also stated he thought it appeared better and felt better, too. Bowel prep completed and pt tolerated it well. NPO since 0000, BM's clear. BG checks completed, and Novolog coverage given, see MAR. Up ad maya to BR throughout the night. Pt stated his last BM was around 0300. Pt aware he needs to take a shower, and prep self for surgery. IV remains infusing LR at 100ml/hr. Able to make needs known, call light in reach, VS and assessment as charted.    Face to face report given with opportunity to observe patient.    Report given to Trini Dodd RN   11/24/2024  7:02 AM

## 2024-11-24 NOTE — ANESTHESIA POSTPROCEDURE EVALUATION
Patient: Dirk Terrell    Procedure: Procedure(s):  ESOPHAGOGASTRODUODENOSCOPY, WITH BIOPSY  COLONOSCOPY with polypectomy       Anesthesia Type:  MAC    Note:  Disposition: Inpatient   Postop Pain Control: Uneventful            Sign Out: Well controlled pain   PONV: No   Neuro/Psych: Uneventful            Sign Out: Acceptable/Baseline neuro status   Airway/Respiratory: Uneventful            Sign Out: Acceptable/Baseline resp. status   CV/Hemodynamics: Uneventful            Sign Out: Acceptable CV status; No obvious hypovolemia; No obvious fluid overload   Other NRE: NONE   DID A NON-ROUTINE EVENT OCCUR? No         Last vitals:  Vitals Value Taken Time   /66 11/24/24 0955   Temp     Pulse 62 11/24/24 0955   Resp 16 11/24/24 0950   SpO2 96 % 11/24/24 0955   Vitals shown include unfiled device data.    Electronically Signed By: GEE Lau CRNA  November 24, 2024  10:03 AM

## 2024-11-24 NOTE — PROGRESS NOTES
"[facility]    Medicine Progress Note - Hospitalist Service    Date of Admission:  11/23/2024    Assessment & Plan   Dirk Terrell is a 69 year old male admitted on 11/23/2024.   Principal Problem:    Enteritis  Abdominal pain, unspecified abdominal location  CRP elevation  Low grade fevers   FOB positive  CT abd pelvis done in er showing acute enteritis affecting distal ilium   Undergoing EGD and colonoscopy today   EGD showed  Esophagitis, Antritis, 4mm sessile polyp at hepatic flexure and of the ascending colon   HE is on protonix    ENMA resolved   Suspect pre rental  Was on IV fluids    Thrombocytopenia   - monitor    Active Problems:  Arnold-Chiari malformation (H)  Noted    Pseudohyponatremia  Na 133 Glucose 269  Monitor sodium    Depression, major   Home lexapro    Controlled type 2 diabetes mellitus without complication, without long-term current use of insulin (H)   Iss  Monitor poct glucose    Essential hypertension   Home lisinopril    Pure hypercholesterolemia  Home statin    Obesity  Diet and exercise    RBUIN (obstructive sleep apnea)- severe (AHI 36)    Ok for home cpap at hs                 Diet: NPO per Anesthesia Guidelines for Procedure/Surgery Except for: Meds    DVT Prophylaxis: Defer to primary service  Charles Catheter: Not present  Lines: None     Cardiac Monitoring: None  Code Status: Full Code      Clinically Significant Risk Factors Present on Admission         # Hyponatremia: Lowest Na = 133 mmol/L in last 2 days, will monitor as appropriate  # Hypochloremia: Lowest Cl = 95 mmol/L in last 2 days, will monitor as appropriate        # Thrombocytopenia: Lowest platelets = 111 in last 2 days, will monitor for bleeding   # Hypertension: Noted on problem list          # DMII: A1C = N/A within past 6 months    # Obesity: Estimated body mass index is 32.38 kg/m  as calculated from the following:    Height as of this encounter: 1.676 m (5' 6\").    Weight as of this encounter: 91 kg (200 " lb 9.9 oz).              Social Drivers of Health    Depression: Not at risk (7/8/2024)    PHQ-2     PHQ-2 Score: 2   Recent Concern: Depression - At risk (6/10/2024)    PHQ-2     PHQ-2 Score: 5   Tobacco Use: Medium Risk (7/8/2024)    Patient History     Smoking Tobacco Use: Former     Smokeless Tobacco Use: Never          Disposition Plan     Medically Ready for Discharge: Anticipated Tomorrow             Lukasz Craig DO  Hospitalist Service  [facility]  Securely message with [employer] (more info)  Text page via [facility] Paging/Directory   ______________________________________________________________________    Interval History   Patient was seen this morning for medical rounds. No chest pain or shortness of breath.  Undergoing egd and colonoscopy.       Physical Exam   Vital Signs: Temp: 98.8  F (37.1  C) Temp src: Temporal BP: 138/66 Pulse: 62   Resp: 16 SpO2: 96 % O2 Device: None (Room air)    Weight: 200 lbs 9.9 oz    Physical Exam  HENT:      Right Ear: External ear normal.      Left Ear: External ear normal.      Nose: Nose normal.      Mouth/Throat:      Pharynx: Oropharynx is clear.   Eyes:      Conjunctiva/sclera: Conjunctivae normal.   Cardiovascular:      Rate and Rhythm: Normal rate.   Pulmonary:      Effort: Pulmonary effort is normal.   Abdominal:      General: Abdomen is flat.   Musculoskeletal:         General: No swelling.   Skin:     Coloration: Skin is not jaundiced.   Neurological:      Mental Status: He is alert. Mental status is at baseline.         Medical Decision Making       65 MINUTES SPENT BY ME on the date of service doing chart review, history, exam, documentation & further activities per the note.      Data     I have personally reviewed the following data over the past 24 hrs:    4.3  \   12.6 (L)   / 111 (L)     133 (L) 98 22.7 /  199 (H)   4.3 24 1.06 \     ALT: 20 AST: 37 AP: 48 TBILI: 0.8   ALB: 3.8 TOT PROTEIN: 5.9 (L) LIPASE: 18     Procal: N/A CRP: 22.14 (H)  Lactic Acid: 1.7         Imaging results reviewed over the past 24 hrs:   Recent Results (from the past 24 hours)   CT Abdomen Pelvis w Contrast    Narrative    CT ABDOMEN PELVIS W CONTRAST    CLINICAL HISTORY: Male, age 69 years,  abdominal pain, kind of all  over, but epigastric and diffuse lower.;    Comparison:  No relevant prior imaging.    TECHNIQUE:  CT scanwas performed of the abdomen and pelvis with IV  contrast. Axial, sagittal and coronal images were reviewed.  This facility minimizes radiation dose by adjusting the mA and/or kV  according to each patient size.  This CT scan was performed using one or more the following dose  reduction techniques:  -Automated exposure control,  -Adjustment of the mA and/or kV according to patient's size, and/or,  -Use of iterative reconstruction technique.      FINDINGS:  The lung bases demonstrate peripheral areas of atelectasis. Visualized  portions of the heart are unremarkable.    Stomach and duodenum: No acute abnormality. Small hiatal hernia.    Liver: Nodular cirrhosis. No focal lesion.    Gallbladder: Unremarkable.    Spleen: Unremarkable.    Pancreas: Unremarkable.    Adrenal glands: Unremarkable.    Kidneys: 14 mm radiodense stone in one of the upper pole calyces of  the right kidney. There are a few low dense lesions of the kidneys are  too small to characterize suggesting cortical and parapelvic cysts.    Ureters: Unremarkable.    Urinary bladder: Unremarkable.    Large and small bowel: There is circumferential wall thickening  involving a long segment of the distal colon extending into and  involving the terminal ileum. There is no apparent perforation. No  evidence of abscess. A small volume of fluid is seen within the  peritoneal cavity, accumulating in the paracolic gutters.    Abdominal aorta and inferior vena cava taper normally. Retroperitoneal  and mesenteric nodes are normal in size. Small volume of fluid extends  into the lower pelvis. Superior  mesenteric artery is patent. There is  no evidence to suggest vascular compromise to the bowel.    Bony structures: No acute abnormality. Moderate degenerative changes  are seen throughout the lumbar spine.        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    Nodular cirrhosis of the liver with small volume of ascites in the  upper abdomen and lower pelvis.    This report is in agreement with the preliminary report.    JOSE ANTONIO GUILLEN MD         SYSTEM ID:  [id number]

## 2024-11-24 NOTE — OP NOTE
.REPORT OF OPERATION  DATE OF PROCEDURE: 11/24/2024    PATIENT: Dirk Terrell    SURGERY PERFORMED: EGD with cold biopsies, Colonoscopy with cold biopsy polyp removal    PREOPERATIVE DIAGNOSIS: Gastroenteritis, hematochezia, early satiety    POSTOPERATIVE DIAGNOSIS: Esophagitis, Antritis, 4mm sessile polyp at hepatic flexure and of the ascending colon    SURGEON: Bernice Carias MD    ANESTHESIA: Monitored Anesthesia Care    COMPLICATIONS: None apparent    TRANSFUSIONS: None    TISSUE TO PATHOLOGY: GEJ, antral and duodenal biopsies,  Polyps from ascending colon and hepatic flexure    FINDINGS: Patient had esophagitis at the GE junction, the amount of antral edema is significant that the pylorus was very difficult to enter, the duodenum mucosa itself was not significantly edematous.  Colon appeared to be relatively normal and no evidence of inflammatory bowel's disease.  Multiple attempts at intubating the terminal ileum failed.  2 polyps of the right side of the colon were removed using cold biopsy forceps and sent for pathologic valuation.    INDICATIONS: This is a 69 year old male with a history of irritable bowel syndrome.  He had early satiety 5 days ago has not been able to eat or drink much.  He presented to the emergency department yesterday with abdominal distention, bloating and evidence of ascites on CT scan.  In addition, the CT scan findings were concerning for gastroenteritis but because of the hematochezia we elected to proceed with upper and lower endoscopies today for further evaluation.    DESCRIPTIONS OF PROCEDURE IN DETAIL: After consent was obtained the patient was taken to the operative suite and matt in the left lateral decubitus position the patient was identified and the correct patient was confirmed.  Monitored Anesthesia Care was administered by anesthesia.  A bite block was placed and a towel was placed over the eyes.  Nonsensical presents cautiously through the upper, mid to distal  esophagus.  GE junction measures 41 cm from the teeth.  Were able to advance into the gastric lumen and we were able to insufflate the gastric lumen without significant difficulty.  We will did find it difficult to pass into the duodenum.  The pylorus appeared to be quite hyperemic and edematous.  Did not appear to be a neoplastic process.  Patient had antritis.  Ultimately, we were able to advance to endoscope through the swollen, nonscarred pylorus into the duodenum.  Random biopsies of the duodenum were sent for pathologic valuation.  We pulled the scope back and multiple biopsies of the edematous, hyperemic, hyperplastic tissue was sent for pathologic valuation as well.  We then retroflexed the scope in the gastric lumen, patient has a very small hiatal hernia without evidence of ulcers or masses.  Several biopsies of the GE junction were sent for pathologic evaluation.  This was directed using chromo- illumination.    We then flip-flopped the patient.  He was placed in a more comfortable position with pillows and padding.  The perianal area was quite excoriated.  It was inflamed and there were several droplets of blood near the BAL anal region.  Lubricated finger was placed and patient's prostate appeared to be normal for his age.  Olympus endoscope since cautiously through the rectum, sigmoid colon, across the transverse colon and ultimately to the cecum.  The cecum was identified by the coalescence of the tenia coli and the ileocecal valve.  Multiple attempts at intubating the terminal ileum failed.  Position changes and reconfiguration also feel to enter the terminal ileum.  Patient did have a 4 mm adenomatous appearing polyp of the ascending colon that was removed using cold biopsy forceps.  In a similar fashion a 4 mm polyp at the hepatic flexure was removed and sent in a separate container for pathologic valuation.  The scope was gradually retracted, no other masses or lesions were seen.  There did not  appear to be any type of aphthous ulcers or inflammatory changes of the colon.  We did retroflex this scope in the rectum no other masses or lesions were seen.  The scope was removed, the colon was deflated patient tolerated the procedure well and was transferred to the floor in stable condition.

## 2024-11-24 NOTE — PROGRESS NOTES
.INPATIENT ROUNDING NOTE  11/24/2024    Patient: Dirk Terrell    Physician of Record:  Bernice Carias MD    Admitting diagnosis: Hematochezia [K92.1]  Enteritis [K52.9]  Abdominal pain, unspecified abdominal location [R10.9]    Reason for Admission: unable to tolerate food and liquids     Length of stay: 1    Current Diet: NPO    CURRENT MEDICATIONS:  Continuous Medications:  Current Facility-Administered Medications   Medication Dose Route Frequency Provider Last Rate Last Admin    bisacodyl (DULCOLAX) EC tablet 10 mg  10 mg Oral Daily PRN Bernice Carias MD   10 mg at 11/23/24 1806    calcium carbonate (TUMS) chewable tablet 1,000 mg  1,000 mg Oral 4x Daily PRN Bernice Carias MD        glucose gel 15-30 g  15-30 g Oral Q15 Min PRN Lukasz Craig DO        Or    dextrose 50 % injection 25-50 mL  25-50 mL Intravenous Q15 Min PRN Lukasz Craig DO        Or    glucagon injection 1 mg  1 mg Subcutaneous Q15 Min PRN Lukasz Craig DO        escitalopram (LEXAPRO) tablet 20 mg  20 mg Oral Daily Bernice Carias MD        HYDROmorphone (PF) (DILAUDID) injection 0.5 mg  0.5 mg Intravenous Q30 Min PRN Tristin Garcia MD        indomethacin (INDOCIN) capsule 50 mg  50 mg Oral TID PRN Bernice Carias MD        insulin aspart (NovoLOG) injection (RAPID ACTING)  1-3 Units Subcutaneous TID AC Lukasz Craig DO        insulin aspart (NovoLOG) injection (RAPID ACTING)  1-3 Units Subcutaneous At Bedtime Lukasz Craig DO   1 Units at 11/23/24 2206    lactated ringers infusion   Intravenous Continuous Bernice Carias  mL/hr at 11/24/24 0609 New Bag at 11/24/24 0609    lidocaine (LMX4) cream   Topical Q1H PRN Bernice Carias MD        lidocaine (LMX4) cream   Topical Q1H PRN Bernice Carias MD        lidocaine 1 % 0.1-1 mL  0.1-1 mL Other Q1H PRN Bernice Carias MD        lidocaine 1 % 0.1-1 mL  0.1-1 mL Other Q1H PRN Bernice Carias MD         lisinopril (ZESTRIL) tablet 10 mg  10 mg Oral Daily Bernice Carias MD        naloxone (NARCAN) injection 0.2 mg  0.2 mg Intravenous Q2 Min PRN Bernice Carias MD        Or    naloxone (NARCAN) injection 0.4 mg  0.4 mg Intravenous Q2 Min PRN Bernice Carias MD        Or    naloxone (NARCAN) injection 0.2 mg  0.2 mg Intramuscular Q2 Min PRN Bernice Carias MD        Or    naloxone (NARCAN) injection 0.4 mg  0.4 mg Intramuscular Q2 Min PRN Bernice Carias MD        ondansetron (ZOFRAN) injection 4 mg  4 mg Intravenous Q30 Min PRN Tristin Garcia MD        pravastatin (PRAVACHOL) tablet 20 mg  20 mg Oral Daily Bernice Carias MD        senna-docusate (SENOKOT-S/PERICOLACE) 8.6-50 MG per tablet 1 tablet  1 tablet Oral BID PRN Bernice Carias MD        Or    senna-docusate (SENOKOT-S/PERICOLACE) 8.6-50 MG per tablet 2 tablet  2 tablet Oral BID PRN Bernice Carias MD        sodium chloride (PF) 0.9% PF flush 3 mL  3 mL Intracatheter Q8H Bernice Carias MD   3 mL at 11/23/24 1758    sodium chloride (PF) 0.9% PF flush 3 mL  3 mL Intracatheter q1 min prn Bernice Carias MD        sodium chloride (PF) 0.9% PF flush 3 mL  3 mL Intracatheter Q8H Bernice Carias MD        sodium chloride (PF) 0.9% PF flush 3 mL  3 mL Intracatheter q1 min prn Bernice Carias MD           Scheduled Medications:  Current Facility-Administered Medications   Medication Dose Route Frequency Provider Last Rate Last Admin    bisacodyl (DULCOLAX) EC tablet 10 mg  10 mg Oral Daily PRN Bernice Carias MD   10 mg at 11/23/24 1806    calcium carbonate (TUMS) chewable tablet 1,000 mg  1,000 mg Oral 4x Daily PRN Bernice Carias MD        glucose gel 15-30 g  15-30 g Oral Q15 Min PRN Lukasz Craig DO        Or    dextrose 50 % injection 25-50 mL  25-50 mL Intravenous Q15 Min PRN Lukasz Craig,         Or    glucagon injection 1 mg  1 mg Subcutaneous Q15 Min PRN Lukasz Craig, DO        escitalopram (LEXAPRO)  tablet 20 mg  20 mg Oral Daily Bernice Carias MD        HYDROmorphone (PF) (DILAUDID) injection 0.5 mg  0.5 mg Intravenous Q30 Min PRN Tristin Garcia MD        indomethacin (INDOCIN) capsule 50 mg  50 mg Oral TID PRN Bernice Carias MD        insulin aspart (NovoLOG) injection (RAPID ACTING)  1-3 Units Subcutaneous TID AC Lukasz Craig DO        insulin aspart (NovoLOG) injection (RAPID ACTING)  1-3 Units Subcutaneous At Bedtime Lukasz Craig DO   1 Units at 11/23/24 2206    lactated ringers infusion   Intravenous Continuous Bernice Carias  mL/hr at 11/24/24 0609 New Bag at 11/24/24 0609    lidocaine (LMX4) cream   Topical Q1H PRN Bernice Carias MD        lidocaine (LMX4) cream   Topical Q1H PRN Bernice Carias MD        lidocaine 1 % 0.1-1 mL  0.1-1 mL Other Q1H PRN Bernice Carias MD        lidocaine 1 % 0.1-1 mL  0.1-1 mL Other Q1H PRN Bernice Carias MD        lisinopril (ZESTRIL) tablet 10 mg  10 mg Oral Daily Bernice Carias MD        naloxone (NARCAN) injection 0.2 mg  0.2 mg Intravenous Q2 Min PRN Bernice Carias MD        Or    naloxone (NARCAN) injection 0.4 mg  0.4 mg Intravenous Q2 Min PRN Bernice Carias MD        Or    naloxone (NARCAN) injection 0.2 mg  0.2 mg Intramuscular Q2 Min PRN Bernice Carias MD        Or    naloxone (NARCAN) injection 0.4 mg  0.4 mg Intramuscular Q2 Min PRN Bernice Carias MD        ondansetron (ZOFRAN) injection 4 mg  4 mg Intravenous Q30 Min PRN Tristin Garcia MD        pravastatin (PRAVACHOL) tablet 20 mg  20 mg Oral Daily Bernice Carias MD        senna-docusate (SENOKOT-S/PERICOLACE) 8.6-50 MG per tablet 1 tablet  1 tablet Oral BID PRN Bernice Carias MD        Or    senna-docusate (SENOKOT-S/PERICOLACE) 8.6-50 MG per tablet 2 tablet  2 tablet Oral BID PRN Bernice Carias MD        sodium chloride (PF) 0.9% PF flush 3 mL  3 mL Intracatheter Q8H Bernice Carias MD   3 mL at 11/23/24 1758    sodium chloride (PF) 0.9% PF flush 3 mL   3 mL Intracatheter q1 min prn Bernice Carias MD        sodium chloride (PF) 0.9% PF flush 3 mL  3 mL Intracatheter Q8H Bernice Carias MD        sodium chloride (PF) 0.9% PF flush 3 mL  3 mL Intracatheter q1 min prn Bernice Carias MD           PRN Medications:  Current Facility-Administered Medications   Medication Dose Route Frequency Provider Last Rate Last Admin    bisacodyl (DULCOLAX) EC tablet 10 mg  10 mg Oral Daily PRN Bernice Carias MD   10 mg at 11/23/24 1806    calcium carbonate (TUMS) chewable tablet 1,000 mg  1,000 mg Oral 4x Daily PRN Bernice Carias MD        glucose gel 15-30 g  15-30 g Oral Q15 Min PRN Lukasz Craig DO        Or    dextrose 50 % injection 25-50 mL  25-50 mL Intravenous Q15 Min PRN Lukasz Craig DO        Or    glucagon injection 1 mg  1 mg Subcutaneous Q15 Min PRN Lukasz Craig DO        escitalopram (LEXAPRO) tablet 20 mg  20 mg Oral Daily Bernice Carias MD        HYDROmorphone (PF) (DILAUDID) injection 0.5 mg  0.5 mg Intravenous Q30 Min PRN Tristin Garcia MD        indomethacin (INDOCIN) capsule 50 mg  50 mg Oral TID PRN Bernice Carias MD        insulin aspart (NovoLOG) injection (RAPID ACTING)  1-3 Units Subcutaneous TID AC Lukasz Craig DO        insulin aspart (NovoLOG) injection (RAPID ACTING)  1-3 Units Subcutaneous At Bedtime Lukasz Craig DO   1 Units at 11/23/24 2206    lactated ringers infusion   Intravenous Continuous Bernice Carias  mL/hr at 11/24/24 0609 New Bag at 11/24/24 0609    lidocaine (LMX4) cream   Topical Q1H PRN Bernice Carias MD        lidocaine (LMX4) cream   Topical Q1H PRN Bernice Carias MD        lidocaine 1 % 0.1-1 mL  0.1-1 mL Other Q1H PRN Bernice Carias MD        lidocaine 1 % 0.1-1 mL  0.1-1 mL Other Q1H PRN Bernice Carias MD        lisinopril (ZESTRIL) tablet 10 mg  10 mg Oral Daily Bernice Carias MD        naloxone (NARCAN) injection 0.2 mg  0.2  "mg Intravenous Q2 Min PRN Bernice Carias MD        Or    naloxone (NARCAN) injection 0.4 mg  0.4 mg Intravenous Q2 Min PRN Bernice Carias MD        Or    naloxone (NARCAN) injection 0.2 mg  0.2 mg Intramuscular Q2 Min PRN Bernice Carias MD        Or    naloxone (NARCAN) injection 0.4 mg  0.4 mg Intramuscular Q2 Min PRN Bernice Carias MD        ondansetron (ZOFRAN) injection 4 mg  4 mg Intravenous Q30 Min PRN Tristin Garcia MD        pravastatin (PRAVACHOL) tablet 20 mg  20 mg Oral Daily Berince Carias MD        senna-docusate (SENOKOT-S/PERICOLACE) 8.6-50 MG per tablet 1 tablet  1 tablet Oral BID PRN Bernice Carias MD        Or    senna-docusate (SENOKOT-S/PERICOLACE) 8.6-50 MG per tablet 2 tablet  2 tablet Oral BID PRN Bernice Carias MD        sodium chloride (PF) 0.9% PF flush 3 mL  3 mL Intracatheter Q8H Bernice Carias MD   3 mL at 11/23/24 1758    sodium chloride (PF) 0.9% PF flush 3 mL  3 mL Intracatheter q1 min prn Bernice Carias MD        sodium chloride (PF) 0.9% PF flush 3 mL  3 mL Intracatheter Q8H Bernice Carias MD        sodium chloride (PF) 0.9% PF flush 3 mL  3 mL Intracatheter q1 min prn Bernice Carias MD           SUBJECTIVE:   Patient had a decent night last night.  He is feeling slightly better than he did yesterday.  There has not been any blood in the stool during his bowel prep.  His abdomen feels less distended and less pressure.  We had a discussion about the pending stool cultures and his labs this morning.  We are awaiting to transfer him to endoscopy suite for his upper and lower endoscopies this morning.    PHYSICAL EXAM:   Vital signs: /65   Pulse 62   Temp 98.7  F (37.1  C) (Tympanic)   Resp 14   Ht 1.676 m (5' 6\")   Wt 91 kg (200 lb 9.9 oz)   SpO2 93%   BMI 32.38 kg/m     Weight: [unfilled]   BMI: Body mass index is 32.38 kg/m .   General: cooperative, in no acute distress, alert   Lungs: clear to auscultation, without wheezes, without crackles   CV: Regular rate and " rhythm without murmer   Abdominal: negative findings: no scars, striae, dilated veins, rashes, or lesions, no masses palpable, no organomegaly, bowel sounds normal, Non-tender, positive findings: distended   Extremities: No cyanosis, clubbing or edema noted bilaterally in Upper and Lower Extremities    INPUT/OUTPUT:      Intake/Output Summary (Last 24 hours) at 11/24/2024 0813  Last data filed at 11/24/2024 0744  Gross per 24 hour   Intake 1591 ml   Output --   Net 1591 ml       I/O last 3 completed shifts:  In: 1464 [P.O.:240; I.V.:1224]  Out: -     LABS:    Last CBC Rrsults:   Recent Labs   Lab Test 11/24/24  0604 11/23/24  1232 11/09/17  1112   WBC 4.3 7.5 8.1   RBC 3.89* 4.62 4.52   HGB 12.6* 15.0 14.5   HCT 35.7* 43.1 41.3   MCV 92 93 91   MCH 32.4 32.5 32.1   MCHC 35.3 34.8 35.1   RDW 12.3 12.3 12.3   * 158 153       Last Comprehensive Metabolic panel:  Recent Labs   Lab Test 11/24/24  0611 11/24/24  0604 11/23/24  2203 11/23/24  1700 11/23/24  1232 12/04/23  1520   NA  --  133*  --   --  133* 137   POTASSIUM  --  4.3  --   --  4.4 4.1   CHLORIDE  --  98  --   --  95* 100   CO2  --  24  --   --  26 26   ANIONGAP  --  11  --   --  12 11   * 185* 223*   < > 269* 137*   BUN  --  22.7  --   --  29.6* 27.6*   CR  --  1.06  --   --  1.22* 1.06   GFRESTIMATED  --  76  --   --  65 77   TUAN  --  9.2  --   --  10.0 9.5   BILITOTAL  --  0.8  --   --  1.1 0.8   ALKPHOS  --  48  --   --  59 48   ALT  --  20  --   --  20 29   AST  --  37  --   --  29 43    < > = values in this interval not displayed.       Recent Labs   Lab Test 11/24/24  0604 11/23/24  1232 12/04/23  1520   ALBUMIN 3.8 4.6 4.6       ASSESSMENT:    69 year old male admitted for Hematochezia [K92.1]  Enteritis [K52.9]  Abdominal pain, unspecified abdominal location [R10.9].  Here for endoscopic evaluation for the etiology of his ongoing symptoms. Hospital day 1.     Gastroenteritis, NOS    PLAN: We will plan to proceed with both upper and lower  endoscopies this morning.  The patient has been n.p.o., his IV fluids are running.  I have described both upper and lower endoscopy to the patient, described the goals of the procedure including the need for biopsies.  I described the plan for the patient following as well.  I have discussed the potential risks and complications of both upper and lower endoscopies to the patient.  These include but are not limited to injury to the esophagus, injury to the colon, bleeding, perforation, need for emergency surgery.  Patient understands potential risks and would like to proceed.  I will discuss the findings following the scope with the patient and anticipate that he would be able to be discharged once he is able to tolerate diet.

## 2024-11-24 NOTE — PLAN OF CARE
[facility] Inpatient Admission Note:    Patient admitted to 3210/3210-1 at approximately 1630 via wheel chair accompanied by transport tech from emergency room . Report received from [facility] in SBAR format at 1620 via telephone. Patient ambulated to bed via self.. Patient is alert and oriented X 3, reports pain; rates at 5 on 0-10 scale.  Patient oriented to room, unit, hourly rounding, and plan of care. Explained admission packet and patient handbook with patient bill of rights brochure. Will continue to monitor and document as needed.     Inpatient Nursing criteria listed below was met:    Health care directives status obtained and documented: Yes    Patient identifies a surrogate decision maker: Yes If yes, who: Brother Gigi Contact Information:[phone]       If initial lactic acid greater than 2.0, repeat lactic acid drawn within one hour of arrival to unit: NA. If no, state reason:    Clergy visit ordered if patient requests: N/A    Skin issues/needs documented: Yes    Isolation Patient: no    Fall Prevention No: Care plan updated, education given and documented, sticker and magnet in place: N/A    Care Plan initiated: Yes    Education Documented (including assessment): Yes

## 2024-11-24 NOTE — ANESTHESIA CARE TRANSFER NOTE
Patient: Dirk Terrell    Procedure: Procedure(s):  ESOPHAGOGASTRODUODENOSCOPY, WITH BIOPSY  COLONOSCOPY with polypectomy       Diagnosis: Enteritis [K52.9]  Hematochezia [K92.1]  Diagnosis Additional Information: No value filed.    Anesthesia Type:   MAC     Note:    Oropharynx: spontaneously breathing  Level of Consciousness: awake  Oxygen Supplementation: room air    Independent Airway: airway patency satisfactory and stable  Dentition: dentition unchanged  Vital Signs Stable: post-procedure vital signs reviewed and stable  Report to RN Given: handoff report given  Patient transferred to: Phase II    Handoff Report: Identifed the Patient, Identified the Reponsible Provider, Reviewed the pertinent medical history, Discussed the surgical course, Reviewed Intra-OP anesthesia mangement and issues during anesthesia, Set expectations for post-procedure period and Allowed opportunity for questions and acknowledgement of understanding    Vitals:  Vitals Value Taken Time   /66 11/24/24 0955   Temp     Pulse 62 11/24/24 0955   Resp 16 11/24/24 0950   SpO2 96 % 11/24/24 0955   Vitals shown include unfiled device data.    Electronically Signed By: GEE Lau CRNA  November 24, 2024  10:02 AM

## 2024-11-24 NOTE — PLAN OF CARE
Pt alert and oriented. VS and assessment as charted. Tolerating a full liquid diet without any pain or nausea. Up ambulating independently, voiding and BM wihtout issued post procedure. Cream applied to irritated rash buttocks per pt. Denies any light headedness/ dizziness. Pt asking if he can be discharged. Dr. Carias updated and plan to discharge this afternoon if pt still without S/S and has a designated ride home. Pt updated and in agreement with plan. Plan of care continues.

## 2024-11-24 NOTE — PLAN OF CARE
Pt alert and oriented x 3, pleasant and cooperative. VS and assessments as charted. Rates pain 5/10 to abd, denies need for interventions. Abd rounded, firm and tender to palpation. Up in room ad maya. Bowel prep started and pt tolerating well. Denies nausea. IV infusing LR @ 100/hr, clear liquids until midnight then NPO. Free from falls/injury, call light within reach, makes needs known.     Face to face report given with opportunity to observe patient.    Report given to EMMA Anthony RN   11/23/2024  7:41 PM

## 2024-11-24 NOTE — PLAN OF CARE
Goal Outcome Evaluation:      Plan of Care Reviewed With: patient    Overall Patient Progress: improvingOverall Patient Progress: improving    Patient discharged at 3:25 PM via ambulation accompanied by staff. Pt friend Jaleesa picked him up to transport home. Prescriptions sent to patients preferred pharmacy. All belongings sent with patient.     Discharge instructions reviewed with patient. Listed belongings gathered and returned to patient.  Pt confirms all belongings departing with pt.     Patient discharged to home.       Surgical Patient   Surgical Procedures during stay: yes  Did patient receive discharge instruction on wound care and recognition of infection symptoms? Yes    MISC  Follow up appointment made:  No- unable to schedule due to weekend discharge. Pt given information and told to call and schedule appointment tomorrow with general surgery department.   Home medications returned to patient: N/A  Patient reports pain was well managed at discharge: Yes

## 2024-11-24 NOTE — DISCHARGE SUMMARY
.INPATIENT DISCHARGE SUMMARY  11/24/2024    Patient'S Name: Dirk Terrell    Admitting Physician of Record: Bernice Carias MD    Discharging Physician: same    Date of admission: 11/23/2024     Date of discharge: 11/24/2024    Admitting diagnosis: Gastroenteritis, hematochezia    Discharge diagnosis: Same    Procedures: Procedure(s):  ESOPHAGOGASTRODUODENOSCOPY, WITH BIOPSY  COLONOSCOPY with polypectomy    Consultants: Medicine    Hospital course: The patient was admitted to the hospital, given a bowel prep and underwent an Procedure(s):  ESOPHAGOGASTRODUODENOSCOPY, WITH BIOPSY  COLONOSCOPY with polypectomy.  The patient tolerated the procedure(s) well and was transferred to the b[facility]akta.  His postoperative course has been completely unremarkable.  At the time of discharge he is eating a Full liquids diet, is having good pain control on oral medications, and is passing gas and is having bowel movements.  The patient will be discharged home in good condition.  Stool studies and biopsy results are pending.    Discharge instructions include:    Patient will be discharged to Home   Diet: Full liquids until seen in the Surgery Office later this week   Activity : no lifting restrictions   Follow-up:     The patient will follow up with his primary care provider as needed         Medications include:    All prior medications and I have added daily Prilosec 20mg po.  He will purchase this OTC

## 2024-11-26 LAB
PATH REPORT.COMMENTS IMP SPEC: NORMAL
PATH REPORT.FINAL DX SPEC: NORMAL
PATH REPORT.GROSS SPEC: NORMAL
PATH REPORT.MICROSCOPIC SPEC OTHER STN: NORMAL
PATH REPORT.RELEVANT HX SPEC: NORMAL
PHOTO IMAGE: NORMAL

## 2024-11-27 ENCOUNTER — OFFICE VISIT (OUTPATIENT)
Dept: SURGERY | Facility: OTHER | Age: 69
End: 2024-11-27
Attending: SURGERY
Payer: MEDICARE

## 2024-11-27 VITALS
OXYGEN SATURATION: 98 % | WEIGHT: 200 LBS | DIASTOLIC BLOOD PRESSURE: 74 MMHG | BODY MASS INDEX: 32.14 KG/M2 | SYSTOLIC BLOOD PRESSURE: 130 MMHG | RESPIRATION RATE: 15 BRPM | TEMPERATURE: 97.6 F | HEART RATE: 51 BPM | HEIGHT: 66 IN

## 2024-11-27 DIAGNOSIS — K74.60 HEPATIC CIRRHOSIS, UNSPECIFIED HEPATIC CIRRHOSIS TYPE, UNSPECIFIED WHETHER ASCITES PRESENT (H): Primary | ICD-10-CM

## 2024-11-27 PROCEDURE — G0463 HOSPITAL OUTPT CLINIC VISIT: HCPCS

## 2024-11-27 ASSESSMENT — PAIN SCALES - GENERAL: PAINLEVEL_OUTOF10: MILD PAIN (2)

## 2024-12-03 NOTE — PROGRESS NOTES
"[facility] Progress Note    HPI: \"This is a 69 year old male with a history of type 2 diabetes and essential hypertension.  He has obstructive sleep apnea as well.  Patient states that he started developing abdominal pressure and fullness about 5 days ago.  He really has not been able to eat or drink much.  He has been working through yogurt and toast.  In addition, he will be able to drink a little bit of water.  He for many years, has had loose stools and has had to take psyllium in order to thicken his bowels.  He was also having diarrhea earlier this week.  Just last night, he started passing blood in the stool.  This is the first time this has happened.  He did have a colonoscopy 8 years ago which he had 1 polyp and was told to repeat in 5 years.  He presented to the emergency department with abdominal bloating and it feels like \"a balloon\" is inside of me.  Laboratory was ordered, CT scan was ordered and physical exam was performed.  Because of these findings, the surgical service was consulted. \"    He had upper and lower endoscopy during admission.       S: His pain has significantly improved he denies fevers he has been moving his bowels. He is planning on going on a hunting trip in the next few days.     O:   Vitals:  /74 (BP Location: Right arm, Cuff Size: Adult Regular)   Pulse 51   Temp 97.6  F (36.4  C)   Resp 15   Ht 1.676 m (5' 6\")   Wt 90.7 kg (200 lb)   SpO2 98%   BMI 32.28 kg/m        Physical Exam:  G: alert oriented, no acute distress   ENT: sclera non-icteric   Pulm: no respiratory distress   CVS: RRR  ABD: soft non-tender non-distended   Ext: WWP     Assessment/Plan:  Inflammation of terminal ileum, question of cirrhosis on CT scan as well, fluid in abdomen unclear if this is inflammatory or possible ascites from portal hypertension. He continues to improve, discussed he can start advancing diet as tolerated. He has had negative infectious work up as well as negative " upper and lower endoscopy. Unfortunately was unable to be intubate terminal ileum during that scope according to report. Discussed that I think he would benefit from GI follow up for his cirrhosis for further work up as to a source possible they will have some insight into his most recent episode.      Hever Hendrix MD

## 2024-12-30 DIAGNOSIS — E11.9 CONTROLLED TYPE 2 DIABETES MELLITUS WITHOUT COMPLICATION, WITHOUT LONG-TERM CURRENT USE OF INSULIN (H): ICD-10-CM

## 2024-12-30 NOTE — TELEPHONE ENCOUNTER
Metformin 1000 mg       Last Written Prescription Date:  03/12/2024   Last Fill Quantity: 180,   # refills: 2  Last Office Visit: 07/08/2024  Future Office visit:       Routing refill request to provider for review/approval because:  Patient has documented A1c within the specified period of time.    If HgbA1C is 8 or greater, it needs to be on file within the past 3 months.  If less than 8, must be on file within the past 6 months.          Recent Labs   Lab Test 06/10/24  0909   A1C 7.1*

## 2025-01-23 ENCOUNTER — TRANSFERRED RECORDS (OUTPATIENT)
Dept: HEALTH INFORMATION MANAGEMENT | Facility: CLINIC | Age: 70
End: 2025-01-23

## 2025-01-29 ENCOUNTER — MYC MEDICAL ADVICE (OUTPATIENT)
Dept: FAMILY MEDICINE | Facility: OTHER | Age: 70
End: 2025-01-29

## 2025-01-29 ASSESSMENT — ANXIETY QUESTIONNAIRES
IF YOU CHECKED OFF ANY PROBLEMS ON THIS QUESTIONNAIRE, HOW DIFFICULT HAVE THESE PROBLEMS MADE IT FOR YOU TO DO YOUR WORK, TAKE CARE OF THINGS AT HOME, OR GET ALONG WITH OTHER PEOPLE: SOMEWHAT DIFFICULT
6. BECOMING EASILY ANNOYED OR IRRITABLE: SEVERAL DAYS
2. NOT BEING ABLE TO STOP OR CONTROL WORRYING: NOT AT ALL
7. FEELING AFRAID AS IF SOMETHING AWFUL MIGHT HAPPEN: SEVERAL DAYS
GAD7 TOTAL SCORE: 5
1. FEELING NERVOUS, ANXIOUS, OR ON EDGE: SEVERAL DAYS
3. WORRYING TOO MUCH ABOUT DIFFERENT THINGS: NOT AT ALL
8. IF YOU CHECKED OFF ANY PROBLEMS, HOW DIFFICULT HAVE THESE MADE IT FOR YOU TO DO YOUR WORK, TAKE CARE OF THINGS AT HOME, OR GET ALONG WITH OTHER PEOPLE?: SOMEWHAT DIFFICULT
7. FEELING AFRAID AS IF SOMETHING AWFUL MIGHT HAPPEN: SEVERAL DAYS
GAD7 TOTAL SCORE: 5
GAD7 TOTAL SCORE: 5
5. BEING SO RESTLESS THAT IT IS HARD TO SIT STILL: SEVERAL DAYS
4. TROUBLE RELAXING: SEVERAL DAYS

## 2025-01-29 NOTE — TELEPHONE ENCOUNTER
Patient completed [facility] PHQ-9/MARTY-7 on 1/29/2025 for Depression Remission quality patient outreach per [facility] guidelines. Noted most current PHQ-9 total score is remains unchanged but still mildly elevated and noted most current MARTY-7 total score is increased as well when compared to last completed assessments done on 12/192024.      If you checked off any problems, how difficult have these problems made it for you to do your work, take care of things at home, or get along with other people? Somewhat difficult       Current PHQ-9 question #9 is NEGATIVE for thoughts of self-harm or SI.       Total PHQ-9 score is <=10, so continue current plan of care.          7/7/2024     8:45 AM 12/19/2024     6:44 PM 1/29/2025    12:07 PM   PHQ   PHQ-9 Total Score 6 5  5    Q9: Thoughts of better off dead/self-harm past 2 weeks Not at all Not at all Not at all       Patient-reported          7/7/2024     8:45 AM 12/19/2024     6:44 PM 1/29/2025    12:07 PM   MARTY-7 SCORE   Total Score 5 (mild anxiety) 2 (minimal anxiety) 5 (mild anxiety)   Total Score 5 2  5        Patient-reported        Devorah Krause RN

## 2025-03-17 ENCOUNTER — LAB (OUTPATIENT)
Dept: LAB | Facility: OTHER | Age: 70
End: 2025-03-17
Payer: MEDICARE

## 2025-03-17 DIAGNOSIS — F33.2 SEVERE EPISODE OF RECURRENT MAJOR DEPRESSIVE DISORDER, WITHOUT PSYCHOTIC FEATURES (H): ICD-10-CM

## 2025-03-17 DIAGNOSIS — E11.9 TYPE 2 DIABETES MELLITUS WITHOUT COMPLICATION, WITHOUT LONG-TERM CURRENT USE OF INSULIN (H): ICD-10-CM

## 2025-03-17 DIAGNOSIS — Z12.5 SCREENING FOR PROSTATE CANCER: ICD-10-CM

## 2025-03-17 DIAGNOSIS — K76.0 HEPATIC STEATOSIS: ICD-10-CM

## 2025-03-17 LAB
ALBUMIN SERPL BCG-MCNC: 4.4 G/DL (ref 3.5–5.2)
ALP SERPL-CCNC: 68 U/L (ref 40–150)
ALT SERPL W P-5'-P-CCNC: 29 U/L (ref 0–70)
ANION GAP SERPL CALCULATED.3IONS-SCNC: 14 MMOL/L (ref 7–15)
AST SERPL W P-5'-P-CCNC: 30 U/L (ref 0–45)
BILIRUB SERPL-MCNC: 0.7 MG/DL
BUN SERPL-MCNC: 18.2 MG/DL (ref 8–23)
CALCIUM SERPL-MCNC: 10.1 MG/DL (ref 8.8–10.4)
CHLORIDE SERPL-SCNC: 97 MMOL/L (ref 98–107)
CHOLEST SERPL-MCNC: 181 MG/DL
CREAT SERPL-MCNC: 1.05 MG/DL (ref 0.67–1.17)
CRP SERPL-MCNC: <3 MG/L
EGFRCR SERPLBLD CKD-EPI 2021: 77 ML/MIN/1.73M2
ERYTHROCYTE [SEDIMENTATION RATE] IN BLOOD BY WESTERGREN METHOD: 13 MM/HR (ref 0–20)
EST. AVERAGE GLUCOSE BLD GHB EST-MCNC: 171 MG/DL
FASTING STATUS PATIENT QL REPORTED: YES
FASTING STATUS PATIENT QL REPORTED: YES
GLUCOSE SERPL-MCNC: 193 MG/DL (ref 70–99)
HBA1C MFR BLD: 7.6 %
HCO3 SERPL-SCNC: 25 MMOL/L (ref 22–29)
HDLC SERPL-MCNC: 50 MG/DL
LDLC SERPL CALC-MCNC: 81 MG/DL
NONHDLC SERPL-MCNC: 131 MG/DL
POTASSIUM SERPL-SCNC: 4.3 MMOL/L (ref 3.4–5.3)
PROT SERPL-MCNC: 7.4 G/DL (ref 6.4–8.3)
PSA SERPL DL<=0.01 NG/ML-MCNC: 0.84 NG/ML (ref 0–4.5)
SODIUM SERPL-SCNC: 136 MMOL/L (ref 135–145)
TRIGL SERPL-MCNC: 252 MG/DL

## 2025-03-17 PROCEDURE — 85652 RBC SED RATE AUTOMATED: CPT | Mod: ZL

## 2025-03-17 PROCEDURE — 86140 C-REACTIVE PROTEIN: CPT | Mod: ZL

## 2025-03-17 PROCEDURE — 83036 HEMOGLOBIN GLYCOSYLATED A1C: CPT | Mod: ZL

## 2025-03-17 PROCEDURE — 82565 ASSAY OF CREATININE: CPT | Mod: ZL

## 2025-03-17 PROCEDURE — 36415 COLL VENOUS BLD VENIPUNCTURE: CPT | Mod: ZL

## 2025-03-17 PROCEDURE — 83718 ASSAY OF LIPOPROTEIN: CPT | Mod: ZL

## 2025-03-17 PROCEDURE — G0103 PSA SCREENING: HCPCS | Mod: ZL

## 2025-03-17 PROCEDURE — 82435 ASSAY OF BLOOD CHLORIDE: CPT | Mod: ZL

## 2025-03-17 PROCEDURE — 84155 ASSAY OF PROTEIN SERUM: CPT | Mod: ZL

## 2025-03-17 PROCEDURE — 82465 ASSAY BLD/SERUM CHOLESTEROL: CPT | Mod: ZL

## 2025-03-17 NOTE — TELEPHONE ENCOUNTER
Lexapro       Last Written Prescription Date:  9/27/2024  Last Fill Quantity: 90,   # refills: 0  Last Office Visit: 3/14/2025  Future Office visit:

## 2025-03-18 RX ORDER — ESCITALOPRAM OXALATE 20 MG/1
20 TABLET ORAL DAILY
Qty: 90 TABLET | Refills: 3 | Status: SHIPPED | OUTPATIENT
Start: 2025-03-18

## 2025-03-18 NOTE — TELEPHONE ENCOUNTER
[facility] Protocol [id number]/17/2025 02:12 PM   Protocol Details PHQ-9 score less than 5 in past 6 months              12/19/2024     6:44 PM 1/29/2025    12:07 PM 3/13/2025    10:48 AM   PHQ   PHQ-9 Total Score 5  5  6    Q9: Thoughts of better off dead/self-harm past 2 weeks Not at all Not at all Not at all       Patient-reported

## 2025-03-21 PROBLEM — K74.60 HEPATIC CIRRHOSIS, UNSPECIFIED HEPATIC CIRRHOSIS TYPE, UNSPECIFIED WHETHER ASCITES PRESENT (H): Status: ACTIVE | Noted: 2025-03-21

## 2025-03-21 PROBLEM — F33.2 SEVERE EPISODE OF RECURRENT MAJOR DEPRESSIVE DISORDER, WITHOUT PSYCHOTIC FEATURES (H): Status: ACTIVE | Noted: 2025-03-21

## 2025-06-03 DIAGNOSIS — G47.33 OBSTRUCTIVE SLEEP APNEA (ADULT) (PEDIATRIC): Primary | ICD-10-CM

## 2025-07-28 ENCOUNTER — MYC MEDICAL ADVICE (OUTPATIENT)
Dept: FAMILY MEDICINE | Facility: OTHER | Age: 70
End: 2025-07-28

## 2025-07-28 ASSESSMENT — ANXIETY QUESTIONNAIRES
7. FEELING AFRAID AS IF SOMETHING AWFUL MIGHT HAPPEN: NOT AT ALL
GAD7 TOTAL SCORE: 2
4. TROUBLE RELAXING: NOT AT ALL
3. WORRYING TOO MUCH ABOUT DIFFERENT THINGS: NOT AT ALL
2. NOT BEING ABLE TO STOP OR CONTROL WORRYING: NOT AT ALL
GAD7 TOTAL SCORE: 2
6. BECOMING EASILY ANNOYED OR IRRITABLE: SEVERAL DAYS
8. IF YOU CHECKED OFF ANY PROBLEMS, HOW DIFFICULT HAVE THESE MADE IT FOR YOU TO DO YOUR WORK, TAKE CARE OF THINGS AT HOME, OR GET ALONG WITH OTHER PEOPLE?: NOT DIFFICULT AT ALL
1. FEELING NERVOUS, ANXIOUS, OR ON EDGE: SEVERAL DAYS
7. FEELING AFRAID AS IF SOMETHING AWFUL MIGHT HAPPEN: NOT AT ALL
GAD7 TOTAL SCORE: 2
IF YOU CHECKED OFF ANY PROBLEMS ON THIS QUESTIONNAIRE, HOW DIFFICULT HAVE THESE PROBLEMS MADE IT FOR YOU TO DO YOUR WORK, TAKE CARE OF THINGS AT HOME, OR GET ALONG WITH OTHER PEOPLE: NOT DIFFICULT AT ALL
5. BEING SO RESTLESS THAT IT IS HARD TO SIT STILL: NOT AT ALL

## 2025-07-28 ASSESSMENT — PATIENT HEALTH QUESTIONNAIRE - PHQ9
SUM OF ALL RESPONSES TO PHQ QUESTIONS 1-9: 4
SUM OF ALL RESPONSES TO PHQ QUESTIONS 1-9: 4
10. IF YOU CHECKED OFF ANY PROBLEMS, HOW DIFFICULT HAVE THESE PROBLEMS MADE IT FOR YOU TO DO YOUR WORK, TAKE CARE OF THINGS AT HOME, OR GET ALONG WITH OTHER PEOPLE: NOT DIFFICULT AT ALL

## 2025-07-28 NOTE — TELEPHONE ENCOUNTER
Completed [facility] PHQ-9/MARTY-7 on 7/28/2025 for Depression Remission quality patient outreach per [facility] quality guidelines. This writer sees no major mental health and/or safety concerns at present. Currently meeting depression remission status with PHQ-9 total score <=4. Continue with the current plan of care.          1/29/2025    12:07 PM 3/13/2025    10:48 AM 7/28/2025     4:30 PM   PHQ   PHQ-9 Total Score 5  6  4    Q9: Thoughts of better off dead/self-harm past 2 weeks Not at all Not at all Not at all       Patient-reported          12/19/2024     6:44 PM 1/29/2025    12:07 PM 7/28/2025     4:30 PM   MARTY-7 SCORE   Total Score 2 (minimal anxiety) 5 (mild anxiety) 2 (minimal anxiety)   Total Score 2  5  2        Patient-reported        Appointments in Next Year      Mar 16, 2026 8:00 AM  (Arrive by 7:45 AM)  Adult Preventative Visit with Sandra Robles MD  [facility] [phone]          Devorah Krause, RN

## 2025-08-10 DIAGNOSIS — I10 ESSENTIAL HYPERTENSION: ICD-10-CM

## 2025-08-11 RX ORDER — LISINOPRIL 10 MG/1
10 TABLET ORAL DAILY
Qty: 90 TABLET | Refills: 0 | Status: SHIPPED | OUTPATIENT
Start: 2025-08-11

## (undated) DEVICE — CANISTER SUCTION MEDI-VAC GUARDIAN 2000ML 90D 65651-220

## (undated) DEVICE — FORCEP-COLON BIOPSY LARGE W/NEEDLE 240CM

## (undated) DEVICE — FORCEPS BIOPSY RADIAL JAW 4 LARGE W/NEEDLE 240CM M00513332

## (undated) DEVICE — CONNECTOR ERBEFLO 2 PORT 20325-215

## (undated) DEVICE — SOL WATER IRRIG 1000ML BOTTLE 2F7114

## (undated) DEVICE — TUBING SUCTION 20FT N620A

## (undated) DEVICE — SUCTION MANIFOLD NEPTUNE 2 SYS 1 PORT 702-025-000

## (undated) RX ORDER — PROPOFOL 10 MG/ML
INJECTION, EMULSION INTRAVENOUS
Status: DISPENSED
Start: 2024-11-24